# Patient Record
Sex: FEMALE | Race: WHITE | NOT HISPANIC OR LATINO | Employment: OTHER | URBAN - METROPOLITAN AREA
[De-identification: names, ages, dates, MRNs, and addresses within clinical notes are randomized per-mention and may not be internally consistent; named-entity substitution may affect disease eponyms.]

---

## 2017-11-20 ENCOUNTER — GENERIC CONVERSION - ENCOUNTER (OUTPATIENT)
Dept: OTHER | Facility: OTHER | Age: 72
End: 2017-11-20

## 2017-11-20 DIAGNOSIS — Z12.31 ENCOUNTER FOR SCREENING MAMMOGRAM FOR MALIGNANT NEOPLASM OF BREAST: ICD-10-CM

## 2017-11-28 ENCOUNTER — GENERIC CONVERSION - ENCOUNTER (OUTPATIENT)
Dept: OTHER | Facility: OTHER | Age: 72
End: 2017-11-28

## 2018-01-22 VITALS
BODY MASS INDEX: 30.4 KG/M2 | SYSTOLIC BLOOD PRESSURE: 140 MMHG | HEIGHT: 61 IN | DIASTOLIC BLOOD PRESSURE: 82 MMHG | WEIGHT: 161 LBS

## 2018-03-27 RX ORDER — B-COMPLEX WITH VITAMIN C
1 TABLET ORAL 2 TIMES DAILY
COMMUNITY

## 2018-03-27 RX ORDER — BIOTIN 1 MG
TABLET ORAL DAILY
COMMUNITY

## 2018-03-27 NOTE — PROGRESS NOTES
[facility]UNJ[facility]  FAMILY PRACTICE OFFICE VISIT       NAME: Anup Washington  AGE: 68 y o  SEX: female       : 1945        MRN: [id number]    DATE: 3/29/2018  TIME: 2:59 PM    Assessment and Plan     Problem List Items Addressed This Visit     Osteoporosis     Currently on Prolia injections  Has previously had Reclast and Fosamax as well  She will continue to follow with the endocrinologist as directed by them  Other Visit Diagnoses     Medicare annual wellness visit, initial    -  Primary    Need for hepatitis C screening test        Encounter for hepatitis C screening test for low risk patient        Relevant Orders    Hepatitis C antibody    Need for lipid screening        Relevant Orders    Lipid Panel with Direct LDL reflex    Screening for diabetes mellitus        Relevant Orders    Comprehensive metabolic panel    Screening for cervical cancer        Relevant Orders    Ambulatory referral to Gynecology          Osteoporosis  Currently on Prolia injections  Has previously had Reclast and Fosamax as well  She will continue to follow with the endocrinologist as directed by them  Chief Complaint     Chief Complaint   Patient presents with    Establish Care       History of Present Illness   Anup Washington is a 68y o -year-old female who presents for establishing care  The patient presents today for establishing as a new patient  She has been on Prolia for a few years for her osteoporosis  She transitioned to Prolia after doing 3 years of Reclast and then some Fosamax  She will be seeing endocrinoogist in a few weeks for yearly follow-up  She just had labs done for them and will be getting some a few weeks later also  Review of Systems   Review of Systems   Constitutional: Negative for chills and fever  Respiratory: Negative for cough, shortness of breath and wheezing  Cardiovascular: Negative for chest pain and palpitations     Gastrointestinal: Negative for blood in stool, constipation, diarrhea, nausea and vomiting  Musculoskeletal: Negative for arthralgias and myalgias  Skin: Negative for rash  Neurological: Negative for dizziness and headaches  Psychiatric/Behavioral: Negative for dysphoric mood  The patient is not nervous/anxious  Active Problem List     Patient Active Problem List   Diagnosis    Dietary calcium deficiency    Osteoporosis         Past Medical History:  Past Medical History:   Diagnosis Date    Chicken pox     Kidney stones     Malignant neoplasm of skin     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       Past Surgical History:  Past Surgical History:   Procedure Laterality Date    COLONOSCOPY  2015    COMPLETE; DUE 3 YRS    DILATION AND CURETTAGE OF UTERUS  08/2013    THICKENED ENDO ON USG, CVX STENOSIS, PATH: BENIGN POLYP FRAGMENTS AND INACTIVE ENDOMETRIUM  NO NEOPLASIA ; IMPRESSION: 10/27/14; Kasie London    HYSTEROSCOPY      TONSILLECTOMY         Family History:  Family History   Problem Relation Age of Onset    Kidney cancer Mother 79     RENAL CANCER    Hypertension Mother    Vangie Moreno Skin cancer Mother        Social History:  Social History     Social History    Marital status: Single     Spouse name: N/A    Number of children: N/A    Years of education: N/A     Occupational History    MEDICAL RECORDS      Social History Main Topics    Smoking status: Former Smoker     Packs/day: 0 50    Smokeless tobacco: Never Used      Comment: STARTED AT AGE 17   STOPPED AT AGE 26      Alcohol use No    Drug use: No    Sexual activity: Not on file     Other Topics Concern    Not on file     Social History Narrative    EXERCISES MODERATELY LESS THAN 3 TIMES WEEK           Objective     Vitals:    03/29/18 1403   BP: 142/82   Pulse: 73   SpO2: 98%     Wt Readings from Last 3 Encounters:   03/29/18 70 4 kg (155 lb 4 oz)   11/20/17 73 kg (161 lb)   11/07/16 72 1 kg (159 lb)       Physical Exam   Constitutional: She appears well-developed and well-nourished  HENT:   Head: Normocephalic and atraumatic  Right Ear: Hearing, tympanic membrane, external ear and ear canal normal    Left Ear: Hearing, tympanic membrane, external ear and ear canal normal    Nose: Nose normal    Mouth/Throat: Oropharynx is clear and moist and mucous membranes are normal    Eyes: Conjunctivae and lids are normal  Pupils are equal, round, and reactive to light  Neck: Trachea normal and normal range of motion  Neck supple  Carotid bruit is not present  No thyroid mass and no thyromegaly present  Cardiovascular: Normal rate, regular rhythm, S1 normal, S2 normal, normal heart sounds and intact distal pulses  No murmur heard  Pulses:       Radial pulses are 2+ on the right side, and 2+ on the left side  Posterior tibial pulses are 2+ on the right side, and 2+ on the left side  Pulmonary/Chest: Effort normal and breath sounds normal  She has no decreased breath sounds  She has no wheezes  She has no rhonchi  She has no rales  Abdominal: Soft  Normal appearance and bowel sounds are normal  She exhibits no distension and no mass  There is no hepatosplenomegaly  There is no tenderness  No hernia  Musculoskeletal: Normal range of motion  Lymphadenopathy:     She has no cervical adenopathy  Neurological: She is alert  She has normal strength  No sensory deficit (light touch sensation intact and equal in UE and LE bilaterally)  Skin: Skin is warm and dry  No rash noted  Psychiatric: She has a normal mood and affect   Her behavior is normal        Pertinent Laboratory/Diagnostic Studies:  Orders Placed This Encounter   Procedures    Comprehensive metabolic panel    Lipid Panel with Direct LDL reflex    Hepatitis C antibody    Ambulatory referral to Gynecology       ALLERGIES:  No Known Allergies    Current Medications     Current Outpatient Prescriptions   Medication Sig Dispense Refill    aspirin 81 MG tablet Take 81 mg by mouth daily  Calcium Carbonate-Vitamin D (CALCIUM 600+D) 600-200 MG-UNIT TABS Take by mouth daily        Cholecalciferol (VITAMIN D3) 1000 units CAPS Take by mouth daily        denosumab (PROLIA) 60 mg/mL Inject under the skin every 6 (six) months        Multiple Vitamins-Minerals (DAILY MULTI PO) Take by mouth daily         No current facility-administered medications for this visit            Health Maintenance     Health Maintenance   Topic Date Due    Hepatitis C Screening  1945    Depression Screening PHQ-9  01/30/1958    DTaP,Tdap,and Td Vaccines (1 - Tdap) 01/30/1967    Fall Risk  01/30/2011    Urinary Incontinence Screening  01/30/2011    PNEUMOCOCCAL POLYSACCHARIDE VACCINE AGE 65 AND OVER  01/30/2011    GLAUCOMA SCREENING 67+ YR  01/30/2013    INFLUENZA VACCINE  09/01/2017    MAMMOGRAM  11/28/2018    COLONOSCOPY  05/19/2020     Immunization History   Administered Date(s) Administered    Influenza 10/24/2007, 10/23/2017    Pneumococcal Conjugate 13-Valent 10/23/2017    Zoster 06/17/2016       Heath Hastings PA-C  3/29/2018 2:59 PM  [facility]

## 2018-03-29 ENCOUNTER — OFFICE VISIT (OUTPATIENT)
Dept: FAMILY MEDICINE CLINIC | Facility: CLINIC | Age: 73
End: 2018-03-29
Payer: MEDICARE

## 2018-03-29 VITALS
OXYGEN SATURATION: 98 % | SYSTOLIC BLOOD PRESSURE: 142 MMHG | HEIGHT: 61 IN | BODY MASS INDEX: 29.31 KG/M2 | DIASTOLIC BLOOD PRESSURE: 82 MMHG | HEART RATE: 73 BPM | WEIGHT: 155.25 LBS

## 2018-03-29 DIAGNOSIS — Z11.59 NEED FOR HEPATITIS C SCREENING TEST: ICD-10-CM

## 2018-03-29 DIAGNOSIS — Z13.220 NEED FOR LIPID SCREENING: ICD-10-CM

## 2018-03-29 DIAGNOSIS — Z13.1 SCREENING FOR DIABETES MELLITUS: ICD-10-CM

## 2018-03-29 DIAGNOSIS — Z00.00 MEDICARE ANNUAL WELLNESS VISIT, INITIAL: Primary | ICD-10-CM

## 2018-03-29 DIAGNOSIS — Z12.4 SCREENING FOR CERVICAL CANCER: ICD-10-CM

## 2018-03-29 DIAGNOSIS — Z11.59 ENCOUNTER FOR HEPATITIS C SCREENING TEST FOR LOW RISK PATIENT: ICD-10-CM

## 2018-03-29 DIAGNOSIS — M81.0 OSTEOPOROSIS, UNSPECIFIED OSTEOPOROSIS TYPE, UNSPECIFIED PATHOLOGICAL FRACTURE PRESENCE: ICD-10-CM

## 2018-03-29 PROCEDURE — G0438 PPPS, INITIAL VISIT: HCPCS | Performed by: PHYSICIAN ASSISTANT

## 2018-03-29 NOTE — PATIENT INSTRUCTIONS
Obesity   AMBULATORY CARE:   Obesity  is when your body mass index (BMI) is greater than 30  Your healthcare provider will use your height and weight to measure your BMI  The risks of obesity include  many health problems, such as injuries or physical disability  You may need tests to check for the following:  · Diabetes     · High blood pressure or high cholesterol     · Heart disease     · Gallbladder or liver disease     · Cancer of the colon, breast, prostate, liver, or kidney     · Sleep apnea     · Arthritis or gout  Seek care immediately if:   · You have a severe headache, confusion, or difficulty speaking  · You have weakness on one side of your body  · You have chest pain, sweating, or shortness of breath  Contact your healthcare provider if:   · You have symptoms of gallbladder or liver disease, such as pain in your upper abdomen  · You have knee or hip pain and discomfort while walking  · You have symptoms of diabetes, such as intense hunger and thirst, and frequent urination  · You have symptoms of sleep apnea, such as snoring or daytime sleepiness  · You have questions or concerns about your condition or care  Treatment for obesity  focuses on helping you lose weight to improve your health  Even a small decrease in BMI can reduce the risk for many health problems  Your healthcare provider will help you set a weight-loss goal   · Lifestyle changes  are the first step in treating obesity  These include making healthy food choices and getting regular physical activity  Your healthcare provider may suggest a weight-loss program that involves coaching, education, and therapy  · Medicine  may help you lose weight when it is used with a healthy diet and physical activity  · Surgery  can help you lose weight if you are very obese and have other health problems  There are several types of weight-loss surgery  Ask your healthcare provider for more information    Be successful losing weight:   · Set small, realistic goals  An example of a small goal is to walk for 20 minutes 5 days a week  Anther goal is to lose 5% of your body weight  · Tell friends, family members, and coworkers about your goals  and ask for their support  Ask a friend to lose weight with you, or join a weight-loss support group  · Identify foods or triggers that may cause you to overeat , and find ways to avoid them  Remove tempting high-calorie foods from your home and workplace  Place a bowl of fresh fruit on your kitchen counter  If stress causes you to eat, then find other ways to cope with stress  · Keep a diary to track what you eat and drink  Also write down how many minutes of physical activity you do each day  Weigh yourself once a week and record it in your diary  Eating changes: You will need to eat 500 to 1,000 fewer calories each day than you currently eat to lose 1 to 2 pounds a week  The following changes will help you cut calories:  · Eat smaller portions  Use small plates, no larger than 9 inches in diameter  Fill your plate half full of fruits and vegetables  Measure your food using measuring cups until you know what a serving size looks like  · Eat 3 meals and 1 or 2 snacks each day  Plan your meals in advance  Дмитрий Bender and eat at home most of the time  Eat slowly  · Eat fruits and vegetables at every meal   They are low in calories and high in fiber, which makes you feel full  Do not add butter, margarine, or cream sauce to vegetables  Use herbs to season steamed vegetables  · Eat less fat and fewer fried foods  Eat more baked or grilled chicken and fish  These protein sources are lower in calories and fat than red meat  Limit fast food  Dress your salads with olive oil and vinegar instead of bottled dressing  · Limit the amount of sugar you eat  Do not drink sugary beverages  Limit alcohol  Activity changes:  Physical activity is good for your body in many ways   It helps you burn calories and build strong muscles  It decreases stress and depression, and improves your mood  It can also help you sleep better  Talk to your healthcare provider before you begin an exercise program   · Exercise for at least 30 minutes 5 days a week  Start slowly  Set aside time each day for physical activity that you enjoy and that is convenient for you  It is best to do both weight training and an activity that increases your heart rate, such as walking, bicycling, or swimming  · Find ways to be more active  Do yard work and housecleaning  Walk up the stairs instead of using elevators  Spend your leisure time going to events that require walking, such as outdoor festivals or fairs  This extra physical activity can help you lose weight and keep it off  Follow up with your healthcare provider as directed: You may need to meet with a dietitian  Write down your questions so you remember to ask them during your visits  © 2017 [facility] Information is for End User's use only and may not be sold, redistributed or otherwise used for commercial purposes  All illustrations and images included in CareNotes® are the copyrighted property of [employer]  or Leodan Pittman  The above information is an  only  It is not intended as medical advice for individual conditions or treatments  Talk to your doctor, nurse or pharmacist before following any medical regimen to see if it is safe and effective for you  Urinary Incontinence   WHAT YOU NEED TO KNOW:   What is urinary incontinence? Urinary incontinence (UI) is when you lose control of your bladder  What causes UI? UI occurs because your bladder cannot store or empty urine properly  The following are the most common types of UI:  · Stress incontinence  is when you leak urine due to increased bladder pressure  This may happen when you cough, sneeze, or exercise       · Urge incontinence  is when you feel the need to urinate right away and leak urine accidentally  · Mixed incontinence  is when you have both stress and urge UI  What are the signs and symptoms of UI?   · You feel like your bladder does not empty completely when you urinate  · You urinate often and need to urinate immediately  · You leak urine when you sleep, or you wake up with the urge to urinate  · You leak urine when you cough, sneeze, exercise, or laugh  How is UI diagnosed? Your healthcare provider will ask how often you leak urine and whether you have stress or urge symptoms  Tell him which medicines you take, how often you urinate, and how much liquid you drink each day  You may need any of the following tests:  · Urine tests  may show infection or kidney function  · A pelvic exam  may be done to check for blockages  A pelvic exam will also show if your bladder, uterus, or other organs have moved out of place  · An x-ray, ultrasound, or CT  may show problems with parts of your urinary system  You may be given contrast liquid to help your organs show up better in the pictures  Tell the healthcare provider if you have ever had an allergic reaction to contrast liquid  Do not enter the MRI room with anything metal  Metal can cause serious injury  Tell the healthcare provider if you have any metal in or on your body  · A bladder scan  will show how much urine is left in your bladder after you urinate  You will be asked to urinate and then healthcare providers will use a small ultrasound machine to check the urine left in your bladder  · Cystometry  is used to check the function of your urinary system  Your healthcare provider checks the pressure in your bladder while filling it with fluid  Your bladder pressure may also be tested when your bladder is full and while you urinate  How is UI treated? · Medicines  can help strengthen your bladder control      · Electrical stimulation  is used to send a small amount of electrical energy to your pelvic floor muscles  This helps control your bladder function  Electrodes may be placed outside your body or in your rectum  For women, the electrodes may be placed in the vagina  · A bulking agent  may be injected into the wall of your urethra to make it thicker  This helps keep your urethra closed and decreases urine leakage  · Devices  such as a clamp, pessary, or tampon may help stop urine leaks  Ask your healthcare provider for more information about these and other devices  · Surgery  may be needed if other treatments do not work  Several types of surgery can help improve your bladder control  Ask your healthcare provider for more information about the surgery you may need  How can I manage my symptoms? · Do pelvic muscle exercises often  Your pelvic muscles help you stop urinating  Squeeze these muscles tight for 5 seconds, then relax for 5 seconds  Gradually work up to squeezing for 10 seconds  Do 3 sets of 15 repetitions a day, or as directed  This will help strengthen your pelvic muscles and improve bladder control  · A catheter  may be used to help empty your bladder  A catheter is a tiny, plastic tube that is put into your bladder to drain your urine  Your healthcare provider may tell you to use a catheter to prevent your bladder from getting too full and leaking urine  · Keep a UI record  Write down how often you leak urine and how much you leak  Make a note of what you were doing when you leaked urine  · Train your bladder  Go to the bathroom at set times, such as every 2 hours, even if you do not feel the urge to go  You can also try to hold your urine when you feel the urge to go  For example, hold your urine for 5 minutes when you feel the urge to go  As that becomes easier, hold your urine for 10 minutes  · Drink liquids as directed  Ask your healthcare provider how much liquid to drink each day and which liquids are best for you   You may need to limit the amount of liquid you drink to help control your urine leakage  Limit or do not have drinks that contain caffeine or alcohol  Do not drink any liquid right before you go to bed  · Prevent constipation  Eat a variety of high-fiber foods  Good examples are high-fiber cereals, beans, vegetables, and whole-grain breads  Prune juice may help make your bowel movement softer  Walking is the best way to trigger your intestines to have a bowel movement  · Exercise regularly and maintain a healthy weight  Ask your healthcare provider how much you should weigh and about the best exercise plan for you  Weight loss and exercise will decrease pressure on your bladder and help you control your leakage  Ask him to help you create a weight loss plan if you are overweight  When should I seek immediate care? · You have severe pain  · You are confused or cannot think clearly  When should I contact my healthcare provider? · You have a fever  · You see blood in your urine  · You have pain when you urinate  · You have new or worse pain, even after treatment  · Your mouth feels dry or you have vision changes  · Your urine is cloudy or smells bad  · You have questions or concerns about your condition or care  CARE AGREEMENT:   You have the right to help plan your care  Learn about your health condition and how it may be treated  Discuss treatment options with your caregivers to decide what care you want to receive  You always have the right to refuse treatment  The above information is an  only  It is not intended as medical advice for individual conditions or treatments  Talk to your doctor, nurse or pharmacist before following any medical regimen to see if it is safe and effective for you  © 2017 [address] Information is for End User's use only and may not be sold, redistributed or otherwise used for commercial purposes   All illustrations and images included in CareNotes® are the copyrighted property of [employer]  or Leodan Pittman  Cigarette Smoking and Your Health   AMBULATORY CARE:   Risks to your health if you smoke:  Nicotine and other chemicals found in tobacco damage every cell in your body  Even if you are a light smoker, you have an increased risk for cancer, heart disease, and lung disease  If you are pregnant or have diabetes, smoking increases your risk for complications  Benefits to your health if you stop smoking:   · You decrease respiratory symptoms such as coughing, wheezing, and shortness of breath  · You reduce your risk for cancers of the lung, mouth, throat, kidney, bladder, pancreas, stomach, and cervix  If you already have cancer, you increase the benefits of chemotherapy  You also reduce your risk for cancer returning or a second cancer from developing  · You reduce your risk for heart disease, blood clots, heart attack, and stroke  · You reduce your risk for lung infections, and diseases such as pneumonia, asthma, chronic bronchitis, and emphysema  · Your circulation improves  More oxygen can be delivered to your body  If you have diabetes, you lower your risk for complications, such as kidney, artery, and eye diseases  You also lower your risk for nerve damage  Nerve damage can lead to amputations, poor vision, and blindness  · You improve your body's ability to heal and to fight infections  Benefits to the health of others if you stop smoking:  Tobacco is harmful to nonsmokers who breathe in your secondhand smoke  The following are ways the health of others around you may improve when you stop smoking:  · You lower the risks for lung cancer and heart disease in nonsmoking adults  · If you are pregnant, you lower the risk for miscarriage, early delivery, low birth weight, and stillbirth  You also lower your baby's risk for SIDS, obesity, developmental delay, and neurobehavioral problems, such as ADHD  · If you have children, you lower their risk for ear infections, colds, pneumonia, bronchitis, and asthma  For more information and support to stop smoking:   · Smokefree  gov  Phone: 3- 657 - 902-0820  Web Address: www smokefree  gov  Follow up with your healthcare provider as directed:  Write down your questions so you remember to ask them during your visits  © 2017 [address] [employer] Information is for End User's use only and may not be sold, redistributed or otherwise used for commercial purposes  All illustrations and images included in CareNotes® are the copyrighted property of [employer]  or Leodan Pittman  The above information is an  only  It is not intended as medical advice for individual conditions or treatments  Talk to your doctor, nurse or pharmacist before following any medical regimen to see if it is safe and effective for you  Fall Prevention   AMBULATORY CARE:   Fall prevention  includes ways to make your home and other areas safer  It also includes ways you can move more carefully to prevent a fall  Health conditions that cause changes in your blood pressure, vision, or muscle strength and coordination may increase your risk for falls  Medicines may also increase your risk for falls if they make you dizzy, weak, or sleepy  Call 911 or have someone else call if:   · You have fallen and are unconscious  · You have fallen and cannot move part of your body  Contact your healthcare provider if:   · You have fallen and have pain or a headache  · You have questions or concerns about your condition or care  Fall prevention tips:   · Stand or sit up slowly  This may help you keep your balance and prevent falls  · Use assistive devices as directed  Your healthcare provider may suggest that you use a cane or walker to help you keep your balance  You may need to have grab bars put in your bathroom near the toilet or in the shower      · Wear shoes that fit well and have soles that   Wear shoes both inside and outside  Use slippers with good   Do not wear shoes with high heels  · Wear a personal alarm  This is a device that allows you to call 911 if you fall and need help  Ask your healthcare provider for more information  · Stay active  Exercise can help strengthen your muscles and improve your balance  Your healthcare provider may recommend water aerobics or walking  He or she may also recommend physical therapy to improve your coordination  Never start an exercise program without talking to your healthcare provider first      · Manage your medical conditions  Keep all appointments with your healthcare providers  Visit your eye doctor as directed  Home safety tips:   · Add items to prevent falls in the bathroom  Put nonslip strips on your bath or shower floor to prevent you from slipping  Use a bath mat if you do not have carpet in the bathroom  This will prevent you from falling when you step out of the bath or shower  Use a shower seat so you do not need to stand while you shower  Sit on the toilet or a chair in your bathroom to dry yourself and put on clothing  This will prevent you from losing your balance from drying or dressing yourself while you are standing  · Keep paths clear  Remove books, shoes, and other objects from walkways and stairs  Place cords for telephones and lamps out of the way so that you do not need to walk over them  Tape them down if you cannot move them  Remove small rugs  If you cannot remove a rug, secure it with double-sided tape  This will prevent you from tripping  · Install bright lights in your home  Use night lights to help light paths to the bathroom or kitchen  Always turn on the light before you start walking  · Keep items you use often on shelves within reach  Do not use a step stool to help you reach an item  · Paint or place reflective tape on the edges of your stairs    This will help you see the stairs better  Follow up with your healthcare provider as directed:  Write down your questions so you remember to ask them during your visits  © 2017 [address] [employer] Information is for End User's use only and may not be sold, redistributed or otherwise used for commercial purposes  All illustrations and images included in CareNotes® are the copyrighted property of [employer]  or Leodan Pittman  The above information is an  only  It is not intended as medical advice for individual conditions or treatments  Talk to your doctor, nurse or pharmacist before following any medical regimen to see if it is safe and effective for you  Advance Directives   WHAT YOU NEED TO KNOW:   What are advance directives? Advance directives are legal documents that state your wishes and plans for medical care  These plans are made ahead of time in case you lose your ability to make decisions for yourself  Advance directives can apply to any medical decision, such as the treatments you want, and if you want to donate organs  What are the types of advance directives? There are many types of advance directives, and each state has rules about how to use them  You may choose a combination of any of the following:  · Living will: This is a written record of the treatment you want  You can also choose which treatments you do not want, which to limit, and which to stop at a certain time  This includes surgery, medicine, IV fluid, and tube feedings  · Durable power of  for healthcare [facility]): This is a written record that states who you want to make healthcare choices for you when you are unable to make them for yourself  This person, called a proxy, is usually a family member or a friend  You may choose more than 1 proxy  · Do not resuscitate (DNR) order:  A DNR order is used in case your heart stops beating or you stop breathing   It is a request not to have certain forms of treatment, such as CPR  A DNR order may be included in other types of advance directives  · Medical directive: This covers the care that you want if you are in a coma, near death, or unable to make decisions for yourself  You can list the treatments you want for each condition  Treatment may include pain medicine, surgery, blood transfusions, dialysis, IV or tube feedings, and a ventilator (breathing machine)  · Values history: This document has questions about your views, beliefs, and how you feel and think about life  This information can help others choose the care that you would choose  Why are advance directives important? An advance directive helps you control your care  Although spoken wishes may be used, it is better to have your wishes written down  Spoken wishes can be misunderstood, or not followed  Treatments may be given even if you do not want them  An advance directive may make it easier for your family to make difficult choices about your care  How do I decide what to put in my advance directives? · Make informed decisions:  Make sure you fully understand treatments or care you may receive  Think about the benefits and problems your decisions could cause for you or your family  Talk to healthcare providers if you have concerns or questions before you write down your wishes  You may also want to talk with your [employer] or , or a   Check your state laws to make sure that what you put in your advance directive is legal      · Sign all forms:  Sign and date your advance directive when you have finished  You may also need 2 witnesses to sign the forms  Witnesses cannot be your doctor or his staff, your spouse, heirs or beneficiaries, people you owe money to, or your chosen proxy  Talk to your family, proxy, and healthcare providers about your advance directive  Give each person a copy, and keep one for yourself in a place you can get to easily   Do not keep it hidden or locked away  · Review and revise your plans: You can revise your advance directive at any time, as long as you are able to make decisions  Review your plan every year, and when there are changes in your life, or your health  When you make changes, let your family, proxy, and healthcare providers know  Give each a new copy  Where can I find more information? · [employer]  [address] , [place] [address]  Phone: 5- 072 - 219-0610  Phone: 2- 843 - 100-0861  Web Address: http://www  aafp org  · [address] [facility])  [address] [address] [facility], [address] [facility] [place] , [address] [place] [address]  Phone: 4- [phone] - [phone]  Phone: [phone]  Web Address: [address]  CARE AGREEMENT:   You have the right to help plan your care  To help with this plan, you must learn about your health condition and treatment options  You must also learn about advance directives and how they are used  Work with your healthcare providers to decide what care will be used to treat you  You always have the right to refuse treatment  The above information is an  only  It is not intended as medical advice for individual conditions or treatments  Talk to your doctor, nurse or pharmacist before following any medical regimen to see if it is safe and effective for you  © 2017 [address] Information is for End User's use only and may not be sold, redistributed or otherwise used for commercial purposes  All illustrations and images included in CareNotes® are the copyrighted property of [employer] M [employer]  or Leodan Pittman

## 2018-03-29 NOTE — ASSESSMENT & PLAN NOTE
Currently on Prolia injections  Has previously had Reclast and Fosamax as well  She will continue to follow with the endocrinologist as directed by them

## 2018-03-29 NOTE — PROGRESS NOTES
HPI:  Gregory Quiñones is a 68 y o  female here for her Initial Wellness Visit  Patient Active Problem List   Diagnosis    Dietary calcium deficiency    Osteoporosis     Past Medical History:   Diagnosis Date    Chicken pox     Kidney stones     Malignant neoplasm of skin     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     Past Surgical History:   Procedure Laterality Date    COLONOSCOPY  2015    COMPLETE; DUE 3 YRS    DILATION AND CURETTAGE OF UTERUS  08/2013    THICKENED ENDO ON USG, CVX STENOSIS, PATH: BENIGN POLYP FRAGMENTS AND INACTIVE ENDOMETRIUM  NO NEOPLASIA ; IMPRESSION: 10/27/14; RISA MORRIS    HYSTEROSCOPY      TONSILLECTOMY       Family History   Problem Relation Age of Onset    Kidney cancer Mother 79     RENAL CANCER    Hypertension Mother     Skin cancer Mother      History   Smoking Status    Former Smoker    Packs/day: 0 50   Smokeless Tobacco    Never Used     Comment: STARTED AT AGE 17  STOPPED AT AGE 23       History   Alcohol Use No      History   Drug Use No     /82   Pulse 73   Ht 5' 0 6" (1 539 m)   Wt 70 4 kg (155 lb 4 oz)   SpO2 98%   BMI 29 72 kg/m²       Current Outpatient Prescriptions   Medication Sig Dispense Refill    aspirin 81 MG tablet Take 81 mg by mouth daily        Calcium Carbonate-Vitamin D (CALCIUM 600+D) 600-200 MG-UNIT TABS Take by mouth daily        Cholecalciferol (VITAMIN D3) 1000 units CAPS Take by mouth daily        denosumab (PROLIA) 60 mg/mL Inject under the skin every 6 (six) months        Multiple Vitamins-Minerals (DAILY MULTI PO) Take by mouth daily         No current facility-administered medications for this visit        No Known Allergies  Immunization History   Administered Date(s) Administered    Influenza 10/24/2007, 10/23/2017    Pneumococcal Conjugate 13-Valent 10/23/2017    Zoster 06/17/2016       Patient Care Team:  Jose Eduardo Rubio MD as PCP - General (Family Medicine)  Krystyna Berg MD  Doc Gess, MD      Medicare Screening Tests and Risk Assessments:  JOHN Clinical     ISAR:       Once in a Lifetime Medicare Screening:       Medicare Screening Tests and Risk Assessment:   AAA Risk Assessment    Osteoporosis Risk Assessment    HIV Risk Assessment        Drug and Alcohol Use:   Tobacco use    Tobacco use duration    Tobacco Cessation Readiness    Alcohol use    Alcohol Treatment Readiness   Illicit Drug Use        Diet & Exercise:   Diet   How many servings a day of the following:   Exercise        Cognitive Impairment Screening:   Cognitive Impairment Screening        Functional Ability/Level of Safety:   Hearing    Hearing Impairment Assessment    Current Activities    Help needed with the folllowing:    ADL    Fall Risk   Injury History       Home Safety:   Home Safety Risk Factors       Advanced Directives:   Advanced Directives    Patient's End of Life Decisions        Urinary Incontinence:       Glaucoma:            Provider Screening     Preventative Screening/Counseling:   Cardiovascular Screening/Counseling:   (Labs Q5 years, EKG optional one-time)     Due for: Lab Panel/Analytes:  Lipid Panel         Diabetes Screening/Counseling:   (2 tests/year if Pre-Diabetes or 1 test/year if no Diabetes)     Due for: Labs:  Blood Glucose         Colorectal Cancer Screening/Counseling:   (FOBT Q1 yr; Flex Sig Q4 yrs or Q10 yrs after Screening Colonoscopy; Screening Colonoscpy Q2 yrs High Risk or Q10 yrs Low Risk; Barium Enema Q2 yrs High Risk or Q4 yrs Low Risk)   General:  Screening Current           Prostate Cancer Screening/Counseling:   (Annual)          Breast Cancer Screening/Counseling:   (Baseline Age 28 - 43; Annual Age 36+)   General:  Screening Current          Cervical Cancer Screening/Counseling:   (Annual for High Risk or Childbearing Age with Abnormal Pap in Last 3 yrs;  Every 2 all others)   General:  Screening Current           Osteoporosis Screening/Counseling:   (Every 2 Yrs if at risk or more if medically necessary)   General:  Screening Current           AAA Screening/Counseling:   (Once per Lifetime with risk factors)          Glaucoma Screening/Counseling:   (Annual)   General:  Screening Current          HIV Screening/Counseling:   (Voluntary; Once annually for high risk OR 3 times for Pregnancy at diagnosis of IUP; 3rd trimester; and at Labor         Hepatitis C Screening:   Hepatitis C Counseling Provided:  Yes Hepatitis C Screening Accepted: Yes              Immunizations:   Influenza (annual): Influenza UTD This Year   Pneumococcal (Once in a Lifetime):  Risks & Benefits Discussed   Zostavax (Medicare D Coverage, Pt >72 yo):  Zostavax Vaccine UTD   Tdap (Non-Medicare Wellness Visit required): Tdap Vaccine Needed Today       Other Preventative Couseling (Non-Medicare Wellness Visit Required):       Referrals (Non-Medicare Wellness Visit Required):       Medical Equipment/Suppliers:           No exam data present  Reviewed Updated [facility] Prior Wellness Visits:   Last Medicare wellness visit information was reviewed, patient interviewed , no change since last AWVno  Last Medicare wellness visit information was reviewed, patient interviewed and updates made to the record today no    Assessment and Plan:  1  Medicare annual wellness visit, initial     2  Need for hepatitis C screening test     3  Encounter for hepatitis C screening test for low risk patient  Hepatitis C antibody   4  Need for lipid screening  Lipid Panel with Direct LDL reflex   5  Screening for diabetes mellitus  Comprehensive metabolic panel   6  Screening for cervical cancer  Ambulatory referral to Gynecology   7   Osteoporosis, unspecified osteoporosis type, unspecified pathological fracture presence         Health Maintenance Due   Topic Date Due    Hepatitis C Screening  1945    Depression Screening PHQ-9  01/30/1958    DTaP,Tdap,and Td Vaccines (1 - Tdap) 01/30/1967    Fall Risk  01/30/2011    Urinary Incontinence Screening  01/30/2011    PNEUMOCOCCAL POLYSACCHARIDE VACCINE AGE 65 AND OVER  01/30/2011    GLAUCOMA SCREENING 67+ YR  01/30/2013    INFLUENZA VACCINE  09/01/2017

## 2018-04-16 ENCOUNTER — EVALUATION (OUTPATIENT)
Dept: PHYSICAL THERAPY | Facility: REHABILITATION | Age: 73
End: 2018-04-16
Payer: MEDICARE

## 2018-04-16 DIAGNOSIS — M54.5 LOW BACK PAIN, UNSPECIFIED BACK PAIN LATERALITY, UNSPECIFIED CHRONICITY, WITH SCIATICA PRESENCE UNSPECIFIED: Primary | ICD-10-CM

## 2018-04-16 PROCEDURE — 97140 MANUAL THERAPY 1/> REGIONS: CPT | Performed by: PHYSICAL THERAPIST

## 2018-04-16 PROCEDURE — 97161 PT EVAL LOW COMPLEX 20 MIN: CPT | Performed by: PHYSICAL THERAPIST

## 2018-04-16 PROCEDURE — G8991 OTHER PT/OT GOAL STATUS: HCPCS | Performed by: PHYSICAL THERAPIST

## 2018-04-16 PROCEDURE — G8990 OTHER PT/OT CURRENT STATUS: HCPCS | Performed by: PHYSICAL THERAPIST

## 2018-04-16 NOTE — LETTER
2018    Anne Marie Bazan, [address]  [address] [facility] [address]    Patient: Azeem Car   YOB: 1945   Date of Visit: 2018     Encounter Diagnosis     ICD-10-CM    1  Low back pain, unspecified back pain laterality, unspecified chronicity, with sciatica presence unspecified M54 5        Dear Dr Tiffanie Sharma:    Please review the attached Plan of Care from [facility] recent visit  Please verify that you agree therapy should continue by signing the attached document and sending it back to our office  If you have any questions or concerns, please don't hesitate to call  Sincerely,    Carlos Manuel Maldonado PT      Referring Provider:      I certify that I have read the below Plan of Care and certify the need for these services furnished under this plan of treatment while under my care  Anne Marie Bazan MD  [address] [facility] [address] [facility]  [address]  [address] [facility] [address]: [phone]          PT Evaluation     Today's date: 2018  Patient name: Azeem Car  : 1945  MRN: [id number]  Referring provider: Del Rivero MD  Dx:   Encounter Diagnosis     ICD-10-CM    1  Low back pain, unspecified back pain laterality, unspecified chronicity, with sciatica presence unspecified M54 5                   Assessment  Impairments: abnormal or restricted ROM and pain with function    Assessment details: Azeem Car is a 68 y o  female referred to outpt PT with dx of LBP  Pt presents with decrease in trunk ROM, decrease in LE flexibility, decrease in right hip Ir, and positive TTP  Pt funct is limited with decrease in tolerance to ambulation for exercise, pain with transfers with sit to stand, car, and tub, and positive sleep disturbances  Pt would benefit from skilled PT to address these limitations and max funct  Prognosis: good    Goals  Funct 1  No sleep disturbances due to pain x4 weeks  2  No c/o pain with transfers x4 weeks    3  Amb up to 3 miles per day for recreation x4 weeks  Impairment 1  ncrease ROM by 25% x4 weeks  2  Decrease pain by 25 % x4 weeks      Plan  Patient would benefit from: skilled PT  Planned therapy interventions: neuromuscular re-education, manual therapy, joint mobilization, stretching, therapeutic exercise and home exercise program  Frequency: 2x week  Duration in weeks: 4        Subjective Evaluation    History of Present Illness  Mechanism of injury: Pt is very active woman in which she notes beginning with severe LBP over the last week  Pt notes that she had appt with rheumatologist last week for Prolia shot in which she notes over the last few days, sx's have diminished significantly  Pt notes that she used to walk up to 3 miles per day, but was restricted last week due to severe pain  Pt has returned to amb since sx's have decreased in which she is able to perform 1 mile Friday and 2 on Saturday without any increase in LBP  Pt notes sx's are primarily in central LB and into right and c/o sx's initially when turning onto right side when sleeping, but able to rest in SL once situated  Pt is able to perform negotiation of stairs reciprocally, but cont to c/o pain  Pt notes having diff remembering what caused sx's initially last week, but notes she may have "twisted wrong" getting out of the tub  Pt c/o pain with car and tub transfers      Pain  Current pain rating: 3  At best pain rating: 3  At worst pain ratin  Quality: dull ache  Relieving factors: heat, ice and medications    Patient Goals  Patient goals for therapy: decreased pain          Objective     Neurological Testing     Sensation     Lumbar   Left   Intact: light touch    Right   Intact: light touch    Active Range of Motion     Lumbar   Flexion: 45 degrees   Extension: 10 degrees   Left lateral flexion: 20 degrees   Right lateral flexion: 20 degrees with pain  Left rotation: 45 degrees with pain  Right rotation: 45 degrees     Additional Active Range of Motion Details  Pt is mod TTP present over L4-5 TP on right  Pt has increase in pain mildly into LB with flexion and extension  Pt has increase in pain with right SB and c/o right lateral thigh "achyness" with right SB repeated  Strength/Myotome Testing     Lumbar   Left   Normal strength    Right   Normal strength    Tests       Thoracic   Negative slump  Lumbar   Negative repeated flexion, repeated extension and slump  Left   Negative crossed SLR, femoral stretch and passive SLR  Right   Negative crossed SLR, femoral stretch and passive SLR  Additional Tests Details  Pt has negative SLR  Bilat, however does demo tightness at 45 degrees, initial decrease in IR on right past neutral due to increase in LBP, and negative scour, WERNER, FADDIR          Flowsheet Rows    Flowsheet Row Most Recent Value   PT/OT G-Codes   Current Score  56   Projected Score  75   FOTO information reviewed  Yes   Assessment Type  Evaluation   G code set  Other PT/OT Primary   Other PT Primary Current Status ()  CK   Other PT Primary Goal Status ()  CJ        Precautions: Osteoporosis    Specialty Daily Treatment Diary     Manual  4/16/18       LAD/SAD R 5 mins       Grade III/IV lumbar rotation left SL 5 mins                                   Exercise Diary         treadmill        pball 3 way        Hamstring strap st        Piriformis st        MELLO at wall        Side glide to R                                                                                                                            Modalities        MHP sitting 10 mins

## 2018-04-16 NOTE — PROGRESS NOTES
PT Evaluation     Today's date: 2018  Patient name: Donny Schwarz  : 1945  MRN: [id number]  Referring provider: Heath Goins MD  Dx:   Encounter Diagnosis     ICD-10-CM    1  Low back pain, unspecified back pain laterality, unspecified chronicity, with sciatica presence unspecified M54 5                   Assessment  Impairments: abnormal or restricted ROM and pain with function    Assessment details: Donny Schwarz is a 68 y o  female referred to outpt PT with dx of LBP  Pt presents with decrease in trunk ROM, decrease in LE flexibility, decrease in right hip Ir, and positive TTP  Pt funct is limited with decrease in tolerance to ambulation for exercise, pain with transfers with sit to stand, car, and tub, and positive sleep disturbances  Pt would benefit from skilled PT to address these limitations and max funct  Prognosis: good    Goals  Funct 1  No sleep disturbances due to pain x4 weeks  2  No c/o pain with transfers x4 weeks  3  Amb up to 3 miles per day for recreation x4 weeks  Impairment 1  ncrease ROM by 25% x4 weeks  2  Decrease pain by 25 % x4 weeks      Plan  Patient would benefit from: skilled PT  Planned therapy interventions: neuromuscular re-education, manual therapy, joint mobilization, stretching, therapeutic exercise and home exercise program  Frequency: 2x week  Duration in weeks: 4        Subjective Evaluation    History of Present Illness  Mechanism of injury: Pt is very active woman in which she notes beginning with severe LBP over the last week  Pt notes that she had appt with rheumatologist last week for Prolia shot in which she notes over the last few days, sx's have diminished significantly  Pt notes that she used to walk up to 3 miles per day, but was restricted last week due to severe pain  Pt has returned to amb since sx's have decreased in which she is able to perform 1 mile Friday and 2 on Saturday without any increase in LBP    Pt notes sx's are primarily in central LB and into right and c/o sx's initially when turning onto right side when sleeping, but able to rest in SL once situated  Pt is able to perform negotiation of stairs reciprocally, but cont to c/o pain  Pt notes having diff remembering what caused sx's initially last week, but notes she may have "twisted wrong" getting out of the tub  Pt c/o pain with car and tub transfers  Pain  Current pain rating: 3  At best pain rating: 3  At worst pain ratin  Quality: dull ache  Relieving factors: heat, ice and medications    Patient Goals  Patient goals for therapy: decreased pain          Objective     Neurological Testing     Sensation     Lumbar   Left   Intact: light touch    Right   Intact: light touch    Active Range of Motion     Lumbar   Flexion: 45 degrees   Extension: 10 degrees   Left lateral flexion: 20 degrees   Right lateral flexion: 20 degrees with pain  Left rotation: 45 degrees with pain  Right rotation: 45 degrees     Additional Active Range of Motion Details  Pt is mod TTP present over L4-5 TP on right  Pt has increase in pain mildly into LB with flexion and extension  Pt has increase in pain with right SB and c/o right lateral thigh "achyness" with right SB repeated  Strength/Myotome Testing     Lumbar   Left   Normal strength    Right   Normal strength    Tests       Thoracic   Negative slump  Lumbar   Negative repeated flexion, repeated extension and slump  Left   Negative crossed SLR, femoral stretch and passive SLR  Right   Negative crossed SLR, femoral stretch and passive SLR  Additional Tests Details  Pt has negative SLR  Bilat, however does demo tightness at 45 degrees, initial decrease in IR on right past neutral due to increase in LBP, and negative scour, WERNER, FADDIR          Flowsheet Rows    Flowsheet Row Most Recent Value   PT/OT G-Codes   Current Score  56   Projected Score  75   FOTO information reviewed  Yes   Assessment Type  Evaluation   G code set  Other PT/OT Primary   Other PT Primary Current Status ()  CK   Other PT Primary Goal Status ()  CJ        Precautions: Osteoporosis    Specialty Daily Treatment Diary     Manual  4/16/18       LAD/SAD R 5 mins       Grade III/IV lumbar rotation left SL 5 mins                                   Exercise Diary         treadmill        pball 3 way        Hamstring strap st        Piriformis st        MELLO at wall        Side glide to R                                                                                                                            Modalities        MHP sitting 10 mins

## 2018-04-17 ENCOUNTER — TRANSCRIBE ORDERS (OUTPATIENT)
Dept: PHYSICAL THERAPY | Facility: REHABILITATION | Age: 73
End: 2018-04-17

## 2018-04-17 DIAGNOSIS — M54.5 LOW BACK PAIN, UNSPECIFIED BACK PAIN LATERALITY, UNSPECIFIED CHRONICITY, WITH SCIATICA PRESENCE UNSPECIFIED: Primary | ICD-10-CM

## 2018-04-19 ENCOUNTER — OFFICE VISIT (OUTPATIENT)
Dept: PHYSICAL THERAPY | Facility: REHABILITATION | Age: 73
End: 2018-04-19
Payer: MEDICARE

## 2018-04-19 DIAGNOSIS — M54.5 LOW BACK PAIN, UNSPECIFIED BACK PAIN LATERALITY, UNSPECIFIED CHRONICITY, WITH SCIATICA PRESENCE UNSPECIFIED: Primary | ICD-10-CM

## 2018-04-19 PROCEDURE — 97140 MANUAL THERAPY 1/> REGIONS: CPT

## 2018-04-19 PROCEDURE — 97110 THERAPEUTIC EXERCISES: CPT

## 2018-04-19 NOTE — PROGRESS NOTES
Daily Note     Today's date: 2018  Patient name: Zane Ordoñez  : 1945  MRN: [id number]  Referring provider: Rodger Loja MD  Dx:   Encounter Diagnosis     ICD-10-CM    1  Low back pain, unspecified back pain laterality, unspecified chronicity, with sciatica presence unspecified M54 5                   Subjective: Pt reports "I feel a little better today", notes R sided LBP rated as 3-4/10 arriving to PT this date  Objective: See treatment diary below    Precautions: Osteoporosis     Specialty Daily Treatment Diary      Manual  18         LAD/SAD R 5 mins  8 mins         Grade III/IV lumbar rotation left SL 5 mins  NP                                                         Exercise Diary              treadmill    1 5 mph 5'         pball 3 way    10"x5 ea         Hamstring strap st    15"x5 ea         Piriformis st    15"x5 ea         MELLO at wall    3"x10         Side glide to R    3"x10                                                                                                                                                                                                                   Modalities             MHP sitting 10 mins  10 mins                                          Assessment: Initiated TE as noted per PT POC and tolerated treatment well without increase in sx's  Pt has good relief with LAD/SAD, notes mild LB soreness leaving PT but denies pain  Patient would benefit from continued PT  Plan: Progress treatment as tolerated

## 2018-04-24 ENCOUNTER — OFFICE VISIT (OUTPATIENT)
Dept: PHYSICAL THERAPY | Facility: REHABILITATION | Age: 73
End: 2018-04-24
Payer: MEDICARE

## 2018-04-24 DIAGNOSIS — M54.5 LOW BACK PAIN, UNSPECIFIED BACK PAIN LATERALITY, UNSPECIFIED CHRONICITY, WITH SCIATICA PRESENCE UNSPECIFIED: Primary | ICD-10-CM

## 2018-04-24 PROCEDURE — 97110 THERAPEUTIC EXERCISES: CPT

## 2018-04-24 PROCEDURE — 97140 MANUAL THERAPY 1/> REGIONS: CPT

## 2018-04-24 NOTE — PROGRESS NOTES
Daily Note     Today's date: 2018  Patient name: Elizabeth Carlisle  : 1945  MRN: [id number]  Referring provider: Peter Liu MD  Dx:   Encounter Diagnosis     ICD-10-CM    1  Low back pain, unspecified back pain laterality, unspecified chronicity, with sciatica presence unspecified M54 5                   Subjective: Pt reports feeling "good" after last treatment, however notes having more pain on  rated at worst 8/10, which she is unsure why but notes remaining active  Pt notes being painfree yesterday and was able to complete 3 mile walk without onset of sx's for the first time in [facility]  Pt presents to PT this date with min 2/10 sx's  Objective: See treatment diary below    Precautions: Osteoporosis     Specialty Daily Treatment Diary      Manual  18       LAD/SAD R 5 mins  8 mins  5 mins       Grade III/IV lumbar rotation left SL 5 mins  NP  5 mins                                                       Exercise Diary              treadmill    1 5 mph 5'  1 5 mph 5'       pball 3 way    10"x5 ea  10"x5 ea       Hamstring strap st    15"x5 ea  15"x5 ea       Piriformis st    15"x5 ea  15"x5 ea       MELLO at wall    3"x10  3"x15       Side glide to R    3"x10  3"x10                                                                                                                                                                                                                 Modalities             MHP sitting 10 mins  10 mins  10 mins                                        Assessment:  Pt tolerated treatment well without increase in sx's  Has greater flexibility restrictions R > L  Pt has good response to mobs performed by Andrea Hoyt DPT, f/b heat and denies all sx's leaving Pt  Patient would benefit from continued PT  Plan: Progress treatment as tolerated

## 2018-04-25 LAB
ALBUMIN SERPL-MCNC: 3.3 G/DL (ref 3.6–5.1)
ALBUMIN/GLOB SERPL: 1 (CALC) (ref 1–2.5)
ALP SERPL-CCNC: 88 U/L (ref 33–130)
ALT SERPL-CCNC: 20 U/L (ref 6–29)
AST SERPL-CCNC: 40 U/L (ref 10–35)
BILIRUB SERPL-MCNC: 1.4 MG/DL (ref 0.2–1.2)
BUN SERPL-MCNC: 13 MG/DL (ref 7–25)
BUN/CREAT SERPL: ABNORMAL (CALC) (ref 6–22)
CALCIUM SERPL-MCNC: 9.3 MG/DL (ref 8.6–10.4)
CHLORIDE SERPL-SCNC: 106 MMOL/L (ref 98–110)
CHOLEST SERPL-MCNC: 175 MG/DL
CHOLEST/HDLC SERPL: 2.9 (CALC)
CO2 SERPL-SCNC: 26 MMOL/L (ref 20–31)
CREAT SERPL-MCNC: 0.8 MG/DL (ref 0.6–0.93)
GLOBULIN SER CALC-MCNC: 3.4 G/DL (CALC) (ref 1.9–3.7)
GLUCOSE SERPL-MCNC: 92 MG/DL (ref 65–99)
HCV AB S/CO SERPL IA: 0.01
HCV AB SERPL QL IA: NORMAL
HDLC SERPL-MCNC: 61 MG/DL
LDLC SERPL CALC-MCNC: 98 MG/DL (CALC)
NONHDLC SERPL-MCNC: 114 MG/DL (CALC)
POTASSIUM SERPL-SCNC: 3.7 MMOL/L (ref 3.5–5.3)
PROT SERPL-MCNC: 6.7 G/DL (ref 6.1–8.1)
SL AMB EGFR AFRICAN AMERICAN: 85 ML/MIN/1.73M2
SL AMB EGFR NON AFRICAN AMERICAN: 74 ML/MIN/1.73M2
SODIUM SERPL-SCNC: 141 MMOL/L (ref 135–146)
TRIGL SERPL-MCNC: 72 MG/DL

## 2018-04-26 ENCOUNTER — OFFICE VISIT (OUTPATIENT)
Dept: PHYSICAL THERAPY | Facility: REHABILITATION | Age: 73
End: 2018-04-26
Payer: MEDICARE

## 2018-04-26 DIAGNOSIS — M54.5 LOW BACK PAIN, UNSPECIFIED BACK PAIN LATERALITY, UNSPECIFIED CHRONICITY, WITH SCIATICA PRESENCE UNSPECIFIED: Primary | ICD-10-CM

## 2018-04-26 PROCEDURE — 97110 THERAPEUTIC EXERCISES: CPT | Performed by: PHYSICAL THERAPIST

## 2018-04-26 PROCEDURE — 97140 MANUAL THERAPY 1/> REGIONS: CPT | Performed by: PHYSICAL THERAPIST

## 2018-04-26 NOTE — PROGRESS NOTES
Daily Note     Today's date: 2018  Patient name: Berenice Tejeda  : 1945  MRN: [id number]  Referring provider: Luca Anthony MD  Dx:   Encounter Diagnosis     ICD-10-CM    1  Low back pain, unspecified back pain laterality, unspecified chronicity, with sciatica presence unspecified M54 5                   Subjective: Pt notes 3-4/10 pain, "much better compared to when I started "        Objective: See treatment diary below    Precautions: Osteoporosis     Specialty Daily Treatment Diary      Manual  18     LAD/SAD R 5 mins  8 mins  5 mins  5 mins     Grade III/IV lumbar rotation left SL 5 mins  NP  5 mins  5 mins                                                     Exercise Diary              treadmill    1 5 mph 5'  1 5 mph 5'  1 5 mph x6 mins     pball 3 way    10"x5 ea  10"x5 ea  10"x5 ea     Hamstring strap st    15"x5 ea  15"x5 ea  20"x5 ea     Piriformis st    15"x5 ea  15"x5 ea  20"x5 ea     MELLO at wall    3"x10  3"x15  3"x20     Side glide to R    3"x10  3"x10  3"x15                                                                                                                                                                                                               Modalities             MHP sitting 10 mins  10 mins  10 mins  10 mins                                      Assessment:  Pt notes during treatment that she amb 3 miles Tuesday without an increase in pain  Pt had spasms on 18 with insidious onset, but notes no spasms since  Good progression of stretching activities and educated to cont to progress ambulation as able  Plan: Progress as tolerated

## 2018-04-27 ENCOUNTER — TELEPHONE (OUTPATIENT)
Dept: FAMILY MEDICINE CLINIC | Facility: CLINIC | Age: 73
End: 2018-04-27

## 2018-04-27 DIAGNOSIS — R74.8 ELEVATED LIVER ENZYMES: Primary | ICD-10-CM

## 2018-04-30 ENCOUNTER — OFFICE VISIT (OUTPATIENT)
Dept: PHYSICAL THERAPY | Facility: REHABILITATION | Age: 73
End: 2018-04-30
Payer: MEDICARE

## 2018-04-30 DIAGNOSIS — M54.5 LOW BACK PAIN, UNSPECIFIED BACK PAIN LATERALITY, UNSPECIFIED CHRONICITY, WITH SCIATICA PRESENCE UNSPECIFIED: Primary | ICD-10-CM

## 2018-04-30 PROCEDURE — 97110 THERAPEUTIC EXERCISES: CPT | Performed by: PHYSICAL THERAPIST

## 2018-04-30 PROCEDURE — 97140 MANUAL THERAPY 1/> REGIONS: CPT | Performed by: PHYSICAL THERAPIST

## 2018-04-30 NOTE — PROGRESS NOTES
Daily Note     Today's date: 2018  Patient name: Kook Mueller  : 1945  MRN: [id number]  Referring provider: Sammy Lucero MD  Dx:   Encounter Diagnosis     ICD-10-CM    1  Low back pain, unspecified back pain laterality, unspecified chronicity, with sciatica presence unspecified M54 5                   Subjective: Pt notes that she is having less pain overall  Cont to have mild sx's with car transfers, but "tolerable "        Objective: See treatment diary below    Precautions: Osteoporosis     Specialty Daily Treatment Diary      Manual  18   LAD/SAD R 5 mins  8 mins  5 mins  5 mins  5 mins   Grade III/IV lumbar rotation left SL 5 mins  NP  5 mins  5 mins  5 mins                                                   Exercise Diary              treadmill    1 5 mph 5'  1 5 mph 5'  1 5 mph x6 mins  1 8 mph x8 mins   pball 3 way    10"x5 ea  10"x5 ea  10"x5 ea  10"x5   Hamstring strap st    15"x5 ea  15"x5 ea  20"x5 ea  20"x5   Piriformis st    15"x5 ea  15"x5 ea  20"x5 ea  20"x5   MELLO at wall    3"x10  3"x15  3"x20  5"x20   Side glide to R    3"x10  3"x10  3"x15  5"x15    bridges              supine TrA                                                                                                                                                                                           Modalities             MHP sitting 10 mins  10 mins  10 mins  10 mins  10 mins                                    Assessment:  Pt denies any pain during all exercises  Pt notes having no "sharp" sx's for the last week and has returned to 3 miles of amb per day  Pt does note that she has mild sx's with car transfers and educated to perform with sitting before pulling feet into car  Plan: Progress as able

## 2018-05-03 ENCOUNTER — OFFICE VISIT (OUTPATIENT)
Dept: PHYSICAL THERAPY | Facility: REHABILITATION | Age: 73
End: 2018-05-03
Payer: MEDICARE

## 2018-05-03 DIAGNOSIS — M54.5 LOW BACK PAIN, UNSPECIFIED BACK PAIN LATERALITY, UNSPECIFIED CHRONICITY, WITH SCIATICA PRESENCE UNSPECIFIED: Primary | ICD-10-CM

## 2018-05-03 PROCEDURE — 97110 THERAPEUTIC EXERCISES: CPT

## 2018-05-03 PROCEDURE — 97140 MANUAL THERAPY 1/> REGIONS: CPT

## 2018-05-03 PROCEDURE — 97112 NEUROMUSCULAR REEDUCATION: CPT

## 2018-05-03 NOTE — PROGRESS NOTES
Daily Note     Today's date: 5/3/2018  Patient name: Santiago Lopez  : 1945  MRN: [id number]  Referring provider: Sy Bryant MD  Dx:   Encounter Diagnosis     ICD-10-CM    1  Low back pain, unspecified back pain laterality, unspecified chronicity, with sciatica presence unspecified M54 5                   Subjective: Pt notes improvement in getting in/out of car after education last visit, denies any pain arriving to PT this date  Objective: See treatment diary below    Precautions: Osteoporosis     Specialty Daily Treatment Diary      Manual  5/3/18  4/19/18  4/24/18  4/26/18  4/30/18   LAD/SAD R 8 mins  8 mins  5 mins  5 mins  5 mins   Grade III/IV lumbar rotation left SL NP  NP  5 mins  5 mins  5 mins                                                   Exercise Diary              treadmill  1 8 mph x10 mins  1 5 mph 5'  1 5 mph 5'  1 5 mph x6 mins  1 8 mph x8 mins   pball 3 way  10"x5 ea  10"x5 ea  10"x5 ea  10"x5 ea  10"x5   Hamstring strap st  20"x5  15"x5 ea  15"x5 ea  20"x5 ea  20"x5   Piriformis st  20"x5  15"x5 ea  15"x5 ea  20"x5 ea  20"x5   MELLO at wall  5"x20  3"x10  3"x15  3"x20  5"x20   Side glide to R  5"x15  3"x10  3"x10  3"x15  5"x15    bridges              supine TrA                                                                                                                                                                                           Modalities             MHP sitting 10 mins  10 mins  10 mins  10 mins  10 mins                                    Assessment:  Pt demo's good knowledge of exercise program with min cues for form and no increase in sx's with TE  Pt has good response to SAD/LAD, mobs NP secondary to being painfree  Plan: Progress as able  Pt was supervised 1:1 by Kashmir Lemus DPT from 2:34 - 2:43 pm and was 1:1 with treating clinician for rest of session

## 2018-05-07 ENCOUNTER — OFFICE VISIT (OUTPATIENT)
Dept: PHYSICAL THERAPY | Facility: REHABILITATION | Age: 73
End: 2018-05-07
Payer: MEDICARE

## 2018-05-07 DIAGNOSIS — M54.5 LOW BACK PAIN, UNSPECIFIED BACK PAIN LATERALITY, UNSPECIFIED CHRONICITY, WITH SCIATICA PRESENCE UNSPECIFIED: Primary | ICD-10-CM

## 2018-05-07 PROCEDURE — 97112 NEUROMUSCULAR REEDUCATION: CPT | Performed by: PHYSICAL THERAPIST

## 2018-05-07 PROCEDURE — 97110 THERAPEUTIC EXERCISES: CPT | Performed by: PHYSICAL THERAPIST

## 2018-05-07 NOTE — PROGRESS NOTES
Daily Note     Today's date: 2018  Patient name: Indio Brewer  : 1945  MRN: [id number]  Referring provider: Lakshmi Sweeney MD  Dx:   Encounter Diagnosis     ICD-10-CM    1  Low back pain, unspecified back pain laterality, unspecified chronicity, with sciatica presence unspecified M54 5                   Subjective: Pt notes twinges from time to time but significant decrease in pain since beginning PT  Objective: See treatment diary below    Precautions: Osteoporosis     Specialty Daily Treatment Diary      Manual  5/3/18 5/7/18  4/24/18  4/26/18  4/30/18   LAD/SAD R 8 mins  8 mins  5 mins  5 mins  5 mins   Grade III/IV lumbar rotation left SL  D/c D/c                                                     Exercise Diary              treadmill  1 8 mph x10 mins  2 3 mph 7'  1 5 mph 5'  1 5 mph x6 mins  1 8 mph x8 mins   pball 3 way  10"x5 ea 10"x5 ea  10"x5 ea  10"x5 ea  10"x5   Hamstring strap st  hep 20"x5  15"x5 ea  20"x5 ea  20"x5   Piriformis st  hep 20"x5  15"x5 ea  20"x5 ea  20"x5   MELLO at wall  5"x20 5"x20  3"x15  3"x20  5"x20   Side glide to R  5"x20 5"x15  3"x10  3"x15  5"x15    bridges   3"x10          supine TrA   5"x10                                                                                                                                                                                       Modalities             MHP sitting 10 mins  10 mins  10 mins  10 mins  10 mins                                    Assessment:  Pt demo's diff with initiating TA and bridges, however when cued appropriately pt had no sx's and was able to perform with larger ROM (bridges)  Educated to cont to focus on stretching as part of HEP  Plan: RE next visit with probable DC at that time

## 2018-05-10 ENCOUNTER — EVALUATION (OUTPATIENT)
Dept: PHYSICAL THERAPY | Facility: REHABILITATION | Age: 73
End: 2018-05-10
Payer: MEDICARE

## 2018-05-10 DIAGNOSIS — M54.5 LOW BACK PAIN, UNSPECIFIED BACK PAIN LATERALITY, UNSPECIFIED CHRONICITY, WITH SCIATICA PRESENCE UNSPECIFIED: Primary | ICD-10-CM

## 2018-05-10 PROCEDURE — G8992 OTHER PT/OT  D/C STATUS: HCPCS | Performed by: PHYSICAL THERAPIST

## 2018-05-10 PROCEDURE — 97112 NEUROMUSCULAR REEDUCATION: CPT | Performed by: PHYSICAL THERAPIST

## 2018-05-10 PROCEDURE — G8991 OTHER PT/OT GOAL STATUS: HCPCS | Performed by: PHYSICAL THERAPIST

## 2018-05-10 PROCEDURE — 97140 MANUAL THERAPY 1/> REGIONS: CPT | Performed by: PHYSICAL THERAPIST

## 2018-05-10 NOTE — PROGRESS NOTES
PT Discharge    Today's date: 5/10/2018  Patient name: Aura Holland  : 1945  MRN: [id number]  Referring provider: Michelle Lugo MD  Dx:   Encounter Diagnosis     ICD-10-CM    1  Low back pain, unspecified back pain laterality, unspecified chronicity, with sciatica presence unspecified M54 5                   Assessment    Assessment details: Pt has met most funct and impairment goals with PT  Pt is DC'd from PT to cont with HEP to focus on TA contraction during transfers to assist with pain  Goals  Funct 1  No sleep disturbances due to pain x4 weeks-met  2  No c/o pain with transfers x4 weeks-partially met  3  Amb up to 3 miles per day for recreation x4 weeks-met  Impairment 1  ncrease ROM by 25% x4 weeks-met  2  Decrease pain by 25 % x4 weeks-met      Plan  Plan details: Pt DC'd from PT  Subjective Evaluation    History of Present Illness  Mechanism of injury: Pt notes returning to previously level of funct with up to 95% improvement with PT  Pt notes that she has returned to her 3 mile per day walk with good tolerance, no pain with negotiation of stairs, and less pain with transfers  Pt does cont to have some discomfort with tub and bed transfers and notes improvement with using TA  Pain  Current pain ratin  At best pain ratin  At worst pain ratin          Objective     Neurological Testing     Sensation     Lumbar   Left   Intact: light touch    Right   Intact: light touch    Active Range of Motion     Lumbar   Flexion: 60 degrees   Extension: 15 degrees   Left lateral flexion: 20 degrees   Right lateral flexion: 20 degrees   Left rotation: 60 degrees   Right rotation: 60 degrees     Additional Active Range of Motion Details  Pt has mild TTP present L4-5 on right  Pt denies pain with ROM as above  Strength/Myotome Testing     Lumbar   Left   Normal strength    Right   Normal strength    Tests       Thoracic   Negative slump       Lumbar   Negative repeated flexion, repeated extension and slump  Left   Negative crossed SLR, femoral stretch and passive SLR  Right   Negative crossed SLR, femoral stretch and passive SLR         Flowsheet Rows      Most Recent Value   PT/OT G-Codes   Current Score  72   Projected Score  72   FOTO information reviewed  Yes   Assessment Type  Discharge   G code set  Other PT/OT Primary   Other PT Primary Goal Status ()  CJ   Other PT Primary Discharge Status ()  CJ        Precautions: Osteoporosis     Specialty Daily Treatment Diary      Manual  5/3/18 5/7/18  5/10/18  4/26/18  4/30/18   LAD/SAD R 8 mins  8 mins   5 mins  5 mins   Grade III/IV lumbar rotation left SL   D/c         RE     10 mins                                         Exercise Diary              treadmill  1 8 mph x10 mins  2 3 mph 7'  2 3 mph 10 mins  1 5 mph x6 mins  1 8 mph x8 mins   pball 3 way  10"x5 ea 10"x5 ea  10"x5 ea  10"x5 ea  10"x5   Hamstring strap st  hep 20"x5  15"x5 ea  20"x5 ea  20"x5   Piriformis st  hep 20"x5  15"x5 ea  20"x5 ea  20"x5   MELLO at wall  5"x20 5"x20  3"x15  3"x20  5"x20   Side glide to R  5"x20 5"x15  3"x10  3"x15  5"x15    bridges   3"x10  hep        supine TrA   5"x10  hep                                                                                                                                                                                     Modalities             MHP sitting 10 mins  10 mins  deferred  10 mins  10 mins

## 2018-06-01 LAB
ALBUMIN SERPL-MCNC: 3.2 G/DL (ref 3.6–5.1)
ALBUMIN/GLOB SERPL: 0.9 (CALC) (ref 1–2.5)
ALP SERPL-CCNC: 85 U/L (ref 33–130)
ALT SERPL-CCNC: 17 U/L (ref 6–29)
AST SERPL-CCNC: 38 U/L (ref 10–35)
BILIRUB DIRECT SERPL-MCNC: 0.3 MG/DL
BILIRUB INDIRECT SERPL-MCNC: 1.2 MG/DL (CALC) (ref 0.2–1.2)
BILIRUB SERPL-MCNC: 1.5 MG/DL (ref 0.2–1.2)
GLOBULIN SER CALC-MCNC: 3.5 G/DL (CALC) (ref 1.9–3.7)
PROT SERPL-MCNC: 6.7 G/DL (ref 6.1–8.1)

## 2019-04-27 PROBLEM — Z86.0100 HX OF COLONIC POLYPS: Status: ACTIVE | Noted: 2018-06-12

## 2019-04-27 PROBLEM — Z86.010 HX OF COLONIC POLYPS: Status: ACTIVE | Noted: 2018-06-12

## 2019-04-30 ENCOUNTER — OFFICE VISIT (OUTPATIENT)
Dept: FAMILY MEDICINE CLINIC | Facility: CLINIC | Age: 74
End: 2019-04-30
Payer: MEDICARE

## 2019-04-30 VITALS
WEIGHT: 155.38 LBS | OXYGEN SATURATION: 99 % | HEART RATE: 82 BPM | TEMPERATURE: 97.8 F | DIASTOLIC BLOOD PRESSURE: 90 MMHG | BODY MASS INDEX: 30.51 KG/M2 | HEIGHT: 60 IN | SYSTOLIC BLOOD PRESSURE: 148 MMHG

## 2019-04-30 DIAGNOSIS — Z13.1 SCREENING FOR DIABETES MELLITUS: ICD-10-CM

## 2019-04-30 DIAGNOSIS — Z23 ENCOUNTER FOR IMMUNIZATION: ICD-10-CM

## 2019-04-30 DIAGNOSIS — S01.80XA OPEN WOUND OF FACE, INITIAL ENCOUNTER: ICD-10-CM
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
## 2019-04-30 DIAGNOSIS — R01.1 HEART MURMUR: ICD-10-CM

## 2019-04-30 DIAGNOSIS — Z13.220 LIPID SCREENING: ICD-10-CM

## 2019-04-30 DIAGNOSIS — Z00.00 MEDICARE ANNUAL WELLNESS VISIT, SUBSEQUENT: Primary | ICD-10-CM

## 2019-04-30 DIAGNOSIS — E66.9 OBESITY (BMI 30.0-34.9): ICD-10-CM

## 2019-04-30 DIAGNOSIS — Z13.6 SCREENING FOR CARDIOVASCULAR CONDITION: ICD-10-CM

## 2019-04-30 DIAGNOSIS — M81.0 OSTEOPOROSIS, UNSPECIFIED OSTEOPOROSIS TYPE, UNSPECIFIED PATHOLOGICAL FRACTURE PRESENCE: ICD-10-CM

## 2019-04-30 PROCEDURE — G0439 PPPS, SUBSEQ VISIT: HCPCS | Performed by: PHYSICIAN ASSISTANT

## 2019-04-30 PROCEDURE — 99213 OFFICE O/P EST LOW 20 MIN: CPT | Performed by: PHYSICIAN ASSISTANT

## 2019-04-30 PROCEDURE — 90715 TDAP VACCINE 7 YRS/> IM: CPT

## 2019-04-30 PROCEDURE — 90471 IMMUNIZATION ADMIN: CPT
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
## 2019-06-03 ENCOUNTER — APPOINTMENT (OUTPATIENT)
Dept: RADIOLOGY | Facility: MEDICAL CENTER | Age: 74
End: 2019-06-03
Payer: MEDICARE

## 2019-06-03 ENCOUNTER — OFFICE VISIT (OUTPATIENT)
Dept: FAMILY MEDICINE CLINIC | Facility: CLINIC | Age: 74
End: 2019-06-03
Payer: MEDICARE

## 2019-06-03 VITALS
HEIGHT: 60 IN | WEIGHT: 159.2 LBS | TEMPERATURE: 97.3 F | BODY MASS INDEX: 31.25 KG/M2 | HEART RATE: 75 BPM | DIASTOLIC BLOOD PRESSURE: 80 MMHG | OXYGEN SATURATION: 97 % | SYSTOLIC BLOOD PRESSURE: 150 MMHG

## 2019-06-03 DIAGNOSIS — M79.641 PAIN OF RIGHT HAND: ICD-10-CM
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
## 2019-06-03 PROCEDURE — 99213 OFFICE O/P EST LOW 20 MIN: CPT | Performed by: INTERNAL MEDICINE

## 2019-06-03 PROCEDURE — 73130 X-RAY EXAM OF HAND: CPT

## 2019-06-06 LAB
ALBUMIN SERPL-MCNC: 3.2 G/DL (ref 3.6–5.1)
ALBUMIN/GLOB SERPL: 0.9 (CALC) (ref 1–2.5)
ALP SERPL-CCNC: 79 U/L (ref 33–130)
ALT SERPL-CCNC: 18 U/L (ref 6–29)
AST SERPL-CCNC: 36 U/L (ref 10–35)
BILIRUB SERPL-MCNC: 1.7 MG/DL (ref 0.2–1.2)
BUN SERPL-MCNC: 10 MG/DL (ref 7–25)
BUN/CREAT SERPL: ABNORMAL (CALC) (ref 6–22)
CALCIUM SERPL-MCNC: 9.2 MG/DL (ref 8.6–10.4)
CHLORIDE SERPL-SCNC: 105 MMOL/L (ref 98–110)
CHOLEST SERPL-MCNC: 179 MG/DL
CHOLEST/HDLC SERPL: 2.9 (CALC)
CO2 SERPL-SCNC: 26 MMOL/L (ref 20–32)
CREAT SERPL-MCNC: 0.81 MG/DL (ref 0.6–0.93)
GLOBULIN SER CALC-MCNC: 3.5 G/DL (CALC) (ref 1.9–3.7)
GLUCOSE SERPL-MCNC: 86 MG/DL (ref 65–99)
HDLC SERPL-MCNC: 62 MG/DL
LDLC SERPL CALC-MCNC: 101 MG/DL (CALC)
NONHDLC SERPL-MCNC: 117 MG/DL (CALC)
POTASSIUM SERPL-SCNC: 3.6 MMOL/L (ref 3.5–5.3)
PROT SERPL-MCNC: 6.7 G/DL (ref 6.1–8.1)
SL AMB EGFR AFRICAN AMERICAN: 84 ML/MIN/1.73M2
SL AMB EGFR NON AFRICAN AMERICAN: 72 ML/MIN/1.73M2
SODIUM SERPL-SCNC: 139 MMOL/L (ref 135–146)
TRIGL SERPL-MCNC: 70 MG/DL

## 2019-06-13 ENCOUNTER — HOSPITAL ENCOUNTER (OUTPATIENT)
Dept: NON INVASIVE DIAGNOSTICS | Facility: CLINIC | Age: 74
Discharge: HOME | End: 2019-06-13
Payer: MEDICARE

## 2019-06-13 DIAGNOSIS — R01.1 HEART MURMUR: ICD-10-CM

## 2019-06-13 PROCEDURE — 93306 TTE W/DOPPLER COMPLETE: CPT | Performed by: INTERNAL MEDICINE

## 2019-06-13 PROCEDURE — 93306 TTE W/DOPPLER COMPLETE: CPT

## 2019-06-17 DIAGNOSIS — R01.1 HEART MURMUR: Primary | ICD-10-CM

## 2019-06-17 DIAGNOSIS — R93.1 ABNORMAL ECHOCARDIOGRAM: ICD-10-CM

## 2019-06-20 ENCOUNTER — CONSULT (OUTPATIENT)
Dept: CARDIOLOGY CLINIC | Facility: CLINIC | Age: 74
End: 2019-06-20
Payer: MEDICARE

## 2019-06-20 VITALS
HEIGHT: 61 IN | SYSTOLIC BLOOD PRESSURE: 138 MMHG | WEIGHT: 147 LBS | HEART RATE: 72 BPM | DIASTOLIC BLOOD PRESSURE: 90 MMHG | BODY MASS INDEX: 27.75 KG/M2

## 2019-06-20 DIAGNOSIS — R01.1 HEART MURMUR: Primary | ICD-10-CM

## 2019-06-20 DIAGNOSIS — R93.1 ABNORMAL ECHOCARDIOGRAM: ICD-10-CM

## 2019-06-20 PROCEDURE — 93000 ELECTROCARDIOGRAM COMPLETE: CPT | Performed by: INTERNAL MEDICINE

## 2019-06-20 PROCEDURE — 99214 OFFICE O/P EST MOD 30 MIN: CPT | Performed by: INTERNAL MEDICINE

## 2019-06-20 RX ORDER — LOSARTAN POTASSIUM AND HYDROCHLOROTHIAZIDE 12.5; 5 MG/1; MG/1
1 TABLET ORAL DAILY
Qty: 30 TABLET | Refills: 5 | Status: SHIPPED | OUTPATIENT
Start: 2019-06-20 | End: 2019-12-12 | Stop reason: SDUPTHER

## 2019-06-28 ENCOUNTER — TELEPHONE (OUTPATIENT)
Dept: CARDIOLOGY CLINIC | Facility: CLINIC | Age: 74
End: 2019-06-28

## 2019-07-08 LAB
BUN SERPL-MCNC: 15 MG/DL (ref 7–25)
BUN/CREAT SERPL: ABNORMAL (CALC) (ref 6–22)
CALCIUM SERPL-MCNC: 9.7 MG/DL (ref 8.6–10.4)
CHLORIDE SERPL-SCNC: 104 MMOL/L (ref 98–110)
CO2 SERPL-SCNC: 27 MMOL/L (ref 20–32)
CREAT SERPL-MCNC: 0.79 MG/DL (ref 0.6–0.93)
GLUCOSE SERPL-MCNC: 95 MG/DL (ref 65–99)
POTASSIUM SERPL-SCNC: 3.3 MMOL/L (ref 3.5–5.3)
SL AMB EGFR AFRICAN AMERICAN: 86 ML/MIN/1.73M2
SL AMB EGFR NON AFRICAN AMERICAN: 74 ML/MIN/1.73M2
SODIUM SERPL-SCNC: 139 MMOL/L (ref 135–146)

## 2019-07-11 DIAGNOSIS — I10 ESSENTIAL HYPERTENSION: Primary | ICD-10-CM

## 2019-07-11 RX ORDER — POTASSIUM CHLORIDE 750 MG/1
10 TABLET, EXTENDED RELEASE ORAL DAILY
Qty: 30 TABLET | Refills: 5 | Status: SHIPPED | OUTPATIENT
Start: 2019-07-11 | End: 2019-12-12 | Stop reason: SDUPTHER

## 2019-09-05 ENCOUNTER — OFFICE VISIT (OUTPATIENT)
Dept: CARDIOLOGY CLINIC | Facility: CLINIC | Age: 74
End: 2019-09-05
Payer: MEDICARE

## 2019-09-05 VITALS
BODY MASS INDEX: 27.79 KG/M2 | DIASTOLIC BLOOD PRESSURE: 64 MMHG | WEIGHT: 147.2 LBS | RESPIRATION RATE: 16 BRPM | SYSTOLIC BLOOD PRESSURE: 118 MMHG | HEART RATE: 72 BPM | HEIGHT: 61 IN

## 2019-09-05 DIAGNOSIS — R01.1 HEART MURMUR: Primary | ICD-10-CM

## 2019-09-05 PROCEDURE — 99213 OFFICE O/P EST LOW 20 MIN: CPT | Performed by: INTERNAL MEDICINE

## 2019-09-05 NOTE — PROGRESS NOTES
[address] [facility] [place]  [address] [facility]  [phone]    Cardiology Follow up    Patient:  Ruben Zarate  :  1945  MRN:  [id number]    History of Present Illness:      16-year-old female with past medical history of osteoporosis presents     Patient Active Problem List   Diagnosis    Dietary calcium deficiency    Obesity (BMI 30 0-34  9)    Osteoporosis    Hx of colonic polyps    Heart murmur    Fall       Past Surgical History  Past Surgical History:   Procedure Laterality Date    COLONOSCOPY      COMPLETE; DUE 3 YRS    DILATION AND CURETTAGE OF UTERUS  2013    THICKENED ENDO ON USG, CVX STENOSIS, PATH: BENIGN POLYP FRAGMENTS AND INACTIVE ENDOMETRIUM  NO NEOPLASIA ; IMPRESSION: 10/27/14; RISA MORRIS    HYSTEROSCOPY      TONSILLECTOMY         Social History   Social History     Socioeconomic History    Marital status: Single     Spouse name: Not on file    Number of children: Not on file    Years of education: Not on file    Highest education level: Not on file   Occupational History    Occupation: [employer]   Social Needs    Financial resource strain: Not on file    Food insecurity:     Worry: Not on file     Inability: Not on file    Transportation needs:     Medical: Not on file     Non-medical: Not on file   Tobacco Use    Smoking status: Former Smoker     Packs/day: 0 50     Years: 5 00     Pack years: 2 50    Smokeless tobacco: Never Used    Tobacco comment: STARTED AT AGE 18   STOPPED AT AGE 23     Substance and Sexual Activity    Alcohol use: No    Drug use: No    Sexual activity: Not on file   Lifestyle    Physical activity:     Days per week: Not on file     Minutes per session: Not on file    Stress: Not on file   Relationships    Social connections:     Talks on phone: Not on file     Gets together: Not on file     Attends [employer] service: Not on file     Active member of club or organization: Not on file Attends meetings of clubs or organizations: Not on file     Relationship status: Not on file    Intimate partner violence:     Fear of current or ex partner: Not on file     Emotionally abused: Not on file     Physically abused: Not on file     Forced sexual activity: Not on file   Other Topics Concern    Not on file   Social History Narrative    EXERCISES MODERATELY LESS THAN 3 TIMES WEEK        No Known Allergies    Family History   Family History   Problem Relation Age of Onset    Kidney cancer Mother 79        RENAL CANCER    Hypertension Mother     Skin cancer Mother     Osteoporosis Mother     Osteoporotic fracture Mother         hip - age 80     Osteoporotic fracture Paternal Grandmother        Review of Systems:  Review of Systems   Constitutional: Negative for chills, fatigue and fever  HENT: Negative for hearing loss, nosebleeds and tinnitus  Eyes: Negative for pain  Respiratory: Negative for cough, chest tightness and shortness of breath  Cardiovascular: Negative for chest pain, palpitations and leg swelling  Gastrointestinal: Negative for abdominal pain, nausea and vomiting  Endocrine: Negative for cold intolerance and heat intolerance  Genitourinary: Negative for difficulty urinating, hematuria and vaginal bleeding  Musculoskeletal: Negative for arthralgias  Skin: Negative for rash  Allergic/Immunologic: Negative for environmental allergies  Neurological: Negative for dizziness, syncope, weakness and light-headedness  Psychiatric/Behavioral: Negative for decreased concentration and sleep disturbance  The patient is not nervous/anxious            Current Outpatient Medications:     aspirin 81 MG tablet, Take 81 mg by mouth daily  , Disp: , Rfl:     Calcium Carbonate-Vitamin D (CALCIUM 600+D) 600-200 MG-UNIT TABS, Take 1 tablet by mouth 2 (two) times a day, Disp: , Rfl:     Cholecalciferol (VITAMIN D3) 1000 units CAPS, Take by mouth daily  , Disp: , Rfl:     denosumab (PROLIA) 60 mg/mL, Inject under the skin every 6 (six) months  , Disp: , Rfl:     losartan-hydrochlorothiazide (HYZAAR) 50-12 5 mg per tablet, Take 1 tablet by mouth daily, Disp: 30 tablet, Rfl: 5    Multiple Vitamins-Minerals (DAILY MULTI PO), Take by mouth daily  , Disp: , Rfl:     potassium chloride (K-DUR,KLOR-CON) 10 mEq tablet, Take 1 tablet (10 mEq total) by mouth daily, Disp: 30 tablet, Rfl: 5     Physical Exam:    Vitals:    09/05/19 1331   BP: 118/64   Pulse: 72   Resp: 16   Weight: 66 8 kg (147 lb 3 2 oz)   Height: 5' 1" (1 549 m)       Physical Exam   Constitutional: She is oriented to person, place, and time  She appears well-developed and well-nourished  HENT:   Head: Normocephalic  Right Ear: External ear normal    Left Ear: External ear normal    Mouth/Throat: Oropharynx is clear and moist    Eyes: Pupils are equal, round, and reactive to light  Neck: No JVD present  Carotid bruit is not present  Cardiovascular: Normal rate, regular rhythm and intact distal pulses  Exam reveals no gallop and no friction rub  No murmur heard  Pulmonary/Chest: Effort normal and breath sounds normal  No tachypnea  No respiratory distress  She has no wheezes  She has no rales  She exhibits no tenderness  Abdominal: Soft  She exhibits no distension  There is no tenderness  There is no rebound and no guarding  Musculoskeletal: She exhibits no edema  Neurological: She is alert and oriented to person, place, and time  Skin: Skin is warm and dry  Psychiatric: She has a normal mood and affect  Her behavior is normal  Judgment and thought content normal    Nursing note and vitals reviewed  Labs:not applicable    Assessment/Plan:    1  Hypertension  This is now well controlled  2  Prevention of cardiovascular disease  As we discussed last time the statin is indicated based on her 10 year risk  She would like to hold off on this-I told her she could consider red yeast rice      We will see her back as needed  Thank you so much, please do not hesitate to contact me with any questions or concerns  Thank you so much, please not hesitate to contact me with any questions or concerns        Siddharth Martin MD  9/5/2019  1:43 PM

## 2019-09-26 ENCOUNTER — OFFICE VISIT (OUTPATIENT)
Dept: FAMILY MEDICINE CLINIC | Facility: CLINIC | Age: 74
End: 2019-09-26
Payer: MEDICARE

## 2019-09-26 VITALS
TEMPERATURE: 98 F | SYSTOLIC BLOOD PRESSURE: 128 MMHG | BODY MASS INDEX: 29.85 KG/M2 | WEIGHT: 158 LBS | HEART RATE: 67 BPM | DIASTOLIC BLOOD PRESSURE: 70 MMHG | OXYGEN SATURATION: 98 %

## 2019-09-26 DIAGNOSIS — H00.015 HORDEOLUM EXTERNUM OF LEFT LOWER EYELID: Primary | ICD-10-CM

## 2019-09-26 DIAGNOSIS — Z23 NEED FOR INFLUENZA VACCINATION: ICD-10-CM

## 2019-09-26 PROCEDURE — 90662 IIV NO PRSV INCREASED AG IM: CPT | Performed by: FAMILY MEDICINE

## 2019-09-26 PROCEDURE — 99213 OFFICE O/P EST LOW 20 MIN: CPT | Performed by: FAMILY MEDICINE

## 2019-09-26 PROCEDURE — G0008 ADMIN INFLUENZA VIRUS VAC: HCPCS | Performed by: FAMILY MEDICINE

## 2019-09-26 RX ORDER — ERYTHROMYCIN 5 MG/G
0.5 OINTMENT OPHTHALMIC
Qty: 3.5 G | Refills: 1 | Status: SHIPPED | OUTPATIENT
Start: 2019-09-26 | End: 2019-10-03

## 2019-09-26 NOTE — PROGRESS NOTES
Assessment/Plan:    She appears to have a stye of the left lower eyelid  It is somewhat red and irritated in appearance  I have given her prescription for erythromycin ointment to apply at bedtime and recommended warm compresses several times per day  She should contact us if symptoms are not resolving and will refer her to Ophthalmology  Diagnoses and all orders for this visit:    Hordeolum externum of left lower eyelid  -     erythromycin (ILOTYCIN) ophthalmic ointment; Administer 0 5 inches into the left eye daily at bedtime for 7 days    Need for influenza vaccination  -     influenza vaccine, [id number], high-dose, PF 0 5 mL (FLUZONE HIGH-DOSE)          Subjective:      Patient ID: Kiara Mcguire is a 69 y o  female  She is here with complaint of left lower eyelid which has been present for about a week and half  It is a little itchy but not painful  She has been doing warm compresses twice a day which has not helped so far  She denies visual problems  She denies drainage from the eye  The following portions of the patient's history were reviewed and updated as appropriate: allergies, current medications, past family history, past medical history, past social history, past surgical history and problem list     Review of Systems   Constitutional: Negative for activity change, chills and fever  HENT: Positive for congestion  Eyes: Positive for itching  Negative for photophobia, pain, discharge and visual disturbance  Neurological: Negative for dizziness and headaches  Objective:      /70 (BP Location: Left arm, Patient Position: Sitting, Cuff Size: Standard)   Pulse 67   Temp 98 °F (36 7 °C) (Tympanic)   Wt 71 7 kg (158 lb)   SpO2 98%   BMI 29 85 kg/m²          Physical Exam   Constitutional: She appears well-developed and well-nourished  HENT:   Head: Normocephalic and atraumatic  Eyes: Pupils are equal, round, and reactive to light   Conjunctivae and EOM are normal    Left lower eyelid with area of erythema and central point  No conjunctival injection     Neck: Normal range of motion  Neck supple  Cardiovascular: Normal rate and regular rhythm  Pulmonary/Chest: Effort normal and breath sounds normal    Nursing note and vitals reviewed

## 2019-10-03 ENCOUNTER — TELEPHONE (OUTPATIENT)
Dept: FAMILY MEDICINE CLINIC | Facility: CLINIC | Age: 74
End: 2019-10-03

## 2019-10-03 NOTE — TELEPHONE ENCOUNTER
Pt called stating she is on day seven of the erythromycin ointment and has had no change at all in her eye problem  Pt states she called and scheduled an appt with Dr Scar Mathias but was not able to get an appt until 10/22  Pt asked if she should continue the ointment in the meantime  I let her know with her using it for 7 days already with no change, she should likely stop using the ointment but I would check with you for any further suggestions  I also recommended she call the other ophthalmologist office to see if they can get her in for an appt sooner  Please advise

## 2019-10-03 NOTE — TELEPHONE ENCOUNTER
Would agree that she may stop using the antibiotic ointment but I would like her to be seen by ophthalmologist as soon as possible  She should call back to Dr Thomas Sepulveda office and see if another doctor can see her sooner there  If no help, would recommend [facility]

## 2019-11-19 ENCOUNTER — OFFICE VISIT (OUTPATIENT)
Dept: FAMILY MEDICINE CLINIC | Facility: CLINIC | Age: 74
End: 2019-11-19
Payer: MEDICARE

## 2019-11-19 VITALS
HEIGHT: 61 IN | OXYGEN SATURATION: 97 % | BODY MASS INDEX: 29.6 KG/M2 | WEIGHT: 156.8 LBS | DIASTOLIC BLOOD PRESSURE: 80 MMHG | SYSTOLIC BLOOD PRESSURE: 130 MMHG | HEART RATE: 70 BPM | RESPIRATION RATE: 18 BRPM

## 2019-11-19 DIAGNOSIS — J20.9 ACUTE BRONCHITIS, UNSPECIFIED ORGANISM: Primary | ICD-10-CM

## 2019-11-19 PROCEDURE — 99213 OFFICE O/P EST LOW 20 MIN: CPT | Performed by: INTERNAL MEDICINE

## 2019-11-19 RX ORDER — BENZONATATE 100 MG/1
100 CAPSULE ORAL 3 TIMES DAILY PRN
Qty: 20 CAPSULE | Refills: 0 | Status: SHIPPED | OUTPATIENT
Start: 2019-11-19 | End: 2020-04-23

## 2019-11-19 RX ORDER — AMOXICILLIN 875 MG/1
875 TABLET, COATED ORAL 2 TIMES DAILY
Qty: 14 TABLET | Refills: 0 | Status: SHIPPED | OUTPATIENT
Start: 2019-11-19 | End: 2019-11-26

## 2019-11-19 NOTE — PROGRESS NOTES
Assessment/Plan:     Diagnoses and all orders for this visit:    Acute bronchitis, unspecified organism  -     benzonatate (TESSALON PERLES) 100 mg capsule; Take 1 capsule (100 mg total) by mouth 3 (three) times a day as needed for cough  -     amoxicillin (AMOXIL) 875 mg tablet; Take 1 tablet (875 mg total) by mouth 2 (two) times a day for 7 days     Violeta's examination today was largely normal  She does have some rhinitis with persistent cough  I would like her to try symptomatic treatment with the tessalon perles/Flonase first  If no improvement, she will take the prescribed abx given the long period of time that she has had symptoms  Otherwise no other changes were made  Subjective:      Patient ID: Finesse Diaz is a 69 y o  female  Zarina Faria is here today for a sick visit  Symptoms started a few weeks ago  Reports no travel or known sick contacts  See below  URI    This is a new problem  The current episode started 1 to 4 weeks ago  The problem has been unchanged  There has been no fever  Associated symptoms include congestion, coughing, rhinorrhea, a sore throat and vomiting  Pertinent negatives include no diarrhea, ear pain, headaches, nausea, plugged ear sensation or sinus pain  Treatments tried: Coricidin  The treatment provided mild relief  The following portions of the patient's history were reviewed and updated as appropriate: allergies, current medications, past family history, past medical history, past social history, past surgical history and problem list     Review of Systems   HENT: Positive for congestion, rhinorrhea and sore throat  Negative for ear pain and sinus pain  Respiratory: Positive for cough  Gastrointestinal: Positive for vomiting  Negative for diarrhea and nausea  Neurological: Negative for headaches           Objective:      /80   Pulse 70   Resp 18   Ht 5' 1" (1 549 m)   Wt 71 1 kg (156 lb 12 8 oz)   SpO2 97%   BMI 29 63 kg/m²          Physical Exam   Constitutional: She is oriented to person, place, and time  She appears well-developed and well-nourished  No distress  HENT:   Head: Normocephalic and atraumatic  Right Ear: External ear normal    Left Ear: External ear normal    Mouth/Throat: Oropharynx is clear and moist    Eyes: Conjunctivae and EOM are normal  Right eye exhibits no discharge  Left eye exhibits no discharge  No scleral icterus  Neck: Normal range of motion  Cardiovascular: Normal rate and regular rhythm  Pulmonary/Chest: Effort normal and breath sounds normal  No respiratory distress  She has no wheezes  Musculoskeletal: Normal range of motion  She exhibits no edema  Lymphadenopathy:     She has no cervical adenopathy  Neurological: She is alert and oriented to person, place, and time  Skin: Skin is warm and dry  She is not diaphoretic  Psychiatric: She has a normal mood and affect  Her speech is normal and behavior is normal  Judgment and thought content normal    Vitals reviewed

## 2019-12-09 DIAGNOSIS — R93.1 ABNORMAL ECHOCARDIOGRAM: ICD-10-CM

## 2019-12-09 DIAGNOSIS — I10 ESSENTIAL HYPERTENSION: ICD-10-CM

## 2019-12-11 RX ORDER — LOSARTAN POTASSIUM AND HYDROCHLOROTHIAZIDE 12.5; 5 MG/1; MG/1
1 TABLET ORAL DAILY
Qty: 30 TABLET | Refills: 5 | Status: CANCELLED | OUTPATIENT
Start: 2019-12-11

## 2019-12-12 RX ORDER — LOSARTAN POTASSIUM AND HYDROCHLOROTHIAZIDE 12.5; 5 MG/1; MG/1
1 TABLET ORAL DAILY
Qty: 30 TABLET | Refills: 3 | Status: SHIPPED | OUTPATIENT
Start: 2019-12-12 | End: 2019-12-16 | Stop reason: SDUPTHER

## 2019-12-12 RX ORDER — POTASSIUM CHLORIDE 750 MG/1
10 TABLET, EXTENDED RELEASE ORAL DAILY
Qty: 30 TABLET | Refills: 3 | Status: SHIPPED | OUTPATIENT
Start: 2019-12-12 | End: 2020-05-19 | Stop reason: SDUPTHER

## 2019-12-12 NOTE — TELEPHONE ENCOUNTER
Refills approved  Please encourage patient to schedule Medicare annual wellness visit in April when she will be due

## 2019-12-16 DIAGNOSIS — R93.1 ABNORMAL ECHOCARDIOGRAM: ICD-10-CM

## 2019-12-23 RX ORDER — LOSARTAN POTASSIUM AND HYDROCHLOROTHIAZIDE 12.5; 5 MG/1; MG/1
1 TABLET ORAL DAILY
Qty: 30 TABLET | Refills: 5 | Status: SHIPPED | OUTPATIENT
Start: 2019-12-23 | End: 2020-01-16 | Stop reason: CLARIF

## 2020-01-16 DIAGNOSIS — I10 ESSENTIAL HYPERTENSION: Primary | ICD-10-CM

## 2020-01-16 DIAGNOSIS — R93.1 ABNORMAL ECHOCARDIOGRAM: ICD-10-CM

## 2020-01-16 RX ORDER — HYDROCHLOROTHIAZIDE 12.5 MG/1
12.5 TABLET ORAL DAILY
Qty: 30 TABLET | Refills: 5 | Status: SHIPPED | OUTPATIENT
Start: 2020-01-16 | End: 2020-07-13 | Stop reason: SDUPTHER

## 2020-01-16 RX ORDER — LOSARTAN POTASSIUM 50 MG/1
50 TABLET ORAL DAILY
Qty: 30 TABLET | Refills: 5 | Status: SHIPPED | OUTPATIENT
Start: 2020-01-16 | End: 2020-07-13 | Stop reason: SDUPTHER

## 2020-01-16 NOTE — TELEPHONE ENCOUNTER
Received a called from [facility] requesting a new rx to replaced losartan -HCTZ is on backorder  Can you please sent a separate RX   Pt notified

## 2020-01-17 ENCOUNTER — TELEPHONE (OUTPATIENT)
Dept: FAMILY MEDICINE CLINIC | Facility: CLINIC | Age: 75
End: 2020-01-17

## 2020-01-17 DIAGNOSIS — E87.6 HYPOKALEMIA: Primary | ICD-10-CM

## 2020-01-17 NOTE — TELEPHONE ENCOUNTER
Please let her know that it should not be different from what she was previously taking  Also her last potassium level was a little low and it will be rechecked with her next labs

## 2020-01-17 NOTE — TELEPHONE ENCOUNTER
Pt called today, she wants to make sure is she can continue taking her potassium Chloride 10 MEQ since her new RX for Losartan said Losartan Potassium, please advise

## 2020-01-20 NOTE — TELEPHONE ENCOUNTER
Spoke with pt, Pt had labs done on 10/10/2019 at [address] [place] [address] and her potassium was 3 9, Called [facility] and they will be faxing a copy of her labs

## 2020-04-15 ENCOUNTER — TELEPHONE (OUTPATIENT)
Dept: FAMILY MEDICINE CLINIC | Facility: CLINIC | Age: 75
End: 2020-04-15

## 2020-04-23 ENCOUNTER — TELEMEDICINE (OUTPATIENT)
Dept: FAMILY MEDICINE CLINIC | Facility: CLINIC | Age: 75
End: 2020-04-23
Payer: MEDICARE

## 2020-04-23 VITALS — BODY MASS INDEX: 29.1 KG/M2 | DIASTOLIC BLOOD PRESSURE: 64 MMHG | WEIGHT: 154 LBS | SYSTOLIC BLOOD PRESSURE: 112 MMHG

## 2020-04-23 DIAGNOSIS — Z00.00 MEDICARE ANNUAL WELLNESS VISIT, SUBSEQUENT: Primary | ICD-10-CM

## 2020-04-23 DIAGNOSIS — E66.3 OVERWEIGHT: ICD-10-CM

## 2020-04-23 DIAGNOSIS — R73.01 IMPAIRED FASTING BLOOD SUGAR: ICD-10-CM

## 2020-04-23 DIAGNOSIS — M81.0 OSTEOPOROSIS, UNSPECIFIED OSTEOPOROSIS TYPE, UNSPECIFIED PATHOLOGICAL FRACTURE PRESENCE: ICD-10-CM

## 2020-04-23 PROCEDURE — 4040F PNEUMOC VAC/ADMIN/RCVD: CPT | Performed by: PHYSICIAN ASSISTANT

## 2020-04-23 PROCEDURE — G0439 PPPS, SUBSEQ VISIT: HCPCS | Performed by: PHYSICIAN ASSISTANT

## 2020-04-23 PROCEDURE — 1125F AMNT PAIN NOTED PAIN PRSNT: CPT | Performed by: PHYSICIAN ASSISTANT

## 2020-04-23 PROCEDURE — 99214 OFFICE O/P EST MOD 30 MIN: CPT | Performed by: PHYSICIAN ASSISTANT

## 2020-04-23 PROCEDURE — 3074F SYST BP LT 130 MM HG: CPT | Performed by: PHYSICIAN ASSISTANT

## 2020-04-23 PROCEDURE — 1123F ACP DISCUSS/DSCN MKR DOCD: CPT | Performed by: PHYSICIAN ASSISTANT

## 2020-04-23 PROCEDURE — 1036F TOBACCO NON-USER: CPT | Performed by: PHYSICIAN ASSISTANT

## 2020-04-23 PROCEDURE — 3078F DIAST BP <80 MM HG: CPT | Performed by: PHYSICIAN ASSISTANT

## 2020-04-23 PROCEDURE — 1160F RVW MEDS BY RX/DR IN RCRD: CPT | Performed by: PHYSICIAN ASSISTANT

## 2020-04-23 PROCEDURE — 1170F FXNL STATUS ASSESSED: CPT | Performed by: PHYSICIAN ASSISTANT

## 2020-05-19 DIAGNOSIS — I10 ESSENTIAL HYPERTENSION: ICD-10-CM

## 2020-05-19 RX ORDER — POTASSIUM CHLORIDE 750 MG/1
10 TABLET, EXTENDED RELEASE ORAL DAILY
Qty: 30 TABLET | Refills: 5 | Status: SHIPPED | OUTPATIENT
Start: 2020-05-19 | End: 2020-12-03 | Stop reason: SDUPTHER

## 2020-06-10 LAB
ALBUMIN SERPL-MCNC: 3.2 G/DL (ref 3.6–5.1)
ALBUMIN/GLOB SERPL: 0.9 (CALC) (ref 1–2.5)
ALP SERPL-CCNC: 70 U/L (ref 37–153)
ALT SERPL-CCNC: 18 U/L (ref 6–29)
AST SERPL-CCNC: 39 U/L (ref 10–35)
BASOPHILS # BLD AUTO: 20 CELLS/UL (ref 0–200)
BASOPHILS NFR BLD AUTO: 0.5 %
BILIRUB SERPL-MCNC: 1.8 MG/DL (ref 0.2–1.2)
BUN SERPL-MCNC: 13 MG/DL (ref 7–25)
BUN/CREAT SERPL: ABNORMAL (CALC) (ref 6–22)
CALCIUM SERPL-MCNC: 9.6 MG/DL (ref 8.6–10.4)
CHLORIDE SERPL-SCNC: 105 MMOL/L (ref 98–110)
CHOLEST SERPL-MCNC: 174 MG/DL
CHOLEST/HDLC SERPL: 2.7 (CALC)
CO2 SERPL-SCNC: 26 MMOL/L (ref 20–32)
CREAT SERPL-MCNC: 0.75 MG/DL (ref 0.6–0.93)
EOSINOPHIL # BLD AUTO: 160 CELLS/UL (ref 15–500)
EOSINOPHIL NFR BLD AUTO: 4.1 %
ERYTHROCYTE [DISTWIDTH] IN BLOOD BY AUTOMATED COUNT: 13.2 % (ref 11–15)
GLOBULIN SER CALC-MCNC: 3.4 G/DL (CALC) (ref 1.9–3.7)
GLUCOSE SERPL-MCNC: 95 MG/DL (ref 65–99)
HBA1C MFR BLD: 6.3 % OF TOTAL HGB
HCT VFR BLD AUTO: 35.9 % (ref 35–45)
HDLC SERPL-MCNC: 64 MG/DL
HGB BLD-MCNC: 12.2 G/DL (ref 11.7–15.5)
LDLC SERPL CALC-MCNC: 95 MG/DL (CALC)
LYMPHOCYTES # BLD AUTO: 1291 CELLS/UL (ref 850–3900)
LYMPHOCYTES NFR BLD AUTO: 33.1 %
MCH RBC QN AUTO: 35.1 PG (ref 27–33)
MCHC RBC AUTO-ENTMCNC: 34 G/DL (ref 32–36)
MCV RBC AUTO: 103.2 FL (ref 80–100)
MONOCYTES # BLD AUTO: 257 CELLS/UL (ref 200–950)
MONOCYTES NFR BLD AUTO: 6.6 %
NEUTROPHILS # BLD AUTO: 2172 CELLS/UL (ref 1500–7800)
NEUTROPHILS NFR BLD AUTO: 55.7 %
NONHDLC SERPL-MCNC: 110 MG/DL (CALC)
PLATELET # BLD AUTO: 121 THOUSAND/UL (ref 140–400)
PMV BLD REES-ECKER: 10.7 FL (ref 7.5–12.5)
POTASSIUM SERPL-SCNC: 3.6 MMOL/L (ref 3.5–5.3)
PROT SERPL-MCNC: 6.6 G/DL (ref 6.1–8.1)
RBC # BLD AUTO: 3.48 MILLION/UL (ref 3.8–5.1)
SL AMB EGFR AFRICAN AMERICAN: 91 ML/MIN/1.73M2
SL AMB EGFR NON AFRICAN AMERICAN: 79 ML/MIN/1.73M2
SODIUM SERPL-SCNC: 139 MMOL/L (ref 135–146)
TRIGL SERPL-MCNC: 64 MG/DL
WBC # BLD AUTO: 3.9 THOUSAND/UL (ref 3.8–10.8)

## 2020-06-11 DIAGNOSIS — R71.8 ABNORMAL RED BLOOD CELLS: ICD-10-CM

## 2020-06-11 DIAGNOSIS — R73.03 PREDIABETES: ICD-10-CM

## 2020-06-11 DIAGNOSIS — R79.89 ABNORMAL BILIRUBIN TEST: Primary | ICD-10-CM

## 2020-07-13 DIAGNOSIS — I10 ESSENTIAL HYPERTENSION: ICD-10-CM

## 2020-07-13 RX ORDER — LOSARTAN POTASSIUM 50 MG/1
50 TABLET ORAL DAILY
Qty: 30 TABLET | Refills: 5 | Status: SHIPPED | OUTPATIENT
Start: 2020-07-13 | End: 2021-01-11 | Stop reason: SDUPTHER

## 2020-07-13 RX ORDER — HYDROCHLOROTHIAZIDE 12.5 MG/1
12.5 TABLET ORAL DAILY
Qty: 30 TABLET | Refills: 5 | Status: SHIPPED | OUTPATIENT
Start: 2020-07-13 | End: 2021-01-11 | Stop reason: SDUPTHER

## 2020-10-14 LAB — HBA1C MFR BLD HPLC: 6.2 %

## 2020-11-04 ENCOUNTER — TELEPHONE (OUTPATIENT)
Dept: FAMILY MEDICINE CLINIC | Facility: CLINIC | Age: 75
End: 2020-11-04

## 2020-11-04 DIAGNOSIS — E87.6 HYPOKALEMIA: Primary | ICD-10-CM

## 2020-12-03 DIAGNOSIS — I10 ESSENTIAL HYPERTENSION: ICD-10-CM

## 2020-12-04 RX ORDER — POTASSIUM CHLORIDE 750 MG/1
10 TABLET, EXTENDED RELEASE ORAL DAILY
Qty: 30 TABLET | Refills: 5 | Status: SHIPPED | OUTPATIENT
Start: 2020-12-04 | End: 2021-04-26 | Stop reason: SDUPTHER

## 2020-12-11 LAB
ALBUMIN SERPL-MCNC: 3.4 G/DL (ref 3.6–5.1)
ALBUMIN/GLOB SERPL: 1 (CALC) (ref 1–2.5)
ALP SERPL-CCNC: 89 U/L (ref 37–153)
ALT SERPL-CCNC: 22 U/L (ref 6–29)
AST SERPL-CCNC: 40 U/L (ref 10–35)
BASOPHILS # BLD AUTO: 28 CELLS/UL (ref 0–200)
BASOPHILS NFR BLD AUTO: 0.6 %
BILIRUB DIRECT SERPL-MCNC: 0.4 MG/DL
BILIRUB INDIRECT SERPL-MCNC: 1.2 MG/DL (CALC) (ref 0.2–1.2)
BILIRUB SERPL-MCNC: 1.6 MG/DL (ref 0.2–1.2)
BUN SERPL-MCNC: 13 MG/DL (ref 7–25)
BUN/CREAT SERPL: ABNORMAL (CALC) (ref 6–22)
CALCIUM SERPL-MCNC: 10.4 MG/DL (ref 8.6–10.4)
CHLORIDE SERPL-SCNC: 103 MMOL/L (ref 98–110)
CO2 SERPL-SCNC: 27 MMOL/L (ref 20–32)
CREAT SERPL-MCNC: 0.82 MG/DL (ref 0.6–0.93)
EOSINOPHIL # BLD AUTO: 193 CELLS/UL (ref 15–500)
EOSINOPHIL NFR BLD AUTO: 4.1 %
ERYTHROCYTE [DISTWIDTH] IN BLOOD BY AUTOMATED COUNT: 12.5 % (ref 11–15)
GLOBULIN SER CALC-MCNC: 3.4 G/DL (CALC) (ref 1.9–3.7)
GLUCOSE SERPL-MCNC: 114 MG/DL (ref 65–99)
HCT VFR BLD AUTO: 36.6 % (ref 35–45)
HGB BLD-MCNC: 12.4 G/DL (ref 11.7–15.5)
LYMPHOCYTES # BLD AUTO: 1203 CELLS/UL (ref 850–3900)
LYMPHOCYTES NFR BLD AUTO: 25.6 %
MCH RBC QN AUTO: 35.3 PG (ref 27–33)
MCHC RBC AUTO-ENTMCNC: 33.9 G/DL (ref 32–36)
MCV RBC AUTO: 104.3 FL (ref 80–100)
MONOCYTES # BLD AUTO: 381 CELLS/UL (ref 200–950)
MONOCYTES NFR BLD AUTO: 8.1 %
NEUTROPHILS # BLD AUTO: 2895 CELLS/UL (ref 1500–7800)
NEUTROPHILS NFR BLD AUTO: 61.6 %
PLATELET # BLD AUTO: 121 THOUSAND/UL (ref 140–400)
PMV BLD REES-ECKER: 11 FL (ref 7.5–12.5)
POTASSIUM SERPL-SCNC: 3.8 MMOL/L (ref 3.5–5.3)
PROT SERPL-MCNC: 6.8 G/DL (ref 6.1–8.1)
RBC # BLD AUTO: 3.51 MILLION/UL (ref 3.8–5.1)
SL AMB EGFR AFRICAN AMERICAN: 82 ML/MIN/1.73M2
SL AMB EGFR NON AFRICAN AMERICAN: 70 ML/MIN/1.73M2
SODIUM SERPL-SCNC: 139 MMOL/L (ref 135–146)
WBC # BLD AUTO: 4.7 THOUSAND/UL (ref 3.8–10.8)

## 2020-12-21 ENCOUNTER — DOCTOR'S OFFICE (OUTPATIENT)
Dept: URBAN - METROPOLITAN AREA CLINIC 136 | Facility: CLINIC | Age: 75
Setting detail: OPHTHALMOLOGY
End: 2020-12-21
Payer: COMMERCIAL

## 2020-12-21 DIAGNOSIS — H52.4: ICD-10-CM

## 2020-12-21 DIAGNOSIS — H25.13: ICD-10-CM

## 2020-12-21 PROBLEM — H52.223 ASTIGMATISM, REGULAR; BOTH EYES: Status: ACTIVE | Noted: 2020-12-21

## 2020-12-21 PROCEDURE — 92004 COMPRE OPH EXAM NEW PT 1/>: CPT | Performed by: OPTOMETRIST

## 2020-12-21 PROCEDURE — 92015 DETERMINE REFRACTIVE STATE: CPT | Performed by: OPTOMETRIST

## 2020-12-21 ASSESSMENT — REFRACTION_AUTOREFRACTION
OS_SPHERE: ERROR
OD_SPHERE: +0.75
OD_CYLINDER: -1.25
OD_AXIS: 137

## 2020-12-21 ASSESSMENT — REFRACTION_CURRENTRX
OS_OVR_VA: 20/
OS_SPHERE: PLANO
OD_AXIS: 084
OD_ADD: +2.25
OD_OVR_VA: 20/
OD_SPHERE: +1.00
OD_CYLINDER: -2.25

## 2020-12-21 ASSESSMENT — REFRACTION_MANIFEST
OD_CYLINDER: -0.75
OD_SPHERE: +1.00
OS_SPHERE: PLANO
OD_ADD: +2.25
OS_VA1: 20/20
OS_ADD: +2.25
OU_VA: 20/20
OD_VA1: 20/20
OD_AXIS: 085
OS_CYLINDER: SPH

## 2020-12-21 ASSESSMENT — CONFRONTATIONAL VISUAL FIELD TEST (CVF)
OD_FINDINGS: FULL
OS_FINDINGS: FULL

## 2020-12-21 ASSESSMENT — SPHEQUIV_DERIVED
OD_SPHEQUIV: 0.625
OD_SPHEQUIV: 0.125

## 2020-12-21 ASSESSMENT — VISUAL ACUITY
OS_BCVA: 20/20-2
OD_BCVA: 20/125

## 2021-01-11 DIAGNOSIS — I10 ESSENTIAL HYPERTENSION: ICD-10-CM

## 2021-01-12 RX ORDER — HYDROCHLOROTHIAZIDE 12.5 MG/1
12.5 TABLET ORAL DAILY
Qty: 30 TABLET | Refills: 3 | Status: SHIPPED | OUTPATIENT
Start: 2021-01-12 | End: 2021-04-26 | Stop reason: SDUPTHER

## 2021-01-12 RX ORDER — LOSARTAN POTASSIUM 50 MG/1
50 TABLET ORAL DAILY
Qty: 30 TABLET | Refills: 3 | Status: SHIPPED | OUTPATIENT
Start: 2021-01-12 | End: 2021-04-26 | Stop reason: SDUPTHER

## 2021-01-30 DIAGNOSIS — Z23 ENCOUNTER FOR IMMUNIZATION: ICD-10-CM

## 2021-02-06 ENCOUNTER — IMMUNIZATIONS (OUTPATIENT)
Dept: FAMILY MEDICINE CLINIC | Facility: HOSPITAL | Age: 76
End: 2021-02-06

## 2021-02-06 DIAGNOSIS — Z23 ENCOUNTER FOR IMMUNIZATION: Primary | ICD-10-CM

## 2021-02-06 PROCEDURE — 91301 SARS-COV-2 / COVID-19 MRNA VACCINE (MODERNA) 100 MCG: CPT

## 2021-02-06 PROCEDURE — 0011A SARS-COV-2 / COVID-19 MRNA VACCINE (MODERNA) 100 MCG: CPT

## 2021-03-09 ENCOUNTER — IMMUNIZATIONS (OUTPATIENT)
Dept: FAMILY MEDICINE CLINIC | Facility: HOSPITAL | Age: 76
End: 2021-03-09

## 2021-03-09 DIAGNOSIS — Z23 ENCOUNTER FOR IMMUNIZATION: Primary | ICD-10-CM

## 2021-03-09 PROCEDURE — 0012A SARS-COV-2 / COVID-19 MRNA VACCINE (MODERNA) 100 MCG: CPT

## 2021-03-09 PROCEDURE — 91301 SARS-COV-2 / COVID-19 MRNA VACCINE (MODERNA) 100 MCG: CPT

## 2021-04-24 NOTE — PROGRESS NOTES
FAMILY PRACTICE OFFICE VISIT  [facility] - [facility]       NAME: Kacie Roe  AGE: 77 y o  SEX: female       : 1945        MRN: [id number]    DATE: 2021  TIME: 2:47 PM    Assessment and Plan     Problem List Items Addressed This Visit        Endocrine    Type 2 diabetes mellitus without complication, without long-term current use of insulin ([facility] [address] )       We reviewed her A1c increased from 6 2% in October to 6 9% today  This is likely the result her dramatic decrease in activity and increase carbohydrate intake over the months since she fell and broke her ribs  She will work on improving her choices continuing to increase her activity level with walking  She will also work decreasing her carbohydrate intake/stopping sweets  She was asked to return in 3 months for recheck her blood sugar  She was given labs to about week prior to visit  She should with any problems or concerns in the interim  She declined referral to Diabetes Education  Hold off on starting any medication at this time  Lab Results   Component Value Date    HGBA1C 6 9 (A) 2021            Relevant Orders    CBC and differential    Comprehensive metabolic panel    Hemoglobin A1C    Lipid Panel with Direct LDL reflex    TSH, 3rd generation with Free T4 reflex    Microalbumin / creatinine urine ratio       Cardiovascular and Mediastinum    Essential hypertension      Controlled  Continue hydrochlorothiazide 12 5 mg daily and losartan 50 mg daily  Will continue to monitor           Relevant Medications    losartan (COZAAR) 50 mg tablet    hydrochlorothiazide (HYDRODIURIL) 12 5 mg tablet    potassium chloride (K-DUR,KLOR-CON) 10 mEq tablet    Other Relevant Orders    CBC and differential    Comprehensive metabolic panel    Hemoglobin A1C    Lipid Panel with Direct LDL reflex    TSH, 3rd generation with Free T4 reflex    Microalbumin / creatinine urine ratio       Musculoskeletal and Integument    Osteoporosis     Patient will continue to follow with Dr Kennedy Kam  She is getting Prolia every 6 months  She had last DEXA scan in October 2020  Other    Obesity (BMI 30 0-34  9)     BMI Counseling: Body mass index is 31 03 kg/m²  The BMI is above normal  Nutrition recommendations include decreasing overall calorie intake, 3-5 servings of fruits/vegetables daily and moderation in carbohydrate intake  Exercise recommendations include moderate aerobic physical activity for 150 minutes/week and exercising 3-5 times per week  Hx of colonic polyps      Patient is due for next colonoscopy in June 2021  She was referred to Gastroenterology since her previous specialist retired  Relevant Orders    Ambulatory referral to Gastroenterology    Platelets decreased ([facility] )     Recheck with labs as ordered  Relevant Orders    CBC and differential      Other Visit Diagnoses     Medicare annual wellness visit, subsequent    -  Primary    Prediabetes        Relevant Orders    POCT hemoglobin A1c (Completed)          Type 2 diabetes mellitus without complication, without long-term current use of insulin ([facility] [address] )    We reviewed her A1c increased from 6 2% in October to 6 9% today  This is likely the result her dramatic decrease in activity and increase carbohydrate intake over the months since she fell and broke her ribs  She will work on improving her choices continuing to increase her activity level with walking  She will also work decreasing her carbohydrate intake/stopping sweets  She was asked to return in 3 months for recheck her blood sugar  She was given labs to about week prior to visit  She should with any problems or concerns in the interim  She declined referral to Diabetes Education  Hold off on starting any medication at this time  Lab Results   Component Value Date    HGBA1C 6 9 (A) 04/26/2021       Essential hypertension   Controlled    Continue hydrochlorothiazide 12 5 mg daily and losartan 50 mg daily  Will continue to monitor  Osteoporosis  Patient will continue to follow with Dr Zayra Newman  She is getting Prolia every 6 months  She had last DEXA scan in October 2020  Hx of colonic polyps   Patient is due for next colonoscopy in June 2021  She was referred to Gastroenterology since her previous specialist retired  Obesity (BMI 30 0-34  9)  BMI Counseling: Body mass index is 31 03 kg/m²  The BMI is above normal  Nutrition recommendations include decreasing overall calorie intake, 3-5 servings of fruits/vegetables daily and moderation in carbohydrate intake  Exercise recommendations include moderate aerobic physical activity for 150 minutes/week and exercising 3-5 times per week  Platelets decreased ([address] )  Recheck with labs as ordered  Chief Complaint     Chief Complaint   Patient presents with    Medicare Wellness Visit       History of Present Illness   Cm Estrella is a 77y o -year-old female who presents for AWV and follow-up on chronic medical problems  Patient has a history of impaired fasting blood sugar  Her most recent A1c was 6 2% in October 2020  She reports increased intake of carbohydrates  Her A1c today is 6 9%  She has known osteoporosis which she follows with rheumatology for  She is getting Prolia injections every 6 months  Her most recent DEXA scan was in 10/2020  Since the patent's last visit, weight has increased about 10 pounds  Diet is reported to be poor - notes increased carb intake and caloric intake in general  Exercise has restarted with daily walks - walks about 2 miles a day  The patient reports that current blood pressure medications include losartan 50 mg daily and hydrochlorothiazide 12 5 mg daily  Blood pressure readings at home are approximately 120/60s    The patient denies symptoms of poor control such as chest pain, shortness of breath, leg swelling, vision changes, headaches, or dizziness  Patient notes that she tripped over her dog and notes that she broke 3 ribs on 12/26/2020  She notes that it took about 2-3 months for her to get back to usual activities  She notes that she used to walk 3 miles a day and had to start with 1 block - now up to 2 miles a day  Review of Systems   Review of Systems   Constitutional: Negative for chills and fever  HENT: Negative for rhinorrhea and sore throat  Eyes: Negative for visual disturbance  Respiratory: Negative for cough, shortness of breath and wheezing  Cardiovascular: Positive for chest pain (from healing broken ribs)  Negative for palpitations and leg swelling  Gastrointestinal: Negative for abdominal pain, constipation, diarrhea, nausea and vomiting  Endocrine: Negative for polydipsia and polyuria  Genitourinary: Negative for dysuria and frequency  Musculoskeletal: Negative for arthralgias and myalgias  Skin: Negative for rash  Neurological: Negative for dizziness, syncope and headaches  Hematological: Does not bruise/bleed easily  Psychiatric/Behavioral: Negative for dysphoric mood  The patient is not nervous/anxious  Active Problem List     Patient Active Problem List   Diagnosis    Dietary calcium deficiency    Obesity (BMI 30 0-34  9)    Osteoporosis    Hx of colonic polyps    Heart murmur    Fall    Type 2 diabetes mellitus without complication, without long-term current use of insulin (HCC)    Platelets decreased ([address] )    Essential hypertension         Past Medical History:  Past Medical History:   Diagnosis Date    Chicken pox     Kidney stones     Malignant neoplasm of skin     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       Past Surgical History:  Past Surgical History:   Procedure Laterality Date    COLONOSCOPY  2015    COMPLETE; DUE 3 YRS    COLONOSCOPY  2018    DILATION AND CURETTAGE OF UTERUS  08/2013    THICKENED ENDO ON USG, CVX STENOSIS, PATH: BENIGN POLYP FRAGMENTS AND INACTIVE ENDOMETRIUM  NO NEOPLASIA ; IMPRESSION: 10/27/14; [facility]    HYSTEROSCOPY      MOHS SURGERY  2020    BCC on face    TONSILLECTOMY         Family History:  Family History   Problem Relation Age of Onset   Jose Luis Salinass Kidney cancer Mother 79        RENAL CANCER    Hypertension Mother     Skin cancer Mother     Osteoporosis Mother     Osteoporotic fracture Mother         hip - age 80     Osteoporotic fracture Paternal Grandmother        Social History:  Social History     Socioeconomic History    Marital status: Single     Spouse name: Not on file    Number of children: Not on file    Years of education: Not on file    Highest education level: Not on file   Occupational History    Occupation: [employer]   Social Needs    Financial resource strain: Not on file    Food insecurity     Worry: Not on file     Inability: Not on file    Transportation needs     Medical: Not on file     Non-medical: Not on file   Tobacco Use    Smoking status: Former Smoker     Packs/day: 0 50     Years: 5 00     Pack years: 2 50     Quit date: 1970     Years since quittin 3    Smokeless tobacco: Never Used    Tobacco comment: STARTED AT AGE 18   STOPPED AT AGE 23     Substance and Sexual Activity    Alcohol use: Yes     Frequency: Monthly or less     Comment: Maybe 5x per year    Drug use: Never    Sexual activity: Not on file   Lifestyle    Physical activity     Days per week: Not on file     Minutes per session: Not on file    Stress: Not on file   Relationships    Social connections     Talks on phone: Not on file     Gets together: Not on file     Attends Oriental orthodox service: Not on file     Active member of club or organization: Not on file     Attends meetings of clubs or organizations: Not on file     Relationship status: Not on file    Intimate partner violence     Fear of current or ex partner: Not on file     Emotionally abused: Not on file     Physically abused: Not on file Forced sexual activity: Not on file   Other Topics Concern    Not on file   Social History Narrative    EXERCISES MODERATELY LESS THAN 3 TIMES WEEK       Objective     Vitals:    04/26/21 1315 04/26/21 1412   BP: 138/65 134/68   BP Location: Left arm    Patient Position: Sitting    Cuff Size: Adult    Pulse: 82    Temp: 98 °F (36 7 °C)    TempSrc: Temporal    SpO2: 98%    Weight: 74 5 kg (164 lb 3 2 oz)    Height: 5' 1" (1 549 m)      Wt Readings from Last 3 Encounters:   04/26/21 74 5 kg (164 lb 3 2 oz)   04/23/20 69 9 kg (154 lb)   11/19/19 71 1 kg (156 lb 12 8 oz)       Physical Exam  Vitals signs reviewed  Constitutional:       General: She is not in acute distress  Appearance: Normal appearance  She is well-developed  She is obese  She is not ill-appearing  HENT:      Head: Normocephalic and atraumatic  Right Ear: Hearing, tympanic membrane, ear canal and external ear normal       Left Ear: Hearing, tympanic membrane, ear canal and external ear normal  There is impacted cerumen (removed with plastic loop easily )  Eyes:      General: Lids are normal       Conjunctiva/sclera: Conjunctivae normal       Pupils: Pupils are equal, round, and reactive to light  Neck:      Musculoskeletal: Normal range of motion and neck supple  Thyroid: No thyroid mass or thyromegaly  Vascular: No carotid bruit  Trachea: Trachea normal    Cardiovascular:      Rate and Rhythm: Normal rate and regular rhythm  Pulses: Normal pulses  Radial pulses are 2+ on the right side and 2+ on the left side  Posterior tibial pulses are 2+ on the right side and 2+ on the left side  Heart sounds: Normal heart sounds, S1 normal and S2 normal  No murmur  Pulmonary:      Effort: Pulmonary effort is normal       Breath sounds: Normal breath sounds  No decreased breath sounds, wheezing, rhonchi or rales  Abdominal:      General: Bowel sounds are normal  There is no distension        Palpations: Abdomen is soft  There is no mass  Tenderness: There is no abdominal tenderness  Hernia: No hernia is present  Musculoskeletal: Normal range of motion  Right lower leg: Edema (trace) present  Left lower leg: Edema (trace) present  Lymphadenopathy:      Cervical: No cervical adenopathy  Skin:     General: Skin is warm and dry  Findings: No rash  Neurological:      Mental Status: She is alert  Sensory: No sensory deficit (light touch sensation intact and equal in UE and LE bilaterally)  Psychiatric:         Mood and Affect: Mood normal          Behavior: Behavior normal          Thought Content:  Thought content normal          Judgment: Judgment normal          Pertinent Laboratory/Diagnostic Studies:  Lab Results   Component Value Date    BUN 13 12/10/2020    CREATININE 0 82 12/10/2020    CALCIUM 10 4 12/10/2020    K 3 8 12/10/2020    CO2 27 12/10/2020     12/10/2020     Lab Results   Component Value Date    ALT 22 12/10/2020    AST 40 (H) 12/10/2020    ALKPHOS 89 12/10/2020       Lab Results   Component Value Date    WBC 4 7 12/10/2020    HGB 12 4 12/10/2020    HCT 36 6 12/10/2020     3 (H) 12/10/2020     (L) 12/10/2020       Lab Results   Component Value Date    TRIG 64 06/09/2020     Lab Results   Component Value Date    HDL 64 06/09/2020     Lab Results   Component Value Date    LDLCALC 95 06/09/2020     Lab Results   Component Value Date    HGBA1C 6 9 (A) 04/26/2021       Results for orders placed or performed in visit on 04/26/21   POCT hemoglobin A1c   Result Value Ref Range    Hemoglobin A1C 6 9 (A) 6 5         ALLERGIES:  No Known Allergies    Current Medications     Current Outpatient Medications   Medication Sig Dispense Refill    Calcium Carbonate-Vitamin D (CALCIUM 600+D) 600-200 MG-UNIT TABS Take 1 tablet by mouth 2 (two) times a day      Cholecalciferol (VITAMIN D3) 1000 units CAPS Take by mouth daily        denosumab (PROLIA) 60 mg/mL Inject under the skin every 6 (six) months        hydrochlorothiazide (HYDRODIURIL) 12 5 mg tablet Take 1 tablet (12 5 mg total) by mouth daily 30 tablet 3    losartan (COZAAR) 50 mg tablet Take 1 tablet (50 mg total) by mouth daily 30 tablet 3    Multiple Vitamins-Minerals (DAILY MULTI PO) Take by mouth daily        potassium chloride (K-DUR,KLOR-CON) 10 mEq tablet Take 1 tablet (10 mEq total) by mouth daily 30 tablet 5     No current facility-administered medications for this visit            Health Maintenance     Health Maintenance   Topic Date Due    Diabetic Foot Exam  Never done    DM Eye Exam  Never done    PT PLAN OF CARE  05/17/2018    Influenza Vaccine (1) 09/01/2020    BMI: Followup Plan  04/23/2021    Colonoscopy Surveillance  06/20/2021    HEMOGLOBIN A1C  10/26/2021    Fall Risk  04/26/2022    Depression Screening PHQ  04/26/2022    Medicare Annual Wellness Visit (AWV)  04/26/2022    BMI: Adult  04/26/2022    Colorectal Cancer Screening  06/20/2028    DTaP,Tdap,and Td Vaccines (2 - Td) 04/30/2029    Hepatitis C Screening  Completed    Pneumococcal Vaccine: 65+ Years  Completed    COVID-19 Vaccine  Completed    HIB Vaccine  Aged Out    Hepatitis B Vaccine  Aged Out    IPV Vaccine  Aged Out    Hepatitis A Vaccine  Aged Out    Meningococcal ACWY Vaccine  Aged Out    HPV Vaccine  Aged [employer] History   Administered Date(s) Administered    INFLUENZA 10/24/2007, 10/23/2017    Influenza Split High Dose Preservative Free IM 10/24/2018    Influenza, high dose seasonal 0 7 mL 09/26/2019    Influenza, seasonal, injectable 10/24/2007    Pneumococcal Conjugate 13-Valent 10/23/2017    Pneumococcal Polysaccharide PPV23 10/24/2018    SARS-CoV-2 / COVID-19 mRNA IM (Tiesha Rasmussen) 02/06/2021, 03/09/2021    Tdap 04/30/2019    Zoster 06/17/2016       Dm Parra PA-C  4/26/2021 2:47 PM  [facility]

## 2021-04-26 ENCOUNTER — OFFICE VISIT (OUTPATIENT)
Dept: FAMILY MEDICINE CLINIC | Facility: CLINIC | Age: 76
End: 2021-04-26
Payer: MEDICARE

## 2021-04-26 VITALS
SYSTOLIC BLOOD PRESSURE: 134 MMHG | TEMPERATURE: 98 F | HEART RATE: 82 BPM | OXYGEN SATURATION: 98 % | DIASTOLIC BLOOD PRESSURE: 68 MMHG | HEIGHT: 61 IN | BODY MASS INDEX: 31 KG/M2 | WEIGHT: 164.2 LBS

## 2021-04-26 DIAGNOSIS — Z00.00 MEDICARE ANNUAL WELLNESS VISIT, SUBSEQUENT: Primary | ICD-10-CM

## 2021-04-26 DIAGNOSIS — Z86.010 HX OF COLONIC POLYPS: ICD-10-CM

## 2021-04-26 DIAGNOSIS — M81.0 OSTEOPOROSIS, UNSPECIFIED OSTEOPOROSIS TYPE, UNSPECIFIED PATHOLOGICAL FRACTURE PRESENCE: ICD-10-CM

## 2021-04-26 DIAGNOSIS — E66.9 OBESITY (BMI 30.0-34.9): ICD-10-CM

## 2021-04-26 DIAGNOSIS — E11.9 TYPE 2 DIABETES MELLITUS WITHOUT COMPLICATION, WITHOUT LONG-TERM CURRENT USE OF INSULIN (HCC): ICD-10-CM

## 2021-04-26 DIAGNOSIS — R73.03 PREDIABETES: ICD-10-CM

## 2021-04-26 DIAGNOSIS — I10 ESSENTIAL HYPERTENSION: ICD-10-CM

## 2021-04-26 DIAGNOSIS — D69.6 PLATELETS DECREASED (HCC): ICD-10-CM

## 2021-04-26 LAB — SL AMB POCT HEMOGLOBIN AIC: 6.9 (ref ?–6.5)

## 2021-04-26 PROCEDURE — 99214 OFFICE O/P EST MOD 30 MIN: CPT | Performed by: PHYSICIAN ASSISTANT

## 2021-04-26 PROCEDURE — G0439 PPPS, SUBSEQ VISIT: HCPCS | Performed by: PHYSICIAN ASSISTANT

## 2021-04-26 PROCEDURE — 83036 HEMOGLOBIN GLYCOSYLATED A1C: CPT | Performed by: PHYSICIAN ASSISTANT

## 2021-04-26 PROCEDURE — 1123F ACP DISCUSS/DSCN MKR DOCD: CPT | Performed by: PHYSICIAN ASSISTANT

## 2021-04-26 RX ORDER — HYDROCHLOROTHIAZIDE 12.5 MG/1
12.5 TABLET ORAL DAILY
Qty: 30 TABLET | Refills: 3 | Status: SHIPPED | OUTPATIENT
Start: 2021-04-26 | End: 2021-08-02 | Stop reason: SDUPTHER

## 2021-04-26 RX ORDER — HYDROCODONE BITARTRATE AND ACETAMINOPHEN 5; 325 MG/1; MG/1
.5-1 TABLET ORAL EVERY 8 HOURS PRN
COMMUNITY
Start: 2020-12-27 | End: 2021-04-26

## 2021-04-26 RX ORDER — LOSARTAN POTASSIUM 50 MG/1
50 TABLET ORAL DAILY
Qty: 30 TABLET | Refills: 3 | Status: SHIPPED | OUTPATIENT
Start: 2021-04-26 | End: 2021-09-06 | Stop reason: SDUPTHER

## 2021-04-26 RX ORDER — POTASSIUM CHLORIDE 750 MG/1
10 TABLET, EXTENDED RELEASE ORAL DAILY
Qty: 30 TABLET | Refills: 5 | Status: SHIPPED | OUTPATIENT
Start: 2021-04-26 | End: 2021-10-06 | Stop reason: SDUPTHER

## 2021-04-26 NOTE — ASSESSMENT & PLAN NOTE
BMI Counseling: Body mass index is 31 03 kg/m²  The BMI is above normal  Nutrition recommendations include decreasing overall calorie intake, 3-5 servings of fruits/vegetables daily and moderation in carbohydrate intake  Exercise recommendations include moderate aerobic physical activity for 150 minutes/week and exercising 3-5 times per week

## 2021-04-26 NOTE — ASSESSMENT & PLAN NOTE
Patient will continue to follow with Dr Kiersten Louis  She is getting Prolia every 6 months  She had last DEXA scan in October 2020

## 2021-04-26 NOTE — ASSESSMENT & PLAN NOTE
Controlled  Continue hydrochlorothiazide 12 5 mg daily and losartan 50 mg daily  Will continue to monitor

## 2021-04-26 NOTE — PROGRESS NOTES
Assessment and Plan:     Problem List Items Addressed This Visit        Endocrine    Type 2 diabetes mellitus without complication, without long-term current use of insulin ([facility] [address] )       We reviewed her A1c increased from 6 2% in October to 6 9% today  This is likely the result her dramatic decrease in activity and increase carbohydrate intake over the months since she fell and broke her ribs  She will work on improving her choices continuing to increase her activity level with walking  She will also work decreasing her carbohydrate intake/stopping sweets  She was asked to return in 3 months for recheck her blood sugar  She was given labs to about week prior to visit  She should with any problems or concerns in the interim  She declined referral to Diabetes Education  Hold off on starting any medication at this time  Lab Results   Component Value Date    HGBA1C 6 9 (A) 04/26/2021            Relevant Orders    CBC and differential    Comprehensive metabolic panel    Hemoglobin A1C    Lipid Panel with Direct LDL reflex    TSH, 3rd generation with Free T4 reflex    Microalbumin / creatinine urine ratio       Cardiovascular and Mediastinum    Essential hypertension      Controlled  Continue hydrochlorothiazide 12 5 mg daily and losartan 50 mg daily  Will continue to monitor  Relevant Medications    losartan (COZAAR) 50 mg tablet    hydrochlorothiazide (HYDRODIURIL) 12 5 mg tablet    potassium chloride (K-DUR,KLOR-CON) 10 mEq tablet    Other Relevant Orders    CBC and differential    Comprehensive metabolic panel    Hemoglobin A1C    Lipid Panel with Direct LDL reflex    TSH, 3rd generation with Free T4 reflex    Microalbumin / creatinine urine ratio       Musculoskeletal and Integument    Osteoporosis     Patient will continue to follow with Dr Jessie Boss  She is getting Prolia every 6 months  She had last DEXA scan in October 2020  Other    Obesity (BMI 30 0-34  9)     BMI Counseling:  Body mass index is 31 03 kg/m²  The BMI is above normal  Nutrition recommendations include decreasing overall calorie intake, 3-5 servings of fruits/vegetables daily and moderation in carbohydrate intake  Exercise recommendations include moderate aerobic physical activity for 150 minutes/week and exercising 3-5 times per week  Hx of colonic polyps      Patient is due for next colonoscopy in June 2021  She was referred to Gastroenterology since her previous specialist retired  Relevant Orders    Ambulatory referral to Gastroenterology    Platelets decreased ([address] )     Recheck with labs as ordered  Relevant Orders    CBC and differential      Other Visit Diagnoses     Medicare annual wellness visit, subsequent    -  Primary    Prediabetes        Relevant Orders    POCT hemoglobin A1c (Completed)           Preventive health issues were discussed with patient, and age appropriate screening tests were ordered as noted in patient's After Visit Summary  Personalized health advice and appropriate referrals for health education or preventive services given if needed, as noted in patient's After Visit Summary  History of Present Illness:     Patient presents for Medicare Annual Wellness visit    Patient Care Team:  Zarina Arboleda PA-C as PCP - General (Family Medicine)  MD Mariia Thrasher MD     Problem List:     Patient Active Problem List   Diagnosis    Dietary calcium deficiency    Obesity (BMI 30 0-34  9)    Osteoporosis    Hx of colonic polyps    Heart murmur    Fall    Type 2 diabetes mellitus without complication, without long-term current use of insulin ([address] )    Platelets decreased ([address] )    Essential hypertension      Past Medical and Surgical History:     Past Medical History:   Diagnosis Date    Chicken pox     Kidney stones     Malignant neoplasm of skin     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     Past Surgical History:   Procedure Laterality Date    COLONOSCOPY  2015 COMPLETE; DUE 3 YRS    COLONOSCOPY  2018    DILATION AND CURETTAGE OF UTERUS  2013    THICKENED ENDO ON USG, CVX STENOSIS, PATH: BENIGN POLYP FRAGMENTS AND INACTIVE ENDOMETRIUM  NO NEOPLASIA ; IMPRESSION: 10/27/14; [facility]    HYSTEROSCOPY      MOHS SURGERY  2020    BCC on face    TONSILLECTOMY        Family History:     Family History   Problem Relation Age of Onset   Fred Rice Kidney cancer Mother 79        RENAL CANCER    Hypertension Mother     Skin cancer Mother     Osteoporosis Mother     Osteoporotic fracture Mother         hip - age 80     Osteoporotic fracture Paternal Grandmother       Social History:        Social History     Socioeconomic History    Marital status: Single     Spouse name: None    Number of children: None    Years of education: None    Highest education level: None   Occupational History    Occupation: [employer]   Social Needs    Financial resource strain: None    Food insecurity     Worry: None     Inability: None    Transportation needs     Medical: None     Non-medical: None   Tobacco Use    Smoking status: Former Smoker     Packs/day: 0 50     Years: 5 00     Pack years: 2 50     Quit date: 1970     Years since quittin 3    Smokeless tobacco: Never Used    Tobacco comment: STARTED AT AGE 17   STOPPED AT AGE 23     Substance and Sexual Activity    Alcohol use: Yes     Frequency: Monthly or less     Comment: Maybe 5x per year    Drug use: Never    Sexual activity: None   Lifestyle    Physical activity     Days per week: None     Minutes per session: None    Stress: None   Relationships    Social connections     Talks on phone: None     Gets together: None     Attends [employer] service: None     Active member of club or organization: None     Attends meetings of clubs or organizations: None     Relationship status: None    Intimate partner violence     Fear of current or ex partner: None     Emotionally abused: None     Physically abused: None     Forced sexual activity: None   Other Topics Concern    None   Social History Narrative    EXERCISES MODERATELY LESS THAN 3 TIMES WEEK      Medications and Allergies:     Current Outpatient Medications   Medication Sig Dispense Refill    Calcium Carbonate-Vitamin D (CALCIUM 600+D) 600-200 MG-UNIT TABS Take 1 tablet by mouth 2 (two) times a day      Cholecalciferol (VITAMIN D3) 1000 units CAPS Take by mouth daily        denosumab (PROLIA) 60 mg/mL Inject under the skin every 6 (six) months        hydrochlorothiazide (HYDRODIURIL) 12 5 mg tablet Take 1 tablet (12 5 mg total) by mouth daily 30 tablet 3    losartan (COZAAR) 50 mg tablet Take 1 tablet (50 mg total) by mouth daily 30 tablet 3    Multiple Vitamins-Minerals (DAILY MULTI PO) Take by mouth daily        potassium chloride (K-DUR,KLOR-CON) 10 mEq tablet Take 1 tablet (10 mEq total) by mouth daily 30 tablet 5     No current facility-administered medications for this visit  No Known Allergies   Immunizations:     Immunization History   Administered Date(s) Administered    INFLUENZA 10/24/2007, 10/23/2017    Influenza Split High Dose Preservative Free IM 10/24/2018    Influenza, high dose seasonal 0 7 mL 09/26/2019    Influenza, seasonal, injectable 10/24/2007    Pneumococcal Conjugate 13-Valent 10/23/2017    Pneumococcal Polysaccharide PPV23 10/24/2018    SARS-CoV-2 / COVID-19 mRNA IM (Moderna) 02/06/2021, 03/09/2021    Tdap 04/30/2019    Zoster 06/17/2016      Health Maintenance:         Topic Date Due    Colonoscopy Surveillance  06/20/2021    Colorectal Cancer Screening  06/20/2028    Hepatitis C Screening  Completed         Topic Date Due    Influenza Vaccine (1) 09/01/2020      Medicare Health Risk Assessment:     /68   Pulse 82   Temp 98 °F (36 7 °C) (Temporal)   Ht 5' 1" (1 549 m)   Wt 74 5 kg (164 lb 3 2 oz)   SpO2 98%   BMI 31 03 kg/m²      Fernando Sheth is here for her Subsequent Wellness visit       Health Risk Assessment:   Patient rates overall health as excellent  Patient feels that their physical health rating is much better  Patient is very satisfied with their life  Eyesight was rated as same  Hearing was rated as same  Patient feels that their emotional and mental health rating is same  Patients states they are never, rarely angry  Patient states they are never, rarely unusually tired/fatigued  Pain experienced in the last 7 days has been some  Patient's pain rating has been 2/10  Patient states that she has experienced weight loss or gain in last 6 months  Pt gain at least 10 pounds    Depression Screening:   PHQ-2 Score: 0      Fall Risk Screening: In the past year, patient has experienced: history of falling in past year    Number of falls: 1  Injured during fall?: Yes    Feels unsteady when standing or walking?: No    Worried about falling?: Yes      Urinary Incontinence Screening:   Patient has not leaked urine accidently in the last six months  Home Safety:  Patient does not have trouble with stairs inside or outside of their home  Patient has working smoke alarms and has working carbon monoxide detector  Home safety hazards include: none  Nutrition:   Current diet is Regular  Medications:   Patient is currently taking over-the-counter supplements  OTC medications include: see medication list  Patient is able to manage medications  Activities of Daily Living (ADLs)/Instrumental Activities of Daily Living (IADLs):   Walk and transfer into and out of bed and chair?: Yes  Dress and groom yourself?: Yes    Bathe or shower yourself?: Yes    Feed yourself? Yes  Do your laundry/housekeeping?: Yes  Manage your money, pay your bills and track your expenses?: Yes  Make your own meals?: Yes    Do your own shopping?: Yes    Previous Hospitalizations:   Any hospitalizations or ED visits within the last 12 months?: No      Advance Care Planning:   Living will: Yes    Advanced directive:  Yes Cognitive Screening:   Provider or family/friend/caregiver concerned regarding cognition?: No    PREVENTIVE SCREENINGS      Cardiovascular Screening:    General: Screening Current      Diabetes Screening:     General: Screening Current      Colorectal Cancer Screening:     General: Screening Current      Breast Cancer Screening:     General: Screening Current      Cervical Cancer Screening:    General: Screening Not Indicated      Osteoporosis Screening:    General: Screening Not Indicated and History Osteoporosis      Abdominal Aortic Aneurysm (AAA) Screening:        General: Screening Not Indicated      Lung Cancer Screening:     General: Screening Not Indicated      Hepatitis C Screening:    General: Screening Current    Screening, Brief Intervention, and Referral to Treatment (SBIRT)    Screening  Typical number of drinks in a day: 0  Typical number of drinks in a week: 0  Interpretation: Low risk drinking behavior      Single Item Drug Screening:  How often have you used an illegal drug (including marijuana) or a prescription medication for non-medical reasons in the past year? never    Single Item Drug Screen Score: 0  Interpretation: Negative screen for possible drug use disorder      Lainey Damian PA-C

## 2021-04-26 NOTE — ASSESSMENT & PLAN NOTE
Patient is due for next colonoscopy in June 2021  She was referred to Gastroenterology since her previous specialist retired

## 2021-04-26 NOTE — ASSESSMENT & PLAN NOTE
We reviewed her A1c increased from 6 2% in October to 6 9% today  This is likely the result her dramatic decrease in activity and increase carbohydrate intake over the months since she fell and broke her ribs  She will work on improving her choices continuing to increase her activity level with walking  She will also work decreasing her carbohydrate intake/stopping sweets  She was asked to return in 3 months for recheck her blood sugar  She was given labs to about week prior to visit  She should with any problems or concerns in the interim  She declined referral to Diabetes Education  Hold off on starting any medication at this time     Lab Results   Component Value Date    HGBA1C 6 9 (A) 04/26/2021

## 2021-06-02 ENCOUNTER — CONSULT (OUTPATIENT)
Dept: GASTROENTEROLOGY | Facility: MEDICAL CENTER | Age: 76
End: 2021-06-02
Payer: MEDICARE

## 2021-06-02 VITALS
HEIGHT: 61 IN | TEMPERATURE: 97.6 F | BODY MASS INDEX: 30.21 KG/M2 | WEIGHT: 160 LBS | DIASTOLIC BLOOD PRESSURE: 80 MMHG | SYSTOLIC BLOOD PRESSURE: 130 MMHG | HEART RATE: 78 BPM

## 2021-06-02 DIAGNOSIS — Z86.010 HX OF COLONIC POLYPS: ICD-10-CM

## 2021-06-02 PROCEDURE — 99202 OFFICE O/P NEW SF 15 MIN: CPT | Performed by: PHYSICIAN ASSISTANT

## 2021-06-02 NOTE — PATIENT INSTRUCTIONS
The patient is scheduled at [facility] for a colon with Dr Kruse on 7/20/2021  Miralax/dulcolax prep instructions have been gone over in the office, with the patient, by the MA  The patient is aware that they will receive a call with the arrival time the day prior to procedure and that they will need a  the day of the procedure   I have asked the patient to call with any questions that they might have prior to procedure

## 2021-06-02 NOTE — PROGRESS NOTES
[facility] - Outpatient Consultation  Azael Jacobsen 77 y o  female MRN: [id number]  Encounter: [id number]          ASSESSMENT AND PLAN:      1  Hx of colonic polyps: she is here for her 3 year repeat colonoscopy due to tubular adenoma  Her previous GI retired and she was referred to Jac[facility] Castillo  She denies family hx of colon cancer and denies GI complaints at this time including weight loss, change in bowel habits, melena or hematochezia  If normal, will not likely require further screening given her age  - Ambulatory referral to Gastroenterology  - Colonoscopy; Future  -miralax/dulcolax  -f/u after procedure   -  Risks and benefits of procedure were discussed including but not limited to bleeding, infection, perforation  She understands and agrees to proceed with procedure    ______________________________________________________________________    HPI:  Марина Castro   Is a 18-year-old female who is here for colon cancer screening purposes  She states that she was on a 3 year recall protocol with her previous GI  Her gastroenterologist retired and she was referred to [facility]  She is due this month for colonoscopy  She denies a family history of colon cancer  She denies any symptoms at this time including change in appetite, early satiety, dysphagia, acid reflux, abdominal pain, change in bowel habits, melena or hematochezia  Her last colonoscopy was in 2018 and pathology revealed tubular adenoma  REVIEW OF SYSTEMS:    CONSTITUTIONAL: Denies any fever, chills, rigors, and weight loss  HEENT: No earache or tinnitus  Denies hearing loss or visual disturbances  CARDIOVASCULAR: No chest pain or palpitations  RESPIRATORY: Denies any cough, hemoptysis, shortness of breath or dyspnea on exertion  GASTROINTESTINAL: As noted in the History of Present Illness  GENITOURINARY: No problems with urination  Denies any hematuria or dysuria    NEUROLOGIC: No dizziness or vertigo, denies headaches  MUSCULOSKELETAL: Denies any muscle or joint pain  SKIN: Denies skin rashes or itching  ENDOCRINE: Denies excessive thirst  Denies intolerance to heat or cold  PSYCHOSOCIAL: Denies depression or anxiety  Denies any recent memory loss  Historical Information   Past Medical History:   Diagnosis Date    Chicken pox     Kidney stones     Malignant neoplasm of skin     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     Past Surgical History:   Procedure Laterality Date    COLONOSCOPY      COLONOSCOPY      COMPLETE; DUE 3 YRS    COLONOSCOPY  2018    DILATION AND CURETTAGE OF UTERUS  2013    THICKENED ENDO ON USG, CVX STENOSIS, PATH: BENIGN POLYP FRAGMENTS AND INACTIVE ENDOMETRIUM  NO NEOPLASIA ; IMPRESSION: 10/27/14; [facility]    HYSTEROSCOPY      MOHS SURGERY  2020    BCC on face    TONSILLECTOMY       Social History   Social History     Substance and Sexual Activity   Alcohol Use Yes    Frequency: Monthly or less    Comment: Maybe 5x per year     Social History     Substance and Sexual Activity   Drug Use Never     Social History     Tobacco Use   Smoking Status Former Smoker    Packs/day: 0 50    Years: 5 00    Pack years: 2 50    Quit date: 1970    Years since quittin 4   Smokeless Tobacco Never Used   Tobacco Comment    STARTED AT AGE 17   STOPPED AT AGE 26       Family History   Problem Relation Age of Onset    Kidney cancer Mother 79        RENAL CANCER    Hypertension Mother     Skin cancer Mother     Osteoporosis Mother     Osteoporotic fracture Mother         hip - age 80     Osteoporotic fracture Paternal Grandmother        Meds/Allergies       Current Outpatient Medications:     Calcium Carbonate-Vitamin D (CALCIUM 600+D) 600-200 MG-UNIT TABS    Cholecalciferol (VITAMIN D3) 1000 units CAPS    denosumab (PROLIA) 60 mg/mL    hydrochlorothiazide (HYDRODIURIL) 12 5 mg tablet    losartan (COZAAR) 50 mg tablet    Multiple Vitamins-Minerals (DAILY MULTI PO)    potassium chloride (K-DUR,KLOR-CON) 10 mEq tablet    No Known Allergies        Objective     Blood pressure 130/80, pulse 78, temperature 97 6 °F (36 4 °C), temperature source Tympanic, height 5' 1" (1 549 m), weight 72 6 kg (160 lb)  Body mass index is 30 23 kg/m²  PHYSICAL EXAM:      General Appearance:   Alert, cooperative, no distress   HEENT:   Normocephalic, atraumatic, anicteric      Neck:  Supple, symmetrical, trachea midline   Lungs:   Clear to auscultation bilaterally; no rales, rhonchi or wheezing; respirations unlabored    Heart[de-identified]   Regular rate and rhythm; no murmur, rub, or gallop  Abdomen:   Soft, non-tender, non-distended; normal bowel sounds; no masses, no organomegaly    Genitalia:   Deferred    Rectal:   Deferred    Extremities:  No cyanosis, clubbing or edema    Pulses:  2+ and symmetric    Skin:  No jaundice, rashes, or lesions    Lymph nodes:  No palpable cervical lymphadenopathy        Lab Results:   No visits with results within 1 Day(s) from this visit  Latest known visit with results is:   Office Visit on 04/26/2021   Component Date Value    Hemoglobin A1C 04/26/2021 6 9*         Radiology Results:   No results found

## 2021-07-15 LAB
ALBUMIN SERPL-MCNC: 3.2 G/DL (ref 3.6–5.1)
ALBUMIN/CREAT UR: 16 MCG/MG CREAT
ALBUMIN/GLOB SERPL: 1 (CALC) (ref 1–2.5)
ALP SERPL-CCNC: 64 U/L (ref 37–153)
ALT SERPL-CCNC: 17 U/L (ref 6–29)
AST SERPL-CCNC: 37 U/L (ref 10–35)
BASOPHILS # BLD AUTO: 29 CELLS/UL (ref 0–200)
BASOPHILS NFR BLD AUTO: 0.7 %
BILIRUB SERPL-MCNC: 2.4 MG/DL (ref 0.2–1.2)
BUN SERPL-MCNC: 13 MG/DL (ref 7–25)
BUN/CREAT SERPL: ABNORMAL (CALC) (ref 6–22)
CALCIUM SERPL-MCNC: 9.8 MG/DL (ref 8.6–10.4)
CHLORIDE SERPL-SCNC: 104 MMOL/L (ref 98–110)
CHOLEST SERPL-MCNC: 158 MG/DL
CHOLEST/HDLC SERPL: 2.6 (CALC)
CO2 SERPL-SCNC: 26 MMOL/L (ref 20–32)
CREAT SERPL-MCNC: 0.78 MG/DL (ref 0.6–0.93)
CREAT UR-MCNC: 177 MG/DL (ref 20–275)
EOSINOPHIL # BLD AUTO: 181 CELLS/UL (ref 15–500)
EOSINOPHIL NFR BLD AUTO: 4.3 %
ERYTHROCYTE [DISTWIDTH] IN BLOOD BY AUTOMATED COUNT: 13.2 % (ref 11–15)
GLOBULIN SER CALC-MCNC: 3.3 G/DL (CALC) (ref 1.9–3.7)
GLUCOSE SERPL-MCNC: 95 MG/DL (ref 65–99)
HBA1C MFR BLD: 6 % OF TOTAL HGB
HCT VFR BLD AUTO: 34.8 % (ref 35–45)
HDLC SERPL-MCNC: 60 MG/DL
HGB BLD-MCNC: 12.1 G/DL (ref 11.7–15.5)
LDLC SERPL CALC-MCNC: 83 MG/DL (CALC)
LYMPHOCYTES # BLD AUTO: 1487 CELLS/UL (ref 850–3900)
LYMPHOCYTES NFR BLD AUTO: 35.4 %
MCH RBC QN AUTO: 35 PG (ref 27–33)
MCHC RBC AUTO-ENTMCNC: 34.8 G/DL (ref 32–36)
MCV RBC AUTO: 100.6 FL (ref 80–100)
MICROALBUMIN UR-MCNC: 2.9 MG/DL
MONOCYTES # BLD AUTO: 332 CELLS/UL (ref 200–950)
MONOCYTES NFR BLD AUTO: 7.9 %
NEUTROPHILS # BLD AUTO: 2171 CELLS/UL (ref 1500–7800)
NEUTROPHILS NFR BLD AUTO: 51.7 %
NONHDLC SERPL-MCNC: 98 MG/DL (CALC)
PLATELET # BLD AUTO: 111 THOUSAND/UL (ref 140–400)
PMV BLD REES-ECKER: 10.5 FL (ref 7.5–12.5)
POTASSIUM SERPL-SCNC: 3.5 MMOL/L (ref 3.5–5.3)
PROT SERPL-MCNC: 6.5 G/DL (ref 6.1–8.1)
RBC # BLD AUTO: 3.46 MILLION/UL (ref 3.8–5.1)
SL AMB EGFR AFRICAN AMERICAN: 86 ML/MIN/1.73M2
SL AMB EGFR NON AFRICAN AMERICAN: 74 ML/MIN/1.73M2
SODIUM SERPL-SCNC: 138 MMOL/L (ref 135–146)
TRIGL SERPL-MCNC: 74 MG/DL
TSH SERPL-ACNC: 1.49 MIU/L (ref 0.4–4.5)
WBC # BLD AUTO: 4.2 THOUSAND/UL (ref 3.8–10.8)

## 2021-07-19 ENCOUNTER — TELEPHONE (OUTPATIENT)
Dept: GASTROENTEROLOGY | Facility: MEDICAL CENTER | Age: 76
End: 2021-07-19

## 2021-07-20 ENCOUNTER — HOSPITAL ENCOUNTER (OUTPATIENT)
Dept: GASTROENTEROLOGY | Facility: MEDICAL CENTER | Age: 76
Setting detail: OUTPATIENT SURGERY
Discharge: HOME | End: 2021-07-20
Admitting: PHYSICIAN ASSISTANT
Payer: MEDICARE

## 2021-07-20 ENCOUNTER — ANESTHESIA EVENT (OUTPATIENT)
Dept: GASTROENTEROLOGY | Facility: MEDICAL CENTER | Age: 76
End: 2021-07-20

## 2021-07-20 ENCOUNTER — ANESTHESIA (OUTPATIENT)
Dept: GASTROENTEROLOGY | Facility: MEDICAL CENTER | Age: 76
End: 2021-07-20

## 2021-07-20 VITALS
TEMPERATURE: 98.3 F | BODY MASS INDEX: 30.21 KG/M2 | RESPIRATION RATE: 16 BRPM | HEIGHT: 61 IN | SYSTOLIC BLOOD PRESSURE: 119 MMHG | OXYGEN SATURATION: 98 % | WEIGHT: 160 LBS | DIASTOLIC BLOOD PRESSURE: 58 MMHG | HEART RATE: 69 BPM

## 2021-07-20 DIAGNOSIS — Z86.010 HX OF COLONIC POLYPS: ICD-10-CM

## 2021-07-20 PROCEDURE — 88305 TISSUE EXAM BY PATHOLOGIST: CPT | Performed by: PATHOLOGY

## 2021-07-20 PROCEDURE — 45385 COLONOSCOPY W/LESION REMOVAL: CPT | Performed by: INTERNAL MEDICINE

## 2021-07-20 RX ORDER — PROPOFOL 10 MG/ML
INJECTION, EMULSION INTRAVENOUS AS NEEDED
Status: DISCONTINUED | OUTPATIENT
Start: 2021-07-20 | End: 2021-07-20

## 2021-07-20 RX ORDER — SODIUM CHLORIDE 9 MG/ML
125 INJECTION, SOLUTION INTRAVENOUS CONTINUOUS
Status: DISCONTINUED | OUTPATIENT
Start: 2021-07-20 | End: 2021-07-24 | Stop reason: HOSPADM

## 2021-07-20 RX ADMIN — PROPOFOL 50 MG: 10 INJECTION, EMULSION INTRAVENOUS at 13:56

## 2021-07-20 RX ADMIN — PROPOFOL 100 MG: 10 INJECTION, EMULSION INTRAVENOUS at 13:46

## 2021-07-20 RX ADMIN — PROPOFOL 50 MG: 10 INJECTION, EMULSION INTRAVENOUS at 13:59

## 2021-07-20 RX ADMIN — PROPOFOL 50 MG: 10 INJECTION, EMULSION INTRAVENOUS at 14:07

## 2021-07-20 RX ADMIN — SODIUM CHLORIDE 125 ML/HR: 0.9 INJECTION, SOLUTION INTRAVENOUS at 13:22

## 2021-07-20 RX ADMIN — PROPOFOL 50 MG: 10 INJECTION, EMULSION INTRAVENOUS at 13:48

## 2021-07-20 RX ADMIN — PROPOFOL 50 MG: 10 INJECTION, EMULSION INTRAVENOUS at 14:03

## 2021-07-20 RX ADMIN — PROPOFOL 50 MG: 10 INJECTION, EMULSION INTRAVENOUS at 13:50

## 2021-07-20 RX ADMIN — PROPOFOL 50 MG: 10 INJECTION, EMULSION INTRAVENOUS at 13:52

## 2021-07-20 NOTE — DISCHARGE INSTRUCTIONS
Hemostasis clips applied to ploypectomy sites to prevent bleeding  It is recommended that you avoid any MRI studies for 30 days  Colorectal Polyps   WHAT YOU NEED TO KNOW:   Colorectal polyps are small growths of tissue in the lining of the colon and rectum  Most polyps are hyperplastic polyps and are usually benign (noncancerous)  Certain types of polyps, called adenomatous polyps, may turn into cancer  DISCHARGE INSTRUCTIONS:   Follow up with your healthcare provider or gastroenterologist as directed: You may need to return for more tests, such as another colonoscopy  Write down your questions so you remember to ask them during your visits  Reduce your risk for colorectal polyps:   · Eat a variety of healthy foods:  Healthy foods include fruit, vegetables, whole-grain breads, low-fat dairy products, beans, lean meat, and fish  Ask if you need to be on a special diet  · Maintain a healthy weight:  Ask your healthcare provider if you need to lose weight and how much you need to lose  Ask for help with a weight loss program     · Exercise:  Begin to exercise slowly and do more as you get stronger  Talk with your healthcare provider before you start an exercise program      · Limit alcohol:  Your risk for polyps increases the more you drink  · Do not smoke: If you smoke, it is never too late to quit  Ask for information about how to stop  For support and more information:   · Joe Morrison ([facility]) [address], [place] [address]  Phone: [phone]  Web Address: www digestive  niddk nih gov    Contact your healthcare provider or gastroenterologist if:   · You have a fever  · You have chills, a cough, or feel weak and achy  · You have abdominal pain that does not go away or gets worse after you take medicine  · Your abdomen is swollen  · You are losing weight without trying      · You have questions or concerns about your condition or care  Seek care immediately or call 911 if:   · You have sudden shortness of breath  · You have a fast heart rate, fast breathing, or are too dizzy to stand up  · You have severe abdominal pain  · You see blood in your bowel movement  © Copyright [address] Information is for End User's use only and may not be sold, redistributed or otherwise used for commercial purposes  All illustrations and images included in CareNotes® are the copyrighted property of A D A M , Inc  or [address] [place], [place] [address]paD[facility]  The above information is an  only  It is not intended as medical advice for individual conditions or treatments  Talk to your doctor, nurse or pharmacist before following any medical regimen to see if it is safe and effective for you  Diverticulosis   WHAT YOU NEED TO KNOW:   What is diverticulosis? Diverticulosis is a condition that causes small pockets called diverticula to form in your intestine  These pockets make it difficult for bowel movements to pass through your digestive system  What causes diverticulosis? Diverticula form when muscles have to work hard to move bowel movements through the intestine  The force causes bulges to form at weak areas in the intestine  This may happen if you eat foods that are low in fiber  Fiber helps give your bowel movements more bulk so they are larger and easier to move through your colon  The following may increase your risk of diverticulosis:  · A history of constipation    · Age 36 or older    · Obesity    · Lack of exercise    What are the signs and symptoms of diverticulosis? Diverticulosis usually does not cause any signs or symptoms  It may cause any of the following in some people:  · Pain or discomfort in your lower abdomen    · Abdominal bloating    · Constipation or diarrhea    How is diverticulosis diagnosed? Your healthcare provider will examine you and ask about your bowel movements, diet, and symptoms   He or she will also ask about any medical conditions you have or medicines you take  You may need any of the following:  · Blood tests  may be done to check for signs of inflammation  · A barium enema  is an x-ray of your colon that may show diverticula  A tube is put into your anus, and a liquid called barium is put through the tube  Barium is used so that healthcare providers can see your colon more clearly  · Flexible sigmoidoscopy  is a test to look for any changes in your lower intestines and rectum  It may also show the cause of any bleeding or pain  A soft, bendable tube with a light on the end will be put into your anus  It will then be moved forward into your intestine  · A colonoscopy  is used to look at your whole colon  A scope (long bendable tube with a light on the end) is used to take pictures  This test may show diverticula  · A CT scan , or CAT scan, may show diverticula  You may be given contrast liquid before the scan  Tell the healthcare provider if you have ever had an allergic reaction to contrast liquid  How is diverticulosis managed? The goal of treatment is to manage any symptoms you have and prevent other problems such as diverticulitis  Diverticulitis is swelling or infection of the diverticula  Your healthcare provider may recommend any of the following:  · Eat a variety of high-fiber foods  High-fiber foods help you have regular bowel movements  High-fiber foods include cooked beans, fruits, vegetables, and some cereals  Most adults need 25 to 35 grams of fiber each day  Your healthcare provider may recommend that you have more  Ask your healthcare provider how much fiber you need  Increase fiber slowly  You may have abdominal discomfort, bloating, and gas if you add fiber to your diet too quickly  You may need to take a fiber supplement if you are not getting enough fiber from food  · Medicines  to soften your bowel movements may be given   You may also need medicines to treat symptoms such as bloating and pain  · Drink liquids as directed  You may need to drink 2 to 3 liters (8 to 12 cups) of liquids every day  Ask your healthcare provider how much liquid to drink each day and which liquids are best for you  · Apply heat  on your abdomen for 20 to 30 minutes every 2 hours for as many days as directed  Heat helps decrease pain and muscle spasms  How can I help prevent diverticulitis or other symptoms? The following may help decrease your risk for diverticulitis or symptoms, such as bleeding  Talk to your provider about these or other things you can do to prevent problems that may occur with diverticulosis  · Exercise regularly  Ask your healthcare provider about the best exercise plan for you  Exercise can help you have regular bowel movements  Get 30 minutes of exercise on most days of the week  · Maintain a healthy weight  Ask your healthcare provider how much you should weigh  Ask him or her to help you create a weight loss plan if you are overweight  · Do not smoke  Nicotine and other chemicals in cigarettes increase your risk for diverticulitis  Ask your healthcare provider for information if you currently smoke and need help to quit  E-cigarettes or smokeless tobacco still contain nicotine  Talk to your healthcare provider before you use these products  · Ask your healthcare provider if it is safe to take NSAIDs  NSAIDs may increase your risk of diverticulitis  When should I seek immediate care? · You have severe pain on the left side of your lower abdomen  · Your bowel movements are bright or dark red  When should I contact my healthcare provider? · You have a fever and chills  · You feel dizzy or lightheaded  · You have nausea, or you are vomiting  · You have a change in your bowel movements  · You have questions or concerns about your condition or care      CARE AGREEMENT:   You have the right to help plan your care  Learn about your health condition and how it may be treated  Discuss treatment options with your healthcare providers to decide what care you want to receive  You always have the right to refuse treatment  The above information is an  only  It is not intended as medical advice for individual conditions or treatments  Talk to your doctor, nurse or pharmacist before following any medical regimen to see if it is safe and effective for you  © Copyright [employer] 2021 Information is for End User's use only and may not be sold, redistributed or otherwise used for commercial purposes   All illustrations and images included in CareNotes® are the copyrighted property of [employer]  or [address] [place] [address] [employer]

## 2021-07-20 NOTE — H&P
History and Physical - [facility]  Tristian Aguilar 77 y o  female MRN: [id number]                  HPI: Tristian Aguilar is a 77y o  year old female who presents for history of colon polyps      REVIEW OF SYSTEMS: Per the HPI, and otherwise unremarkable  Historical Information   Past Medical History:   Diagnosis Date    Chicken pox     Kidney stones     Malignant neoplasm of skin     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     Past Surgical History:   Procedure Laterality Date    COLONOSCOPY      COLONOSCOPY      COMPLETE; DUE 3 YRS    COLONOSCOPY  2018    polyp x1    DILATION AND CURETTAGE OF UTERUS  2013    THICKENED ENDO ON USG, CVX STENOSIS, PATH: BENIGN POLYP FRAGMENTS AND INACTIVE ENDOMETRIUM  NO NEOPLASIA ; IMPRESSION: 10/27/14; [facility]    HYSTEROSCOPY      MOHS SURGERY  2020    BCC on face    TONSILLECTOMY       Social History   Social History     Substance and Sexual Activity   Alcohol Use Yes    Comment: Maybe 5x per year     Social History     Substance and Sexual Activity   Drug Use Never     Social History     Tobacco Use   Smoking Status Former Smoker    Packs/day: 0 50    Years: 5 00    Pack years: 2 50    Quit date: 1970    Years since quittin 5   Smokeless Tobacco Never Used   Tobacco Comment    STARTED AT AGE 17   STOPPED AT AGE 26       Family History   Problem Relation Age of Onset    Kidney cancer Mother 79        RENAL CANCER    Hypertension Mother     Skin cancer Mother     Osteoporosis Mother     Osteoporotic fracture Mother         hip - age 80     Osteoporotic fracture Paternal Grandmother        Meds/Allergies       Current Outpatient Medications:     Calcium Carbonate-Vitamin D (CALCIUM 600+D) 600-200 MG-UNIT TABS    Cholecalciferol (VITAMIN D3) 1000 units CAPS    hydrochlorothiazide (HYDRODIURIL) 12 5 mg tablet    losartan (COZAAR) 50 mg tablet    Multiple Vitamins-Minerals (DAILY MULTI PO)    potassium chloride (K-DUR,KLOR-CON) 10 mEq tablet    denosumab (PROLIA) 60 mg/mL    Current Facility-Administered Medications:     sodium chloride 0 9 % infusion, 125 mL/hr, Intravenous, Continuous, 125 mL/hr at 07/20/21 1322    No Known Allergies    Objective     There were no vitals taken for this visit  PHYSICAL EXAM    Gen: NAD  Head: NCAT  CV: RRR  CHEST: Clear  ABD: soft, NT/ND  EXT: no edema      ASSESSMENT/PLAN:  This is a 77y o  year old female here for colonoscopy, and she is stable and optimized for her procedure

## 2021-07-20 NOTE — ANESTHESIA PREPROCEDURE EVALUATION
Procedure:  COLONOSCOPY    Relevant Problems   ANESTHESIA (within normal limits)      CARDIO   (+) Essential hypertension   (+) Heart murmur      ENDO   (+) Type 2 diabetes mellitus without complication, without long-term current use of insulin (HCC)      HEMATOLOGY   (+) Platelets decreased (HCC)      NEURO/PSYCH   (+) Hx of colonic polyps      Other   (+) Obesity (BMI 30 0-34 9)   (+) Osteoporosis        Physical Exam    Airway    Mallampati score: II  TM Distance: <3 FB  Neck ROM: full     Dental       Cardiovascular  Rhythm: regular, Rate: normal,     Pulmonary  Breath sounds clear to auscultation,     Other Findings        Anesthesia Plan  ASA Score- 2     Anesthesia Type- IV sedation with anesthesia with ASA Monitors  Additional Monitors:   Airway Plan:           Plan Factors-Exercise tolerance (METS): >4 METS  Chart reviewed  Patient summary reviewed  Patient is not a current smoker  Induction- intravenous  Postoperative Plan-     Informed Consent- Anesthetic plan and risks discussed with patient

## 2021-08-01 NOTE — PROGRESS NOTES
FAMILY PRACTICE OFFICE VISIT  [facility] - [facility] PRIMARY CARE       NAME: Orville Selby  AGE: 77 y o  SEX: female       : 1945        MRN: [id number]    DATE: 2021  TIME: 3:14 PM    Assessment and Plan     Problem List Items Addressed This Visit        Endocrine    Type 2 diabetes mellitus without complication, without long-term current use of insulin ([address] ) - Primary     Significantly improved and well controlled with diet  She is able to bring her A1c down from 6 9% is 6 0%  She was congratulated and encouraged to continue with her limited carbohydrate intake  Will continue to monitor  She reports having an eye exam in December and was encouraged to maintain this up-to-date  Eye exam form was provided today  Foot exam was updated today  She will follow up in 6 months  Call with any problems or concerns in the interim  Will recheck A1c in the office at that time  Lab Results   Component Value Date    HGBA1C 6 0 (H) 2021               Cardiovascular and Mediastinum    Essential hypertension     Well-controlled  Continue losartan 50 mg daily  Hydrochlorothiazide will be increased slightly to 25 mg daily to help with swelling that she has been experiencing  She was encouraged to monitor her blood pressure and let me know if she is getting lightheaded episodes or a significant drop in her blood pressure  She was given a slip to check labs in 2 weeks  Relevant Medications    furosemide (LASIX) 20 mg tablet    hydrochlorothiazide (HYDRODIURIL) 25 mg tablet       Musculoskeletal and Integument    Osteoporosis     She will continue with Dr Karrin Schwab for her Prolia injections every 6 months  She is up-to-date on her DEXA scan  Other    Dietary calcium deficiency     Patient will continue calcium supplementation  Recent labs showed normal calcium level  Obesity (BMI 30 0-34  9)     BMI Counseling: Body mass index is 30 8 kg/m²  The BMI is above normal  Nutrition recommendations include moderation in carbohydrate intake  Exercise recommendations include exercising 3-5 times per week  Platelets decreased ([address] )     Stable  Will continue to monitor  Hypokalemia     Patient is currently on 10 mEq of potassium chloride daily  We reviewed that she may need an adjustment in this with the increase in hydrochlorothiazide  Will recheck labs in 2 weeks  Relevant Orders    Comprehensive metabolic panel    Low hematocrit     Check iron level with labs as ordered  Relevant Orders    CBC and differential    Ferritin    Iron    TIBC    Transferrin    Leg swelling     Likely due to venous incompetence  Given script for Lasix to take 20 mg daily x4 days  She will then increase her hydrochlorothiazide to 25 mg daily  Hydrochlorothiazide will be held for the 4 days that she is on Lasix  Will check labs in 2 weeks  Relevant Medications    furosemide (LASIX) 20 mg tablet          Type 2 diabetes mellitus without complication, without long-term current use of insulin ([facility] [address] )  Significantly improved and well controlled with diet  She is able to bring her A1c down from 6 9% is 6 0%  She was congratulated and encouraged to continue with her limited carbohydrate intake  Will continue to monitor  She reports having an eye exam in December and was encouraged to maintain this up-to-date  Eye exam form was provided today  Foot exam was updated today  She will follow up in 6 months  Call with any problems or concerns in the interim  Will recheck A1c in the office at that time  Lab Results   Component Value Date    HGBA1C 6 0 (H) 07/14/2021       Essential hypertension  Well-controlled  Continue losartan 50 mg daily  Hydrochlorothiazide will be increased slightly to 25 mg daily to help with swelling that she has been experiencing    She was encouraged to monitor her blood pressure and let me know if she is getting lightheaded episodes or a significant drop in her blood pressure  She was given a slip to check labs in 2 weeks  Osteoporosis  She will continue with Dr Luisito Nuñez for her Prolia injections every 6 months  She is up-to-date on her DEXA scan  Obesity (BMI 30 0-34  9)  BMI Counseling: Body mass index is 30 8 kg/m²  The BMI is above normal  Nutrition recommendations include moderation in carbohydrate intake  Exercise recommendations include exercising 3-5 times per week  Platelets decreased ([address] )  Stable  Will continue to monitor  Dietary calcium deficiency  Patient will continue calcium supplementation  Recent labs showed normal calcium level  Hypokalemia  Patient is currently on 10 mEq of potassium chloride daily  We reviewed that she may need an adjustment in this with the increase in hydrochlorothiazide  Will recheck labs in 2 weeks  Leg swelling  Likely due to venous incompetence  Given script for Lasix to take 20 mg daily x4 days  She will then increase her hydrochlorothiazide to 25 mg daily  Hydrochlorothiazide will be held for the 4 days that she is on Lasix  Will check labs in 2 weeks  Low hematocrit  Check iron level with labs as ordered  Chief Complaint     Chief Complaint   Patient presents with    Follow-up     3m        History of Present Illness   Miller Runner is a 77y o -year-old female who presents for 3 month recheck on chronic problems  The patient presents today for follow-up on diabetes  At the last visit, A1c was 6 9% in 4/2021  The patient reports that current diabetic medications include none - controls with diet  Blood sugars are not checked at home  The patient denies hypoglycemic episodes  Last eye exam was 12/2020 - [facility] on [address]  Last foot exam was today  A1c from about 2 weeks ago showed improved A1c of 6 0%       The patient reports that current blood pressure medications include hydrochlorothiazide 12 5 mg daily and losartan 50 mg daily  Blood pressure readings at home are approximately 110-130/60s  The patient denies symptoms of poor control such as chest pain, shortness of breath, vision changes, headaches, or dizziness  The patient reports no caffeine intake and unlimited salt intake  Patient continues with Prolia every 6 months through Dr Irene Saldaña for her osteoporosis  Her last Dexa was in 10/2020  Patient did update her colonoscopy since last here  She notes that she has trouble with her legs swelling over the last few months  She thought it was from heat but not so sure now  She notes that it feels tight  They are better in the morning but not completely gone  She has trouble with it progressing through the day  She denies chest pain or shortness of breath  Review of Systems   Review of Systems   Constitutional: Negative for chills and fever  Respiratory: Negative for shortness of breath  Cardiovascular: Negative for chest pain, palpitations and leg swelling  Neurological: Negative for dizziness and headaches  Active Problem List     Patient Active Problem List   Diagnosis    Dietary calcium deficiency    Obesity (BMI 30 0-34  9)    Osteoporosis    Hx of colonic polyps    Heart murmur    Type 2 diabetes mellitus without complication, without long-term current use of insulin (HCC)    Platelets decreased (HCC)    Essential hypertension    Hypokalemia    Low hematocrit    Leg swelling         Past Medical History:  Past Medical History:   Diagnosis Date    Chicken pox     Fall 5/1/2019    Kidney stones     Malignant neoplasm of skin     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       Past Surgical History:  Past Surgical History:   Procedure Laterality Date    COLONOSCOPY  2018    COLONOSCOPY  2015    COMPLETE; DUE 3 YRS    COLONOSCOPY  06/20/2018    polyp x1    DILATION AND CURETTAGE OF UTERUS  08/2013    THICKENED ENDO ON USG, CVX STENOSIS, PATH: BENIGN POLYP FRAGMENTS AND INACTIVE ENDOMETRIUM  NO NEOPLASIA ; IMPRESSION: 10/27/14; [address]    HYSTEROSCOPY      MOHS SURGERY  2020    BCC on face    TONSILLECTOMY         Family History:  Family History   Problem Relation Age of Onset   [facility] Kidney cancer Mother 79        RENAL CANCER    Hypertension Mother     Skin cancer Mother     Osteoporosis Mother     Osteoporotic fracture Mother         hip - age 80     Osteoporotic fracture Paternal Grandmother        Social History:  Social History     Socioeconomic History    Marital status: Single     Spouse name: Not on file    Number of children: Not on file    Years of education: Not on file    Highest education level: Not on file   Occupational History    Occupation: [employer]   Tobacco Use    Smoking status: Former Smoker     Packs/day: 0 50     Years: 5 00     Pack years: 2 50     Quit date: 1970     Years since quittin 6    Smokeless tobacco: Never Used    Tobacco comment: STARTED AT AGE 18  STOPPED AT AGE 26     Vaping Use    Vaping Use: Never used   Substance and Sexual Activity    Alcohol use: Yes     Comment: Maybe 5x per year    Drug use: Never    Sexual activity: Not on file   Other Topics Concern    Not on file   Social History Narrative    EXERCISES MODERATELY LESS THAN 3 TIMES WEEK     Social Determinants of Health     Financial Resource Strain:     Difficulty of Paying Living Expenses:    Food Insecurity:     Worried About Running Out of Food in the Last Year:     Ran Out of Food in the Last Year:    Transportation Needs:     Lack of Transportation (Medical):      Lack of Transportation (Non-Medical):    Physical Activity:     Days of Exercise per Week:     Minutes of Exercise per Session:    Stress:     Feeling of Stress :    Social Connections:     Frequency of Communication with Friends and Family:     Frequency of Social Gatherings with Friends and Family:     Attends Hindu Services:     Active Member of [employer] Group or Organizations:     Attends Club or Organization Meetings:     Marital Status:    Intimate Partner Violence:     Fear of Current or Ex-Partner:     Emotionally Abused:     Physically Abused:     Sexually Abused:        Objective     Vitals:    08/02/21 1303   BP: 128/64   BP Location: Left arm   Patient Position: Sitting   Cuff Size: Adult   Pulse: 78   Resp: 16   Temp: 98 1 °F (36 7 °C)   TempSrc: Tympanic   SpO2: 98%   Weight: 73 9 kg (163 lb)   Height: 5' 1" (1 549 m)     Wt Readings from Last 3 Encounters:   08/02/21 73 9 kg (163 lb)   07/20/21 72 6 kg (160 lb)   06/02/21 72 6 kg (160 lb)       Physical Exam  Vitals reviewed  Constitutional:       General: She is not in acute distress  Appearance: Normal appearance  She is well-developed  She is obese  She is not ill-appearing  HENT:      Head: Normocephalic and atraumatic  Neck:      Thyroid: No thyromegaly  Cardiovascular:      Rate and Rhythm: Normal rate and regular rhythm  Pulses: Normal pulses  Heart sounds: Normal heart sounds  No murmur heard  Comments: No carotid bruits noted  Pulmonary:      Effort: Pulmonary effort is normal       Breath sounds: Normal breath sounds  No wheezing, rhonchi or rales  Musculoskeletal:      Cervical back: Neck supple  Right lower leg: Edema (1-2+ pitting in foot and lower leg to knee) present  Left lower leg: Edema (1+ pitting in foot and lower leg to knee) present  Comments: Negative Vik's bilaterally   Lymphadenopathy:      Cervical: No cervical adenopathy  Neurological:      Mental Status: She is alert  Psychiatric:         Mood and Affect: Mood normal          Behavior: Behavior normal          Thought Content:  Thought content normal          Judgment: Judgment normal          Pertinent Laboratory/Diagnostic Studies:  Lab Results   Component Value Date    BUN 13 07/14/2021    CREATININE 0 78 07/14/2021    CALCIUM 9 8 07/14/2021    K 3 5 07/14/2021    CO2 26 07/14/2021     07/14/2021     Lab Results   Component Value Date    ALT 17 07/14/2021    AST 37 (H) 07/14/2021    ALKPHOS 64 07/14/2021       Lab Results   Component Value Date    WBC 4 2 07/14/2021    HGB 12 1 07/14/2021    HCT 34 8 (L) 07/14/2021     6 (H) 07/14/2021     (L) 07/14/2021     Lab Results   Component Value Date    TRIG 74 07/14/2021     Lab Results   Component Value Date    HDL 60 07/14/2021     Lab Results   Component Value Date    LDLCALC 83 07/14/2021     Lab Results   Component Value Date    HGBA1C 6 0 (H) 07/14/2021       Results for orders placed or performed during the hospital encounter of 07/20/21   Tissue Exam   Result Value Ref Range    Case Report       Surgical Pathology Report                         Case: [id number]                                   Authorizing Provider:  Yaquelin Fitzgerald MD          Collected:           07/20/2021 1401              Ordering Location:     [facility]        Received:            07/20/2021 [facility] Endoscopy                                                     Pathologist:           Tamar Bang MD                                                                Specimens:   A) - Large Intestine, Transverse Colon, polyp cold snare                                            B) - Large Intestine, Left/Descending Colon, polyp cold snare                              Final Diagnosis       A  Transverse colon polyp (cold snare):     - Portions of polypoid colonic mucosa with focal suggestion of adenomatous epithelium; negative for high-grade dysplasia  B  Descending colon polyp (cold snare):     - Tubular adenoma; negative for high-grade dysplasia  Additional Information       All reported additional testing was performed with appropriately reactive controls    These tests were developed and their performance characteristics determined by [facility] or appropriate performing facility, though some tests may be performed on tissues which have not been validated for performance characteristics (such as staining performed on alcohol exposed cell blocks and decalcified tissues)  Results should be interpreted with caution and in the context of the patients clinical condition  These tests may not be cleared or approved by the [place]  Food and Drug Administration, though the FDA has determined that such clearance or approval is not necessary  These tests are used for clinical purposes and they should not be regarded as investigational or for research  This laboratory has been approved by [facility], designated as a high-complexity laboratory and is qualified to perform these tests  Interpretation performed at [place] [address]  Ruby Patterson Synoptic Checklist         (COLON/RECTUM POLYP FORM - GI - All Specimens)             : Adenoma(s)      Gross Description          A  The specimen is received in formalin, labeled with the patient's name and hospital number, and is designated "transverse colon polyp  The specimen consists of 2 tan-pink, focally friable soft tissue fragments measuring 0 4 and 1 0 cm in greatest dimension  The specimen is entirely submitted in a screened cassette  B  The specimen is received in formalin, labeled with the patient's name and hospital number, and is designated "descending colon polyp  The specimen consists of a single tan-pink, rubbery polypoid portion of tissue measuring 0 6 cm in greatest dimension  The specimen is entirely submitted in a screened cassette  Note: The estimated total formalin fixation time based upon information provided by the submitting clinician and the standard processing schedule is under 72 hours    Yee               ALLERGIES:  No Known Allergies    Current Medications     Current Outpatient Medications   Medication Sig Dispense Refill    Calcium Carbonate-Vitamin D (CALCIUM 600+D) 600-200 MG-UNIT TABS Take 1 tablet by mouth 2 (two) times a day      Cholecalciferol (VITAMIN D3) 1000 units CAPS Take by mouth daily        denosumab (PROLIA) 60 mg/mL Inject under the skin every 6 (six) months        hydrochlorothiazide (HYDRODIURIL) 25 mg tablet Take 1 tablet (25 mg total) by mouth daily 30 tablet 5    losartan (COZAAR) 50 mg tablet Take 1 tablet (50 mg total) by mouth daily 30 tablet 3    Multiple Vitamins-Minerals (DAILY MULTI PO) Take by mouth daily        potassium chloride (K-DUR,KLOR-CON) 10 mEq tablet Take 1 tablet (10 mEq total) by mouth daily 30 tablet 5    furosemide (LASIX) 20 mg tablet Take 1 tablet (20 mg total) by mouth daily for 4 days 4 tablet 0     No current facility-administered medications for this visit           Health Maintenance     Health Maintenance   Topic Date Due    DM Eye Exam  Never done    Falls: Plan of Care  Never done    PT PLAN OF CARE  05/17/2018    Influenza Vaccine (1) 09/01/2021    HEMOGLOBIN A1C  01/14/2022    Breast Cancer Screening: Mammogram  04/20/2022    Fall Risk  04/26/2022    Depression Screening PHQ  04/26/2022    Medicare Annual Wellness Visit (AWV)  04/26/2022    BMI: Followup Plan  08/02/2022    BMI: Adult  08/02/2022    Diabetic Foot Exam  08/02/2022    Colorectal Cancer Screening  07/19/2024    DTaP,Tdap,and Td Vaccines (2 - Td or Tdap) 04/30/2029    Hepatitis C Screening  Completed    Pneumococcal Vaccine: 65+ Years  Completed    COVID-19 Vaccine  Completed    HIB Vaccine  Aged Out    Hepatitis B Vaccine  Aged Out    IPV Vaccine  Aged Out    Hepatitis A Vaccine  Aged Out    Meningococcal ACWY Vaccine  Aged Out    HPV Vaccine  Aged Out     Immunization History   Administered Date(s) Administered    INFLUENZA 10/24/2007, 10/23/2017    Influenza Split High Dose Preservative Free IM 10/24/2018    Influenza, high dose seasonal 0 7 mL 09/26/2019    Influenza, seasonal, injectable 10/24/2007    Pneumococcal Conjugate 13-Valent 10/23/2017    Pneumococcal Polysaccharide PPV23 10/24/2018    SARS-CoV-2 / COVID-19 mRNA IM (Moderna) 02/06/2021, 03/09/2021    Tdap 04/30/2019    Zoster 06/17/2016       Shamika Langley PA-C  8/2/2021 3:14 PM  [facility]

## 2021-08-02 ENCOUNTER — OFFICE VISIT (OUTPATIENT)
Dept: FAMILY MEDICINE CLINIC | Facility: CLINIC | Age: 76
End: 2021-08-02
Payer: MEDICARE

## 2021-08-02 VITALS
SYSTOLIC BLOOD PRESSURE: 128 MMHG | RESPIRATION RATE: 16 BRPM | HEIGHT: 61 IN | WEIGHT: 163 LBS | BODY MASS INDEX: 30.78 KG/M2 | OXYGEN SATURATION: 98 % | HEART RATE: 78 BPM | TEMPERATURE: 98.1 F | DIASTOLIC BLOOD PRESSURE: 64 MMHG

## 2021-08-02 DIAGNOSIS — D64.9 LOW HEMATOCRIT: ICD-10-CM

## 2021-08-02 DIAGNOSIS — D69.6 PLATELETS DECREASED (HCC): ICD-10-CM

## 2021-08-02 DIAGNOSIS — E66.9 OBESITY (BMI 30.0-34.9): ICD-10-CM

## 2021-08-02 DIAGNOSIS — I10 ESSENTIAL HYPERTENSION: ICD-10-CM

## 2021-08-02 DIAGNOSIS — E58 DIETARY CALCIUM DEFICIENCY: ICD-10-CM

## 2021-08-02 DIAGNOSIS — M81.0 OSTEOPOROSIS, UNSPECIFIED OSTEOPOROSIS TYPE, UNSPECIFIED PATHOLOGICAL FRACTURE PRESENCE: ICD-10-CM

## 2021-08-02 DIAGNOSIS — E11.9 TYPE 2 DIABETES MELLITUS WITHOUT COMPLICATION, WITHOUT LONG-TERM CURRENT USE OF INSULIN (HCC): Primary | ICD-10-CM

## 2021-08-02 DIAGNOSIS — M79.89 LEG SWELLING: ICD-10-CM

## 2021-08-02 DIAGNOSIS — E87.6 HYPOKALEMIA: ICD-10-CM
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
## 2021-08-02 PROCEDURE — 99214 OFFICE O/P EST MOD 30 MIN: CPT | Performed by: PHYSICIAN ASSISTANT

## 2021-08-02 RX ORDER — FUROSEMIDE 20 MG/1
20 TABLET ORAL DAILY
Qty: 4 TABLET | Refills: 0 | Status: SHIPPED | OUTPATIENT
Start: 2021-08-02 | End: 2022-02-02

## 2021-08-02 RX ORDER — HYDROCHLOROTHIAZIDE 25 MG/1
25 TABLET ORAL DAILY
Qty: 30 TABLET | Refills: 5 | Status: SHIPPED | OUTPATIENT
Start: 2021-08-02 | End: 2022-02-02 | Stop reason: SDUPTHER

## 2021-08-02 NOTE — PROGRESS NOTES
Diabetic Foot Exam    Patient's shoes and socks removed  Right Foot/Ankle   Right Foot Inspection  Skin Exam: skin normal and skin intact no dry skin, no warmth, no callus, no erythema, no maceration, no abnormal color, no pre-ulcer, no ulcer and no callus                          Toe Exam: ROM and strength within normal limits and swelling  Sensory       Monofilament testing: intact  Vascular    The right DP pulse is 2+  The right PT pulse is 2+  Left Foot/Ankle  Left Foot Inspection  Skin Exam: skin normal and skin intactno dry skin, no warmth, no erythema, no maceration, normal color, no pre-ulcer, no ulcer and no callus                         Toe Exam: ROM and strength within normal limits and swelling                   Sensory       Monofilament: intact  Vascular    The left DP pulse is 2+  The left PT pulse is 2+  Assign Risk Category:  No deformity present; No loss of protective sensation;  No weak pulses       Risk: 0

## 2021-08-02 NOTE — ASSESSMENT & PLAN NOTE
She will continue with Dr Payam Peña for her Prolia injections every 6 months  She is up-to-date on her DEXA scan

## 2021-08-02 NOTE — ASSESSMENT & PLAN NOTE
Likely due to venous incompetence  Given script for Lasix to take 20 mg daily x4 days  She will then increase her hydrochlorothiazide to 25 mg daily  Hydrochlorothiazide will be held for the 4 days that she is on Lasix  Will check labs in 2 weeks

## 2021-08-02 NOTE — ASSESSMENT & PLAN NOTE
Significantly improved and well controlled with diet  She is able to bring her A1c down from 6 9% is 6 0%  She was congratulated and encouraged to continue with her limited carbohydrate intake  Will continue to monitor  She reports having an eye exam in December and was encouraged to maintain this up-to-date  Eye exam form was provided today  Foot exam was updated today  She will follow up in 6 months  Call with any problems or concerns in the interim  Will recheck A1c in the office at that time    Lab Results   Component Value Date    HGBA1C 6 0 (H) 07/14/2021

## 2021-08-02 NOTE — ASSESSMENT & PLAN NOTE
Patient is currently on 10 mEq of potassium chloride daily  We reviewed that she may need an adjustment in this with the increase in hydrochlorothiazide  Will recheck labs in 2 weeks

## 2021-08-02 NOTE — ASSESSMENT & PLAN NOTE
Well-controlled  Continue losartan 50 mg daily  Hydrochlorothiazide will be increased slightly to 25 mg daily to help with swelling that she has been experiencing  She was encouraged to monitor her blood pressure and let me know if she is getting lightheaded episodes or a significant drop in her blood pressure  She was given a slip to check labs in 2 weeks

## 2021-08-02 NOTE — ASSESSMENT & PLAN NOTE
BMI Counseling: Body mass index is 30 8 kg/m²  The BMI is above normal  Nutrition recommendations include moderation in carbohydrate intake  Exercise recommendations include exercising 3-5 times per week

## 2021-08-31 LAB
ALBUMIN SERPL-MCNC: 3.2 G/DL (ref 3.6–5.1)
ALBUMIN/GLOB SERPL: 0.9 (CALC) (ref 1–2.5)
ALP SERPL-CCNC: 79 U/L (ref 37–153)
ALT SERPL-CCNC: 16 U/L (ref 6–29)
AST SERPL-CCNC: 38 U/L (ref 10–35)
BASOPHILS # BLD AUTO: 28 CELLS/UL (ref 0–200)
BASOPHILS NFR BLD AUTO: 0.6 %
BILIRUB SERPL-MCNC: 1.9 MG/DL (ref 0.2–1.2)
BUN SERPL-MCNC: 13 MG/DL (ref 7–25)
BUN/CREAT SERPL: ABNORMAL (CALC) (ref 6–22)
CALCIUM SERPL-MCNC: 10.3 MG/DL (ref 8.6–10.4)
CHLORIDE SERPL-SCNC: 103 MMOL/L (ref 98–110)
CO2 SERPL-SCNC: 27 MMOL/L (ref 20–32)
CREAT SERPL-MCNC: 0.8 MG/DL (ref 0.6–0.93)
EOSINOPHIL # BLD AUTO: 183 CELLS/UL (ref 15–500)
EOSINOPHIL NFR BLD AUTO: 3.9 %
ERYTHROCYTE [DISTWIDTH] IN BLOOD BY AUTOMATED COUNT: 13.1 % (ref 11–15)
FERRITIN SERPL-MCNC: 84 NG/ML (ref 16–288)
GLOBULIN SER CALC-MCNC: 3.4 G/DL (CALC) (ref 1.9–3.7)
GLUCOSE SERPL-MCNC: 110 MG/DL (ref 65–99)
HCT VFR BLD AUTO: 35.1 % (ref 35–45)
HGB BLD-MCNC: 12.1 G/DL (ref 11.7–15.5)
IRON SATN MFR SERPL: 64 % (CALC) (ref 16–45)
IRON SERPL-MCNC: 179 MCG/DL (ref 45–160)
LYMPHOCYTES # BLD AUTO: 1354 CELLS/UL (ref 850–3900)
LYMPHOCYTES NFR BLD AUTO: 28.8 %
MCH RBC QN AUTO: 35.1 PG (ref 27–33)
MCHC RBC AUTO-ENTMCNC: 34.5 G/DL (ref 32–36)
MCV RBC AUTO: 101.7 FL (ref 80–100)
MONOCYTES # BLD AUTO: 376 CELLS/UL (ref 200–950)
MONOCYTES NFR BLD AUTO: 8 %
NEUTROPHILS # BLD AUTO: 2759 CELLS/UL (ref 1500–7800)
NEUTROPHILS NFR BLD AUTO: 58.7 %
PLATELET # BLD AUTO: 112 THOUSAND/UL (ref 140–400)
PMV BLD REES-ECKER: 10.8 FL (ref 7.5–12.5)
POTASSIUM SERPL-SCNC: 3.6 MMOL/L (ref 3.5–5.3)
PROT SERPL-MCNC: 6.6 G/DL (ref 6.1–8.1)
RBC # BLD AUTO: 3.45 MILLION/UL (ref 3.8–5.1)
SL AMB EGFR AFRICAN AMERICAN: 84 ML/MIN/1.73M2
SL AMB EGFR NON AFRICAN AMERICAN: 72 ML/MIN/1.73M2
SODIUM SERPL-SCNC: 138 MMOL/L (ref 135–146)
TIBC SERPL-MCNC: 278 MCG/DL (CALC) (ref 250–450)
TRANSFERRIN SERPL-MCNC: 211 MG/DL (ref 188–341)
WBC # BLD AUTO: 4.7 THOUSAND/UL (ref 3.8–10.8)

## 2021-09-06 DIAGNOSIS — I10 ESSENTIAL HYPERTENSION: ICD-10-CM

## 2021-09-06 RX ORDER — LOSARTAN POTASSIUM 50 MG/1
50 TABLET ORAL DAILY
Qty: 30 TABLET | Refills: 0 | Status: SHIPPED | OUTPATIENT
Start: 2021-09-06 | End: 2021-10-06 | Stop reason: SDUPTHER

## 2021-09-06 NOTE — TELEPHONE ENCOUNTER
The patient is requesting a refill on the following medication: losartan (COZAAR) 50 mg tablet  The patient has 1-day left

## 2021-09-24 ENCOUNTER — OFFICE VISIT (OUTPATIENT)
Dept: FAMILY MEDICINE CLINIC | Facility: CLINIC | Age: 76
End: 2021-09-24
Payer: MEDICARE

## 2021-09-24 ENCOUNTER — APPOINTMENT (OUTPATIENT)
Dept: RADIOLOGY | Facility: MEDICAL CENTER | Age: 76
End: 2021-09-24
Payer: MEDICARE

## 2021-09-24 VITALS
OXYGEN SATURATION: 97 % | HEIGHT: 61 IN | SYSTOLIC BLOOD PRESSURE: 120 MMHG | DIASTOLIC BLOOD PRESSURE: 78 MMHG | BODY MASS INDEX: 30.58 KG/M2 | TEMPERATURE: 98.2 F | HEART RATE: 81 BPM | WEIGHT: 162 LBS

## 2021-09-24 DIAGNOSIS — M25.552 LEFT HIP PAIN: ICD-10-CM

## 2021-09-24 DIAGNOSIS — M54.50 ACUTE LEFT-SIDED LOW BACK PAIN WITHOUT SCIATICA: ICD-10-CM

## 2021-09-24 DIAGNOSIS — M54.41 ACUTE RIGHT-SIDED LOW BACK PAIN WITH RIGHT-SIDED SCIATICA: ICD-10-CM

## 2021-09-24 DIAGNOSIS — M54.50 ACUTE LEFT-SIDED LOW BACK PAIN WITHOUT SCIATICA: Primary | ICD-10-CM

## 2021-09-24 DIAGNOSIS — Z23 ENCOUNTER FOR IMMUNIZATION: ICD-10-CM

## 2021-09-24 DIAGNOSIS — M25.551 RIGHT HIP PAIN: ICD-10-CM

## 2021-09-24 PROCEDURE — 99213 OFFICE O/P EST LOW 20 MIN: CPT | Performed by: INTERNAL MEDICINE

## 2021-09-24 PROCEDURE — 73502 X-RAY EXAM HIP UNI 2-3 VIEWS: CPT

## 2021-09-24 PROCEDURE — 90662 IIV NO PRSV INCREASED AG IM: CPT

## 2021-09-24 PROCEDURE — G0008 ADMIN INFLUENZA VIRUS VAC: HCPCS

## 2021-09-24 PROCEDURE — 72110 X-RAY EXAM L-2 SPINE 4/>VWS: CPT

## 2021-09-24 RX ORDER — NAPROXEN 500 MG/1
500 TABLET ORAL 2 TIMES DAILY WITH MEALS
Qty: 14 TABLET | Refills: 0 | Status: SHIPPED | OUTPATIENT
Start: 2021-09-24 | End: 2021-09-24 | Stop reason: CLARIF

## 2021-09-24 RX ORDER — NAPROXEN 500 MG/1
500 TABLET ORAL 2 TIMES DAILY WITH MEALS
Qty: 14 TABLET | Refills: 0 | Status: SHIPPED | OUTPATIENT
Start: 2021-09-24 | End: 2022-05-13

## 2021-09-24 NOTE — PROGRESS NOTES
Assessment/Plan:    No problem-specific Assessment & Plan notes found for this encounter  Diagnoses and all orders for this visit:    Acute left-sided low back pain without sciatica  -     Cancel: XR spine lumbar 2 or 3 views injury; Future  -     naproxen (NAPROSYN) 500 mg tablet; Take 1 tablet (500 mg total) by mouth 2 (two) times a day with meals for 7 days    Encounter for immunization  -     influenza vaccine, high-dose, PF 0 7 mL (FLUZONE HIGH-DOSE)    Left hip pain  -     XR hip/pelv 2-3 vws left if performed; Future  -     naproxen (NAPROSYN) 500 mg tablet; Take 1 tablet (500 mg total) by mouth 2 (two) times a day with meals for 7 days    Other orders  -     Cancel: XR spine lumbar 2 or 3 views injury; Future  -     Cancel: XR hip/pelv 2-3 vws right if performed; Future  -     Discontinue: naproxen (NAPROSYN) 500 mg tablet; Take 1 tablet (500 mg total) by mouth 2 (two) times a day with meals for 7 days        Patient came today with complaints of a pain in her left lower back that started few days ago, she reports that is moving to her left hip and down to her left lower extremity  She does not report any problems with urination defecation  No chills or fevers, no recent trauma but she said that she fell over her dog few months ago and she fractured ribs  She has history of osteoporosis, that might be a concern for compression fracture  Will send for x-ray of the left hip and the lumbar spine  Start naproxen as for now, normal kidney function recently  Subjective:      Patient ID: Travon Mcginnis is a 77 y o  female  Patient came today with complaints of a pain in her left lower back that started few days ago, she reports that is moving to her left hip and down to her left lower extremity  She does not report any problems with urination defecation  No chills or fevers, no recent trauma but she said that she fell over her dog few months ago and she fractured ribs    She has history of osteoporosis, that might be a concern for compression fracture  Will send for x-ray of the left hip and the lumbar spine  Start naproxen as for now, normal kidney function recently  The following portions of the patient's history were reviewed and updated as appropriate: allergies, current medications, past family history, past medical history, past social history, past surgical history, and problem list     Review of Systems   Constitutional: Negative for chills and fever  Genitourinary: Negative for decreased urine volume, dysuria, flank pain, frequency and urgency  Musculoskeletal: Positive for arthralgias and back pain  Negative for joint swelling  Neurological: Negative for weakness and numbness  Psychiatric/Behavioral: Negative for confusion  Objective:      /78 (BP Location: Left arm, Patient Position: Sitting, Cuff Size: Standard)   Pulse 81   Temp 98 2 °F (36 8 °C) (Tympanic)   Ht 5' 1" (1 549 m)   Wt 73 5 kg (162 lb)   SpO2 97%   BMI 30 61 kg/m²          Physical Exam  Constitutional:       General: She is not in acute distress  Appearance: She is not toxic-appearing  HENT:      Head: Normocephalic  Cardiovascular:      Rate and Rhythm: Normal rate  Pulses: Normal pulses  Pulmonary:      Effort: Pulmonary effort is normal    Abdominal:      General: Abdomen is flat  Musculoskeletal:         General: Tenderness present  No swelling, deformity or signs of injury  Skin:     General: Skin is warm  Neurological:      Mental Status: She is alert  Cranial Nerves: No cranial nerve deficit  Sensory: No sensory deficit  Motor: No weakness        Gait: Gait normal    Psychiatric:         Mood and Affect: Mood normal                Current Outpatient Medications:     Calcium Carbonate-Vitamin D (CALCIUM 600+D) 600-200 MG-UNIT TABS, Take 1 tablet by mouth 2 (two) times a day, Disp: , Rfl:     Cholecalciferol (VITAMIN D3) 1000 units CAPS, Take by mouth daily  , Disp: , Rfl:     denosumab (PROLIA) 60 mg/mL, Inject under the skin every 6 (six) months  , Disp: , Rfl:     hydrochlorothiazide (HYDRODIURIL) 25 mg tablet, Take 1 tablet (25 mg total) by mouth daily, Disp: 30 tablet, Rfl: 5    losartan (COZAAR) 50 mg tablet, Take 1 tablet (50 mg total) by mouth daily, Disp: 30 tablet, Rfl: 0    Multiple Vitamins-Minerals (DAILY MULTI PO), Take by mouth daily  , Disp: , Rfl:     potassium chloride (K-DUR,KLOR-CON) 10 mEq tablet, Take 1 tablet (10 mEq total) by mouth daily, Disp: 30 tablet, Rfl: 5    furosemide (LASIX) 20 mg tablet, Take 1 tablet (20 mg total) by mouth daily for 4 days, Disp: 4 tablet, Rfl: 0    naproxen (NAPROSYN) 500 mg tablet, Take 1 tablet (500 mg total) by mouth 2 (two) times a day with meals for 7 days, Disp: 14 tablet, Rfl: 0

## 2021-10-06 DIAGNOSIS — I10 ESSENTIAL HYPERTENSION: ICD-10-CM

## 2021-10-06 RX ORDER — LOSARTAN POTASSIUM 50 MG/1
50 TABLET ORAL DAILY
Qty: 30 TABLET | Refills: 0 | Status: SHIPPED | OUTPATIENT
Start: 2021-10-06 | End: 2021-11-03 | Stop reason: SDUPTHER

## 2021-10-06 RX ORDER — POTASSIUM CHLORIDE 750 MG/1
10 TABLET, EXTENDED RELEASE ORAL DAILY
Qty: 30 TABLET | Refills: 5 | Status: SHIPPED | OUTPATIENT
Start: 2021-10-06 | End: 2022-02-02 | Stop reason: SDUPTHER

## 2021-10-18 LAB
BASOPHILS # BLD AUTO: 28 CELLS/UL (ref 0–200)
BASOPHILS NFR BLD AUTO: 0.7 %
EOSINOPHIL # BLD AUTO: 168 CELLS/UL (ref 15–500)
EOSINOPHIL NFR BLD AUTO: 4.2 %
ERYTHROCYTE [DISTWIDTH] IN BLOOD BY AUTOMATED COUNT: 12.9 % (ref 11–15)
FERRITIN SERPL-MCNC: 69 NG/ML (ref 16–288)
HCT VFR BLD AUTO: 32.6 % (ref 35–45)
HGB BLD-MCNC: 11.2 G/DL (ref 11.7–15.5)
IRON SATN MFR SERPL: 43 % (CALC) (ref 16–45)
IRON SERPL-MCNC: 124 MCG/DL (ref 45–160)
LYMPHOCYTES # BLD AUTO: 1268 CELLS/UL (ref 850–3900)
LYMPHOCYTES NFR BLD AUTO: 31.7 %
MCH RBC QN AUTO: 35.9 PG (ref 27–33)
MCHC RBC AUTO-ENTMCNC: 34.4 G/DL (ref 32–36)
MCV RBC AUTO: 104.5 FL (ref 80–100)
MONOCYTES # BLD AUTO: 336 CELLS/UL (ref 200–950)
MONOCYTES NFR BLD AUTO: 8.4 %
NEUTROPHILS # BLD AUTO: 2200 CELLS/UL (ref 1500–7800)
NEUTROPHILS NFR BLD AUTO: 55 %
PLATELET # BLD AUTO: 129 THOUSAND/UL (ref 140–400)
PMV BLD REES-ECKER: 10.5 FL (ref 7.5–12.5)
RBC # BLD AUTO: 3.12 MILLION/UL (ref 3.8–5.1)
TIBC SERPL-MCNC: 289 MCG/DL (CALC) (ref 250–450)
WBC # BLD AUTO: 4 THOUSAND/UL (ref 3.8–10.8)

## 2021-10-28 PROBLEM — M15.0 PRIMARY GENERALIZED (OSTEO)ARTHRITIS: Status: ACTIVE | Noted: 2021-10-28

## 2021-11-01 PROBLEM — M47.816 LUMBAR SPONDYLOSIS: Status: ACTIVE | Noted: 2021-11-01

## 2021-11-03 DIAGNOSIS — I10 ESSENTIAL HYPERTENSION: ICD-10-CM

## 2021-11-03 RX ORDER — LOSARTAN POTASSIUM 50 MG/1
50 TABLET ORAL DAILY
Qty: 30 TABLET | Refills: 0 | Status: SHIPPED | OUTPATIENT
Start: 2021-11-03 | End: 2021-12-06 | Stop reason: SDUPTHER

## 2021-11-10 LAB
BASOPHILS # BLD AUTO: 21 CELLS/UL (ref 0–200)
BASOPHILS NFR BLD AUTO: 0.5 %
EOSINOPHIL # BLD AUTO: 168 CELLS/UL (ref 15–500)
EOSINOPHIL NFR BLD AUTO: 4.1 %
ERYTHROCYTE [DISTWIDTH] IN BLOOD BY AUTOMATED COUNT: 12.4 % (ref 11–15)
FOLATE SERPL-MCNC: 18.6 NG/ML
HCT VFR BLD AUTO: 33.7 % (ref 35–45)
HGB BLD-MCNC: 11.8 G/DL (ref 11.7–15.5)
LYMPHOCYTES # BLD AUTO: 1316 CELLS/UL (ref 850–3900)
LYMPHOCYTES NFR BLD AUTO: 32.1 %
MCH RBC QN AUTO: 36 PG (ref 27–33)
MCHC RBC AUTO-ENTMCNC: 35 G/DL (ref 32–36)
MCV RBC AUTO: 102.7 FL (ref 80–100)
MONOCYTES # BLD AUTO: 308 CELLS/UL (ref 200–950)
MONOCYTES NFR BLD AUTO: 7.5 %
NEUTROPHILS # BLD AUTO: 2288 CELLS/UL (ref 1500–7800)
NEUTROPHILS NFR BLD AUTO: 55.8 %
PLATELET # BLD AUTO: 116 THOUSAND/UL (ref 140–400)
PMV BLD REES-ECKER: 10.7 FL (ref 7.5–12.5)
RBC # BLD AUTO: 3.28 MILLION/UL (ref 3.8–5.1)
VIT B12 SERPL-MCNC: 1056 PG/ML (ref 200–1100)
WBC # BLD AUTO: 4.1 THOUSAND/UL (ref 3.8–10.8)

## 2021-12-06 DIAGNOSIS — I10 ESSENTIAL HYPERTENSION: ICD-10-CM

## 2021-12-06 RX ORDER — LOSARTAN POTASSIUM 50 MG/1
50 TABLET ORAL DAILY
Qty: 30 TABLET | Refills: 0 | Status: SHIPPED | OUTPATIENT
Start: 2021-12-06 | End: 2022-01-03 | Stop reason: SDUPTHER

## 2021-12-29 ENCOUNTER — TELEPHONE (OUTPATIENT)
Dept: ADMINISTRATIVE | Facility: OTHER | Age: 76
End: 2021-12-29

## 2021-12-29 NOTE — TELEPHONE ENCOUNTER
----- Message from Onofre Carrera sent at 12/28/2021  9:43 AM EST -----  Regarding: Eye exam/ [facility]  12/28/21 9:44 AM    Hello, our patient Geri Harvey has had Diabetic Eye Exam completed/performed  Please assist in updating the patient chart by pulling the document from the Media Tab  The date of service is 12/23/2021       Thank you,  Soraida Reed MA  [facility] [address]

## 2022-01-03 DIAGNOSIS — I10 ESSENTIAL HYPERTENSION: ICD-10-CM

## 2022-01-03 RX ORDER — LOSARTAN POTASSIUM 50 MG/1
50 TABLET ORAL DAILY
Qty: 30 TABLET | Refills: 0 | Status: SHIPPED | OUTPATIENT
Start: 2022-01-03 | End: 2022-02-02 | Stop reason: SDUPTHER

## 2022-01-04 LAB
LEFT EYE DIABETIC RETINOPATHY: NORMAL
RIGHT EYE DIABETIC RETINOPATHY: NORMAL

## 2022-01-04 NOTE — TELEPHONE ENCOUNTER
Upon review of the In Basket request we were able to locate, review, and update the patient chart as requested for Diabetic Eye Exam     Any additional questions or concerns should be emailed to the Practice Liaisons via [email or web address] email, please do not reply via In Basket      Thank you  Igor Ruiz

## 2022-02-01 NOTE — PROGRESS NOTES
FAMILY PRACTICE OFFICE VISIT  [facility]       NAME: Ellen Linares  AGE: 69 y o  SEX: female       : 1945        MRN: [id number]    DATE: 2/3/2022  TIME: 5:33 PM    Assessment and Plan     Problem List Items Addressed This Visit        Endocrine    Type 2 diabetes mellitus without complication, without long-term current use of insulin ([address] ) - Primary     Patient declined any medication for her diabetes the increase in her A1c from 6 0% to 7 8% recently  She was referred to Diabetes Education but states she is unsure if she will follow through with this  She has additionally given a prescription for a glucometer and testing supplies, but she again states that she is unsure that she will use these prescriptions  She is currently up-to-date on her foot exam and eye exam   She will follow-up in 3 months  She should call with any concerns in the interim  Lab Results   Component Value Date    HGBA1C 7 8 (A) 2022            Relevant Orders    POCT hemoglobin A1c (Completed)    Ambulatory Referral to Diabetic Education    Glucometer    Glucometer test strips    Lancets    CBC and differential    Comprehensive metabolic panel    Lipid Panel with Direct LDL reflex    TSH, 3rd generation with Free T4 reflex    Hemoglobin A1C    Microalbumin / creatinine urine ratio       Cardiovascular and Mediastinum    Essential hypertension     Well controlled  Continue losartan 50 mg daily and hydrochlorothiazide 25 mg daily           Relevant Medications    hydrochlorothiazide (HYDRODIURIL) 25 mg tablet    losartan (COZAAR) 50 mg tablet    potassium chloride (K-DUR,KLOR-CON) 10 mEq tablet    Other Relevant Orders    CBC and differential    Comprehensive metabolic panel    Lipid Panel with Direct LDL reflex    TSH, 3rd generation with Free T4 reflex    Hemoglobin A1C    Microalbumin / creatinine urine ratio       Musculoskeletal and Integument    Osteoporosis Patient will continue with Dr Eh Slater for her Prolia injections every 6 months  She will be due for her next DEXA scan in October 2022  She will continue also with her calcium and vitamin-D supplementation  Other    Obesity (BMI 30 0-34  9)     BMI Counseling: Body mass index is 32 22 kg/m²  The BMI is above normal  Nutrition recommendations include reducing portion sizes and moderation in carbohydrate intake  Exercise recommendations include exercising 3-5 times per week  Relevant Orders    Comprehensive metabolic panel    Lipid Panel with Direct LDL reflex    TSH, 3rd generation with Free T4 reflex    Hemoglobin A1C    Platelets decreased ([facility] [address] )     Recheck with labs as ordered  Relevant Orders    CBC and differential    Hypokalemia     Currently on potassium chloride 10 meq daily  Recheck labs as ordered today  Relevant Orders    Comprehensive metabolic panel    Low hematocrit     Recheck with labs as ordered  Relevant Orders    CBC and differential      Other Visit Diagnoses     Breast cancer screening by mammogram        Relevant Orders    Mammo screening bilateral w 3d & cad          Type 2 diabetes mellitus without complication, without long-term current use of insulin ([facility] [address] )  Patient declined any medication for her diabetes the increase in her A1c from 6 0% to 7 8% recently  She was referred to Diabetes Education but states she is unsure if she will follow through with this  She has additionally given a prescription for a glucometer and testing supplies, but she again states that she is unsure that she will use these prescriptions  She is currently up-to-date on her foot exam and eye exam   She will follow-up in 3 months  She should call with any concerns in the interim  Lab Results   Component Value Date    HGBA1C 7 8 (A) 02/02/2022       Essential hypertension  Well controlled    Continue losartan 50 mg daily and hydrochlorothiazide 25 mg daily  Osteoporosis  Patient will continue with Dr Rod Davenport for her Prolia injections every 6 months  She will be due for her next DEXA scan in October 2022  She will continue also with her calcium and vitamin-D supplementation  Hypokalemia  Currently on potassium chloride 10 meq daily  Recheck labs as ordered today  Low hematocrit  Recheck with labs as ordered  Obesity (BMI 30 0-34  9)  BMI Counseling: Body mass index is 32 22 kg/m²  The BMI is above normal  Nutrition recommendations include reducing portion sizes and moderation in carbohydrate intake  Exercise recommendations include exercising 3-5 times per week  Platelets decreased ([address] )  Recheck with labs as ordered  Chief Complaint     Chief Complaint   Patient presents with    Follow-up       History of Present Illness   Geri Harvey is a 69y o -year-old female who presents for 6 month follow-up on chronic conditions  The patient presents today for follow-up on diabetes  At the last visit, A1c was 6 0% in July 2021  The patient reports that current diabetic medications include none-simply diet controlled  Blood sugars are not checked  The patient denies hypoglycemic episodes  Last eye exam was 01/04/2022  Last foot exam was 08/2/2021  Today's A1c in the office is 7 8%  The patient reports that current blood pressure medications include losartan 50 mg daily and hydrochlorothiazide 25 mg daily  Blood pressure readings at home are similar to today - today 118/64  The patient denies symptoms of poor control such as chest pain, shortness of breath, leg swelling, vision changes, headaches, or dizziness  The patient reports no caffeine intake and limited salt intake  She has history of osteoporosis  She does continue regularly with Dr Rod Davenport for her Prolia injections every 6 months  She will be due for her next DEXA scan in 10/2022      Since the patent's last visit, weight has increased about 3 pounds since 09/2021  Diet is reported to be high-carb  Exercise occurs 20-25 minutes daily  She has a history of hypokalemia  She is currently on potassium chloride 10 mil equivalents daily  Her most recent labs showed a K of 3 6 in 8/2021  Her leg swelling has been resolved recently  She has a history of a low hematocrit and platelet count  Most recent labs showed these were relatively stable with hematocrit of 33 7 and platelet count of 100  She has a history of low calcium level but is on calcium supplementation  Her most recent labs showed a normal Ca of 10 3 in 8/2021  Review of Systems   Review of Systems   Constitutional: Negative for chills and fever  Respiratory: Negative for shortness of breath  Cardiovascular: Negative for chest pain, palpitations and leg swelling  Neurological: Negative for dizziness and headaches  Active Problem List     Patient Active Problem List   Diagnosis    Dietary calcium deficiency    Obesity (BMI 30 0-34  9)    Osteoporosis    Hx of colonic polyps    Heart murmur    Type 2 diabetes mellitus without complication, without long-term current use of insulin (HCC)    Platelets decreased (HCC)    Essential hypertension    Hypokalemia    Low hematocrit    Leg swelling    Primary generalized (osteo)arthritis    Lumbar spondylosis         Past Medical History:  Past Medical History:   Diagnosis Date    Chicken pox     Fall 5/1/2019    Kidney stones     Malignant neoplasm of skin     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       Past Surgical History:  Past Surgical History:   Procedure Laterality Date    COLONOSCOPY  2018    COLONOSCOPY  2015    COMPLETE; DUE 3 YRS    COLONOSCOPY  06/20/2018    polyp x1    DILATION AND CURETTAGE OF UTERUS  08/2013    THICKENED ENDO ON USG, CVX STENOSIS, PATH: BENIGN POLYP FRAGMENTS AND INACTIVE ENDOMETRIUM   NO NEOPLASIA ; IMPRESSION: 10/27/14; RISA MORRIS    HYSTEROSCOPY      MOHS SURGERY  02/19/2020    BCC on face  TONSILLECTOMY         Family History:  Family History   Problem Relation Age of Onset    Kidney cancer Mother 79        RENAL CANCER    Hypertension Mother     Skin cancer Mother     Osteoporosis Mother     Osteoporotic fracture Mother         hip - age 80     Osteoporotic fracture Paternal Grandmother        Social History:  Social History     Socioeconomic History    Marital status: Single     Spouse name: Not on file    Number of children: Not on file    Years of education: Not on file    Highest education level: Not on file   Occupational History    Occupation: [employer]   Tobacco Use    Smoking status: Former Smoker     Packs/day: 0 50     Years: 5 00     Pack years: 2 50     Quit date: 1970     Years since quittin 1    Smokeless tobacco: Never Used    Tobacco comment: STARTED AT AGE 18  STOPPED AT AGE 26     Vaping Use    Vaping Use: Never used   Substance and Sexual Activity    Alcohol use: Yes     Comment: Special Occassion    Drug use: Never    Sexual activity: Not on file   Other Topics Concern    Not on file   Social History Narrative    EXERCISES MODERATELY LESS THAN 3 TIMES WEEK     Social Determinants of Health     Financial Resource Strain: Not on file   Food Insecurity: Not on file   Transportation Needs: Not on file   Physical Activity: Not on file   Stress: Not on file   Social Connections: Not on file   Intimate Partner Violence: Not on file   Housing Stability: Not on file       Objective     Vitals:    22 1356   BP: 118/70   BP Location: Left arm   Patient Position: Sitting   Cuff Size: Adult   Pulse: 82   Temp: (!) 97 4 °F (36 3 °C)   TempSrc: Temporal   SpO2: 97%   Weight: 74 8 kg (165 lb)   Height: 5' (1 524 m)     Wt Readings from Last 3 Encounters:   22 74 8 kg (165 lb)   21 74 1 kg (163 lb 6 4 oz)   21 73 5 kg (162 lb)       Physical Exam  Vitals reviewed  Constitutional:       General: She is not in acute distress  Appearance: Normal appearance  She is well-developed  She is obese  She is not ill-appearing  HENT:      Head: Normocephalic and atraumatic  Neck:      Thyroid: No thyromegaly  Cardiovascular:      Rate and Rhythm: Normal rate and regular rhythm  Pulses: Normal pulses  Heart sounds: Normal heart sounds  No murmur heard  Comments: No carotid bruits noted  Pulmonary:      Effort: Pulmonary effort is normal       Breath sounds: Normal breath sounds  No wheezing, rhonchi or rales  Musculoskeletal:      Cervical back: Neck supple  Right lower leg: Edema (trace) present  Left lower leg: Edema (trace) present  Lymphadenopathy:      Cervical: No cervical adenopathy  Neurological:      Mental Status: She is alert  Psychiatric:         Mood and Affect: Mood normal          Behavior: Behavior normal          Thought Content:  Thought content normal          Judgment: Judgment normal          Pertinent Laboratory/Diagnostic Studies:  Lab Results   Component Value Date    BUN 13 08/31/2021    CREATININE 0 80 08/31/2021    CALCIUM 10 3 08/31/2021    K 3 6 08/31/2021    CO2 27 08/31/2021     08/31/2021     Lab Results   Component Value Date    ALT 16 08/31/2021    AST 38 (H) 08/31/2021    ALKPHOS 79 08/31/2021       Lab Results   Component Value Date    WBC 4 1 11/09/2021    HGB 11 8 11/09/2021    HCT 33 7 (L) 11/09/2021     7 (H) 11/09/2021     (L) 11/09/2021     Lab Results   Component Value Date    TRIG 74 07/14/2021     Lab Results   Component Value Date    HDL 60 07/14/2021     Lab Results   Component Value Date    LDLCALC 83 07/14/2021     Lab Results   Component Value Date    HGBA1C 7 8 (A) 02/02/2022       Results for orders placed or performed in visit on 02/02/22   POCT hemoglobin A1c   Result Value Ref Range    Hemoglobin A1C 7 8 (A) 6 5       Orders Placed This Encounter   Procedures    Glucometer    Glucometer test strips    Lancets    Mammo screening bilateral w 3d & cad    CBC and differential    Comprehensive metabolic panel    Lipid Panel with Direct LDL reflex    TSH, 3rd generation with Free T4 reflex    Hemoglobin A1C    Microalbumin / creatinine urine ratio    Ambulatory Referral to Diabetic Education    POCT hemoglobin A1c       ALLERGIES:  No Known Allergies    Current Medications     Current Outpatient Medications   Medication Sig Dispense Refill    Calcium Carbonate-Vitamin D (CALCIUM 600+D) 600-200 MG-UNIT TABS Take 1 tablet by mouth 2 (two) times a day      Cholecalciferol (VITAMIN D3) 1000 units CAPS Take by mouth daily        denosumab (PROLIA) 60 mg/mL Inject under the skin every 6 (six) months        hydrochlorothiazide (HYDRODIURIL) 25 mg tablet Take 1 tablet (25 mg total) by mouth daily 30 tablet 5    losartan (COZAAR) 50 mg tablet Take 1 tablet (50 mg total) by mouth daily 30 tablet 5    Multiple Vitamins-Minerals (DAILY MULTI PO) Take by mouth daily        potassium chloride (K-DUR,KLOR-CON) 10 mEq tablet Take 1 tablet (10 mEq total) by mouth daily 30 tablet 5    naproxen (NAPROSYN) 500 mg tablet Take 1 tablet (500 mg total) by mouth 2 (two) times a day with meals for 7 days 14 tablet 0     Current Facility-Administered Medications   Medication Dose Route Frequency Provider Last Rate Last Admin    denosumab (PROLIA) subcutaneous injection 60 mg  60 mg Subcutaneous Q6 Months [employer]   60 mg at 11/01/21 1418         Health Maintenance     Health Maintenance   Topic Date Due    Osteoporosis Screening  Never done    Falls: Plan of Care  Never done    PT PLAN OF CARE  05/17/2018    COVID-19 Vaccine (3 - Booster for Moderna series) 08/09/2021    Breast Cancer Screening: Mammogram  04/20/2022    Medicare Annual Wellness Visit (AWV)  04/26/2022    Diabetic Foot Exam  08/02/2022    HEMOGLOBIN A1C  08/02/2022    DM Eye Exam  01/04/2023    Fall Risk  02/02/2023    Depression Screening  02/02/2023    BMI: Adult 02/02/2023    BMI: Followup Plan  02/03/2023    Colorectal Cancer Screening  07/19/2024    DTaP,Tdap,and Td Vaccines (2 - Td or Tdap) 04/30/2029    Hepatitis C Screening  Completed    Pneumococcal Vaccine: 65+ Years  Completed    Influenza Vaccine  Completed    HIB Vaccine  Aged Out    Hepatitis B Vaccine  Aged Out    IPV Vaccine  Aged Out    Hepatitis A Vaccine  Aged Out    Meningococcal ACWY Vaccine  Aged Out    HPV Vaccine  Aged Out     Immunization History   Administered Date(s) Administered    COVID-19 MODERNA VACC 0 5 ML IM 02/06/2021, 03/09/2021    INFLUENZA 10/24/2007, 10/23/2017    Influenza Split High Dose Preservative Free IM 10/24/2018    Influenza, high dose seasonal 0 7 mL 09/26/2019, 09/24/2021    Influenza, seasonal, injectable 10/24/2007    Pneumococcal Conjugate 13-Valent 10/23/2017    Pneumococcal Polysaccharide PPV23 10/24/2018    Tdap 04/30/2019    Zoster 06/17/2016       Jaycee Nicole PA-C  2/3/2022 5:33 PM  [place] [address] [facility]

## 2022-02-02 ENCOUNTER — OFFICE VISIT (OUTPATIENT)
Dept: FAMILY MEDICINE CLINIC | Facility: CLINIC | Age: 77
End: 2022-02-02
Payer: MEDICARE

## 2022-02-02 VITALS
SYSTOLIC BLOOD PRESSURE: 118 MMHG | HEIGHT: 60 IN | HEART RATE: 82 BPM | TEMPERATURE: 97.4 F | OXYGEN SATURATION: 97 % | BODY MASS INDEX: 32.39 KG/M2 | WEIGHT: 165 LBS | DIASTOLIC BLOOD PRESSURE: 70 MMHG

## 2022-02-02 DIAGNOSIS — E87.6 HYPOKALEMIA: ICD-10-CM

## 2022-02-02 DIAGNOSIS — E11.9 TYPE 2 DIABETES MELLITUS WITHOUT COMPLICATION, WITHOUT LONG-TERM CURRENT USE OF INSULIN (HCC): Primary | ICD-10-CM

## 2022-02-02 DIAGNOSIS — D69.6 PLATELETS DECREASED (HCC): ICD-10-CM

## 2022-02-02 DIAGNOSIS — M81.0 OSTEOPOROSIS, UNSPECIFIED OSTEOPOROSIS TYPE, UNSPECIFIED PATHOLOGICAL FRACTURE PRESENCE: ICD-10-CM

## 2022-02-02 DIAGNOSIS — I10 ESSENTIAL HYPERTENSION: ICD-10-CM

## 2022-02-02 DIAGNOSIS — D64.9 LOW HEMATOCRIT: ICD-10-CM

## 2022-02-02 DIAGNOSIS — E66.9 OBESITY (BMI 30.0-34.9): ICD-10-CM

## 2022-02-02 DIAGNOSIS — Z12.31 BREAST CANCER SCREENING BY MAMMOGRAM: ICD-10-CM

## 2022-02-02 LAB — SL AMB POCT HEMOGLOBIN AIC: 7.8 (ref ?–6.5)

## 2022-02-02 PROCEDURE — 99214 OFFICE O/P EST MOD 30 MIN: CPT | Performed by: PHYSICIAN ASSISTANT

## 2022-02-02 PROCEDURE — 83036 HEMOGLOBIN GLYCOSYLATED A1C: CPT | Performed by: PHYSICIAN ASSISTANT

## 2022-02-02 RX ORDER — POTASSIUM CHLORIDE 750 MG/1
10 TABLET, EXTENDED RELEASE ORAL DAILY
Qty: 30 TABLET | Refills: 5 | Status: SHIPPED | OUTPATIENT
Start: 2022-02-02 | End: 2022-05-13 | Stop reason: SDUPTHER

## 2022-02-02 RX ORDER — LOSARTAN POTASSIUM 50 MG/1
50 TABLET ORAL DAILY
Qty: 30 TABLET | Refills: 5 | Status: SHIPPED | OUTPATIENT
Start: 2022-02-02 | End: 2022-05-13 | Stop reason: SDUPTHER

## 2022-02-02 RX ORDER — HYDROCHLOROTHIAZIDE 25 MG/1
25 TABLET ORAL DAILY
Qty: 30 TABLET | Refills: 5 | Status: SHIPPED | OUTPATIENT
Start: 2022-02-02 | End: 2022-05-13 | Stop reason: SDUPTHER

## 2022-02-02 NOTE — PROGRESS NOTES
Diabetic Foot Exam    Patient's shoes and socks removed  Right Foot/Ankle   Right Foot Inspection  Skin Exam: skin normal and skin intact  No dry skin, no warmth, no callus, no erythema, no maceration, no abnormal color, no pre-ulcer, no ulcer and no callus  Sensory   Monofilament testing: diminished    Vascular  Capillary refills: < 3 seconds  The right DP pulse is 2+  The right PT pulse is 2+  Left Foot/Ankle  Left Foot Inspection  Skin Exam: skin normal and skin intact  No dry skin, no warmth, no erythema, no maceration, normal color, no pre-ulcer, no ulcer and no callus  Toe Exam: ROM and strength within normal limits  Sensory   Monofilament testing: diminished    Vascular  Capillary refills: < 3 seconds  The left DP pulse is 2+  The left PT pulse is 2+       Assign Risk Category  No deformity present  Loss of protective sensation  No weak pulses  Risk: 1

## 2022-02-03 NOTE — ASSESSMENT & PLAN NOTE
Patient will continue with Dr Ramila Kauffman for her Prolia injections every 6 months  She will be due for her next DEXA scan in October 2022  She will continue also with her calcium and vitamin-D supplementation

## 2022-02-03 NOTE — ASSESSMENT & PLAN NOTE
Patient declined any medication for her diabetes the increase in her A1c from 6 0% to 7 8% recently  She was referred to Diabetes Education but states she is unsure if she will follow through with this  She has additionally given a prescription for a glucometer and testing supplies, but she again states that she is unsure that she will use these prescriptions  She is currently up-to-date on her foot exam and eye exam   She will follow-up in 3 months  She should call with any concerns in the interim    Lab Results   Component Value Date    HGBA1C 7 8 (A) 02/02/2022

## 2022-02-03 NOTE — ASSESSMENT & PLAN NOTE
BMI Counseling: Body mass index is 32 22 kg/m²  The BMI is above normal  Nutrition recommendations include reducing portion sizes and moderation in carbohydrate intake  Exercise recommendations include exercising 3-5 times per week

## 2022-04-22 ENCOUNTER — TELEPHONE (OUTPATIENT)
Dept: ADMINISTRATIVE | Facility: OTHER | Age: 77
End: 2022-04-22

## 2022-04-22 NOTE — TELEPHONE ENCOUNTER
----- Message from Dash Poon sent at 4/22/2022 11:55 AM EDT -----  Regarding: Mammo  04/22/22 11:55 AM    Hello, our patient Radha Cali has had Mammogram completed/performed  Please assist in updating the patient chart by pulling the Care Everywhere (CE) document  The date of service is 4/21/22       Thank you,  Dash Poon  [facility]

## 2022-04-23 LAB
ALBUMIN SERPL-MCNC: 3.3 G/DL (ref 3.6–5.1)
ALBUMIN/CREAT UR: 16 MCG/MG CREAT
ALBUMIN/GLOB SERPL: 1.1 (CALC) (ref 1–2.5)
ALP SERPL-CCNC: 79 U/L (ref 37–153)
ALT SERPL-CCNC: 24 U/L (ref 6–29)
AST SERPL-CCNC: 41 U/L (ref 10–35)
BASOPHILS # BLD AUTO: 30 CELLS/UL (ref 0–200)
BASOPHILS NFR BLD AUTO: 0.8 %
BILIRUB SERPL-MCNC: 1.7 MG/DL (ref 0.2–1.2)
BUN SERPL-MCNC: 15 MG/DL (ref 7–25)
BUN/CREAT SERPL: ABNORMAL (CALC) (ref 6–22)
CALCIUM SERPL-MCNC: 10 MG/DL (ref 8.6–10.4)
CHLORIDE SERPL-SCNC: 102 MMOL/L (ref 98–110)
CHOLEST SERPL-MCNC: 157 MG/DL
CHOLEST/HDLC SERPL: 2.6 (CALC)
CO2 SERPL-SCNC: 27 MMOL/L (ref 20–32)
CREAT SERPL-MCNC: 0.76 MG/DL (ref 0.6–0.93)
CREAT UR-MCNC: 146 MG/DL (ref 20–275)
EOSINOPHIL # BLD AUTO: 194 CELLS/UL (ref 15–500)
EOSINOPHIL NFR BLD AUTO: 5.1 %
ERYTHROCYTE [DISTWIDTH] IN BLOOD BY AUTOMATED COUNT: 13.2 % (ref 11–15)
GLOBULIN SER CALC-MCNC: 3.1 G/DL (CALC) (ref 1.9–3.7)
GLUCOSE SERPL-MCNC: 101 MG/DL (ref 65–99)
HBA1C MFR BLD: 6 % OF TOTAL HGB
HCT VFR BLD AUTO: 34.9 % (ref 35–45)
HDLC SERPL-MCNC: 60 MG/DL
HGB BLD-MCNC: 12.1 G/DL (ref 11.7–15.5)
LDLC SERPL CALC-MCNC: 82 MG/DL (CALC)
LYMPHOCYTES # BLD AUTO: 1113 CELLS/UL (ref 850–3900)
LYMPHOCYTES NFR BLD AUTO: 29.3 %
MCH RBC QN AUTO: 34.9 PG (ref 27–33)
MCHC RBC AUTO-ENTMCNC: 34.7 G/DL (ref 32–36)
MCV RBC AUTO: 100.6 FL (ref 80–100)
MICROALBUMIN UR-MCNC: 2.3 MG/DL
MONOCYTES # BLD AUTO: 293 CELLS/UL (ref 200–950)
MONOCYTES NFR BLD AUTO: 7.7 %
NEUTROPHILS # BLD AUTO: 2170 CELLS/UL (ref 1500–7800)
NEUTROPHILS NFR BLD AUTO: 57.1 %
NONHDLC SERPL-MCNC: 97 MG/DL (CALC)
PLATELET # BLD AUTO: ABNORMAL THOUSAND/UL
POTASSIUM SERPL-SCNC: 3.4 MMOL/L (ref 3.5–5.3)
PROT SERPL-MCNC: 6.4 G/DL (ref 6.1–8.1)
RBC # BLD AUTO: 3.47 MILLION/UL (ref 3.8–5.1)
SERVICE CMNT-IMP: ABNORMAL
SL AMB EGFR AFRICAN AMERICAN: 88 ML/MIN/1.73M2
SL AMB EGFR NON AFRICAN AMERICAN: 76 ML/MIN/1.73M2
SODIUM SERPL-SCNC: 136 MMOL/L (ref 135–146)
TRIGL SERPL-MCNC: 69 MG/DL
TSH SERPL-ACNC: 1.62 MIU/L (ref 0.4–4.5)
WBC # BLD AUTO: 3.8 THOUSAND/UL (ref 3.8–10.8)

## 2022-04-25 NOTE — TELEPHONE ENCOUNTER
Upon review of the In Basket request we were able to locate, review, and update the patient chart as requested for Mammogram     Any additional questions or concerns should be emailed to the Practice Liaisons via [email or web address] email, please do not reply via In Basket      Thank you  Krystin Nguyễn MA

## 2022-05-06 ENCOUNTER — RA CDI HCC (OUTPATIENT)
Dept: OTHER | Facility: HOSPITAL | Age: 77
End: 2022-05-06

## 2022-05-06 NOTE — PROGRESS NOTES
[address]  coding opportunities       Chart reviewed, no opportunity found: CHART REVIEWED, NO OPPORTUNITY FOUND        Patients Insurance     Medicare Insurance: Medicare

## 2022-05-13 ENCOUNTER — OFFICE VISIT (OUTPATIENT)
Dept: FAMILY MEDICINE CLINIC | Facility: CLINIC | Age: 77
End: 2022-05-13
Payer: MEDICARE

## 2022-05-13 VITALS
OXYGEN SATURATION: 99 % | DIASTOLIC BLOOD PRESSURE: 76 MMHG | HEART RATE: 68 BPM | BODY MASS INDEX: 30.43 KG/M2 | TEMPERATURE: 98.3 F | SYSTOLIC BLOOD PRESSURE: 124 MMHG | WEIGHT: 155 LBS | HEIGHT: 60 IN

## 2022-05-13 DIAGNOSIS — E11.9 TYPE 2 DIABETES MELLITUS WITHOUT COMPLICATION, WITHOUT LONG-TERM CURRENT USE OF INSULIN (HCC): ICD-10-CM

## 2022-05-13 DIAGNOSIS — D69.6 PLATELETS DECREASED (HCC): ICD-10-CM

## 2022-05-13 DIAGNOSIS — Z00.00 MEDICARE ANNUAL WELLNESS VISIT, SUBSEQUENT: Primary | ICD-10-CM

## 2022-05-13 DIAGNOSIS — Z12.11 SCREENING FOR COLORECTAL CANCER: ICD-10-CM

## 2022-05-13 DIAGNOSIS — I10 ESSENTIAL HYPERTENSION: ICD-10-CM

## 2022-05-13 DIAGNOSIS — E87.6 HYPOKALEMIA: ICD-10-CM

## 2022-05-13 DIAGNOSIS — D64.9 LOW HEMATOCRIT: ICD-10-CM

## 2022-05-13 DIAGNOSIS — Z12.12 SCREENING FOR COLORECTAL CANCER: ICD-10-CM

## 2022-05-13 DIAGNOSIS — E66.9 OBESITY (BMI 30.0-34.9): ICD-10-CM

## 2022-05-13 DIAGNOSIS — M81.0 OSTEOPOROSIS, UNSPECIFIED OSTEOPOROSIS TYPE, UNSPECIFIED PATHOLOGICAL FRACTURE PRESENCE: ICD-10-CM

## 2022-05-13 PROCEDURE — G0439 PPPS, SUBSEQ VISIT: HCPCS | Performed by: PHYSICIAN ASSISTANT

## 2022-05-13 PROCEDURE — 99214 OFFICE O/P EST MOD 30 MIN: CPT | Performed by: PHYSICIAN ASSISTANT

## 2022-05-13 RX ORDER — HYDROCHLOROTHIAZIDE 25 MG/1
25 TABLET ORAL DAILY
Qty: 30 TABLET | Refills: 5 | Status: SHIPPED | OUTPATIENT
Start: 2022-05-13

## 2022-05-13 RX ORDER — LOSARTAN POTASSIUM 50 MG/1
50 TABLET ORAL DAILY
Qty: 30 TABLET | Refills: 5 | Status: SHIPPED | OUTPATIENT
Start: 2022-05-13

## 2022-05-13 RX ORDER — POTASSIUM CHLORIDE 750 MG/1
10 TABLET, EXTENDED RELEASE ORAL DAILY
Qty: 30 TABLET | Refills: 5 | Status: SHIPPED | OUTPATIENT
Start: 2022-05-13

## 2022-05-13 NOTE — PROGRESS NOTES
FAMILY PRACTICE OFFICE VISIT  [facility] - [facility] PRIMARY CARE       NAME: Jovon Aparicio  AGE: 69 y o  SEX: female       : 1945        MRN: [id number]    DATE: 5/15/2022  TIME: 5:27 PM    Assessment and Plan     Problem List Items Addressed This Visit        Endocrine    Type 2 diabetes mellitus without complication, without long-term current use of insulin ([address] )     Significantly improved  Patient will continue her improved diet  No need for diabetes medication currently  She is up-to-date on eye and foot exam  Recheck labs prior to next visit in 6 months  Lab Results   Component Value Date    HGBA1C 6 0 (H) 2022              Relevant Orders    Comprehensive metabolic panel    Hemoglobin A1C    CBC and differential       Cardiovascular and Mediastinum    Essential hypertension     Controlled  Continue HCTZ 25 mg daily and losartan 50 mg daily  Will continue to monitor  Relevant Medications    hydrochlorothiazide (HYDRODIURIL) 25 mg tablet    losartan (COZAAR) 50 mg tablet    potassium chloride (K-DUR,KLOR-CON) 10 mEq tablet    Other Relevant Orders    Comprehensive metabolic panel    Hemoglobin A1C    CBC and differential       Musculoskeletal and Integument    Osteoporosis     Continue with Dr Sandoval Simmons regarding her Prolia injections  She is due to update her DEXA in 10/2022  She will continue with her calcium and vitamin D supplementation as well  Other    Obesity (BMI 30 0-34  9)     Improving  Continue improved diet  Will continue to monitor  BMI Counseling: Body mass index is 30 78 kg/m²  The BMI is above normal  Nutrition recommendations include reducing portion sizes  Exercise recommendations include exercising 3-5 times per week  Platelets decreased ([address] )     Stable  Continue to monitor  Hypokalemia     Continue KCl 10 mew daily  Recheck with next labs              Low hematocrit     Will continue to monitor  Other Visit Diagnoses     Medicare annual wellness visit, subsequent    -  Primary    Screening for colorectal cancer              Type 2 diabetes mellitus without complication, without long-term current use of insulin ([facility] [address] )  Significantly improved  Patient will continue her improved diet  No need for diabetes medication currently  She is up-to-date on eye and foot exam  Recheck labs prior to next visit in 6 months  Lab Results   Component Value Date    HGBA1C 6 0 (H) 04/22/2022       Essential hypertension  Controlled  Continue HCTZ 25 mg daily and losartan 50 mg daily  Will continue to monitor  Osteoporosis  Continue with Dr Wiley Covarrubias regarding her Prolia injections  She is due to update her DEXA in 10/2022  She will continue with her calcium and vitamin D supplementation as well  Obesity (BMI 30 0-34  9)  Improving  Continue improved diet  Will continue to monitor  BMI Counseling: Body mass index is 30 78 kg/m²  The BMI is above normal  Nutrition recommendations include reducing portion sizes  Exercise recommendations include exercising 3-5 times per week  Hypokalemia  Continue KCl 10 mew daily  Recheck with next labs  Low hematocrit  Will continue to monitor  Platelets decreased ([facility] [address] )  Stable  Continue to monitor  Chief Complaint     Chief Complaint   Patient presents with    Medicare Wellness Visit       History of Present Illness   Igor Brewer is a 69y o -year-old female who presents for AWV and chronic condition follow-up  The patient presents today for follow-up on diabetes  At the last visit, A1c was 7 8% on 2/2/22  She is not on diabetic medication - has stopped cake, candy and bread  Blood sugars are not checked at home  The patient denies hypoglycemic episodes  Last eye exam was 1/4/22  Last foot exam was 2/3/22  Last month, her A1c decreased back to 6 0%       The patient reports that current blood pressure medications include losartan 50 mg daily and hydrochlorothiazide 25 mg daily  Blood pressure readings at home are not checked  The patient denies symptoms of poor control such as chest pain, shortness of breath, leg swelling, vision changes, headaches, or dizziness  Patient has osteoporosis and does continue with Dr Matt Kenney for her Prolia injections every 6 months  She will be due for her next DEXA scan in October 2022  She does also continue with her calcium and vitamin-D supplementation  Patient has a hx of hypokalemia  Last labs showed 3 4  She has a hx of low hct as well as low platelets  Last labs showed Hct 34 9 and Plt 116  Since the patent's last visit, weight has decreased about 10 pounds in 3 months  Diet is reported to be low carb as above  Exercise occurs daily for 1-2 miles of walking outside or on a treadmill  Review of Systems   Review of Systems   Constitutional: Negative for chills and fever  HENT: Negative for rhinorrhea and sore throat  Eyes: Negative for visual disturbance  Respiratory: Negative for cough, shortness of breath and wheezing  Cardiovascular: Negative for chest pain, palpitations and leg swelling  Gastrointestinal: Negative for abdominal pain, constipation, diarrhea, nausea and vomiting  Endocrine: Negative for polydipsia and polyuria  Genitourinary: Negative for dysuria and frequency  Musculoskeletal: Positive for back pain (chronic)  Negative for arthralgias and myalgias  Skin: Negative for rash  Neurological: Negative for dizziness, syncope and headaches  Hematological: Does not bruise/bleed easily  Psychiatric/Behavioral: Negative for dysphoric mood  The patient is not nervous/anxious  Active Problem List     Patient Active Problem List   Diagnosis    Dietary calcium deficiency    Obesity (BMI 30 0-34  9)    Osteoporosis    Hx of colonic polyps    Heart murmur    Type 2 diabetes mellitus without complication, without long-term current use of insulin (HCC)    Platelets decreased ([facility] [address] )    Essential hypertension    Hypokalemia    Low hematocrit    Leg swelling    Primary generalized (osteo)arthritis    Lumbar spondylosis         Past Medical History:  Past Medical History:   Diagnosis Date    Chicken pox     Fall 2019    Kidney stones     Malignant neoplasm of skin     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       Past Surgical History:  Past Surgical History:   Procedure Laterality Date    COLONOSCOPY      COLONOSCOPY      COMPLETE; DUE 3 YRS    COLONOSCOPY  2018    polyp x1    DILATION AND CURETTAGE OF UTERUS  2013    THICKENED ENDO ON USG, CVX STENOSIS, PATH: BENIGN POLYP FRAGMENTS AND INACTIVE ENDOMETRIUM  NO NEOPLASIA ; IMPRESSION: 10/27/14; [facility]    HYSTEROSCOPY      MOHS SURGERY  2020    BCC on face    TONSILLECTOMY         Family History:  Family History   Problem Relation Age of Onset    Kidney cancer Mother 79        RENAL CANCER    Hypertension Mother     Skin cancer Mother     Osteoporosis Mother     Osteoporotic fracture Mother         hip - age 80     Osteoporotic fracture Paternal Grandmother        Social History:  Social History     Socioeconomic History    Marital status: Single     Spouse name: Not on file    Number of children: Not on file    Years of education: Not on file    Highest education level: Not on file   Occupational History    Occupation: [employer]   Tobacco Use    Smoking status: Former Smoker     Packs/day: 0 50     Years: 5 00     Pack years: 2 50     Quit date: 1970     Years since quittin 4    Smokeless tobacco: Never Used    Tobacco comment: STARTED AT AGE 18   STOPPED AT AGE 26     Vaping Use    Vaping Use: Never used   Substance and Sexual Activity    Alcohol use: Yes     Comment: Wine for Special Occassion    Drug use: Never    Sexual activity: Not on file   Other Topics Concern    Not on file   Social History Narrative    EXERCISES MODERATELY LESS THAN 3 TIMES WEEK     Social Determinants of Health     Financial Resource Strain: Not on file   Food Insecurity: Not on file   Transportation Needs: Not on file   Physical Activity: Not on file   Stress: Not on file   Social Connections: Not on file   Intimate Partner Violence: Not on file   Housing Stability: Not on file       Objective     Vitals:    05/13/22 1252   BP: 124/76   BP Location: Left arm   Patient Position: Sitting   Cuff Size: Adult   Pulse: 68   Temp: 98 3 °F (36 8 °C)   TempSrc: Temporal   SpO2: 99%   Weight: 70 3 kg (155 lb)   Height: 4' 11 5" (1 511 m)     Wt Readings from Last 3 Encounters:   05/13/22 70 3 kg (155 lb)   05/04/22 70 2 kg (154 lb 12 8 oz)   02/02/22 74 8 kg (165 lb)       Physical Exam  Vitals reviewed  Constitutional:       General: She is not in acute distress  Appearance: Normal appearance  She is well-developed  She is obese  She is not ill-appearing  HENT:      Head: Normocephalic and atraumatic  Right Ear: Hearing, tympanic membrane, ear canal and external ear normal       Left Ear: Hearing, tympanic membrane, ear canal and external ear normal       Nose: Nose normal    Eyes:      General: Lids are normal       Conjunctiva/sclera: Conjunctivae normal       Pupils: Pupils are equal, round, and reactive to light  Neck:      Thyroid: No thyroid mass or thyromegaly  Vascular: No carotid bruit  Trachea: Trachea normal    Cardiovascular:      Rate and Rhythm: Normal rate and regular rhythm  Pulses: Normal pulses  Radial pulses are 2+ on the right side and 2+ on the left side  Posterior tibial pulses are 2+ on the right side and 2+ on the left side  Heart sounds: Normal heart sounds, S1 normal and S2 normal  No murmur heard  Pulmonary:      Effort: Pulmonary effort is normal       Breath sounds: Normal breath sounds  No decreased breath sounds, wheezing, rhonchi or rales     Abdominal:      General: Bowel sounds are normal  There is no distension  Palpations: Abdomen is soft  There is no mass  Tenderness: There is no abdominal tenderness  Hernia: No hernia is present  Musculoskeletal:         General: Normal range of motion  Cervical back: Normal range of motion and neck supple  Right lower leg: No edema  Left lower leg: No edema  Lymphadenopathy:      Cervical: No cervical adenopathy  Skin:     General: Skin is warm and dry  Findings: No rash  Neurological:      Mental Status: She is alert  Sensory: No sensory deficit (light touch sensation intact and equal in UE and LE bilaterally)  Psychiatric:         Mood and Affect: Mood normal          Behavior: Behavior normal          Thought Content:  Thought content normal          Judgment: Judgment normal          Pertinent Laboratory/Diagnostic Studies:  Lab Results   Component Value Date    BUN 15 04/22/2022    CREATININE 0 76 04/22/2022    CALCIUM 10 0 04/22/2022    K 3 4 (L) 04/22/2022    CO2 27 04/22/2022     04/22/2022     Lab Results   Component Value Date    ALT 24 04/22/2022    AST 41 (H) 04/22/2022    ALKPHOS 79 04/22/2022       Lab Results   Component Value Date    WBC 3 8 04/22/2022    HGB 12 1 04/22/2022    HCT 34 9 (L) 04/22/2022     6 (H) 04/22/2022    PLT TNP 04/22/2022       Lab Results   Component Value Date    TRIG 69 04/22/2022     Lab Results   Component Value Date    HDL 60 04/22/2022     Lab Results   Component Value Date    LDLCALC 82 04/22/2022     Lab Results   Component Value Date    HGBA1C 6 0 (H) 04/22/2022       Results for orders placed or performed in visit on 04/22/22   Lipid Panel with Direct LDL reflex   Result Value Ref Range    Total Cholesterol 157 <200 mg/dL    HDL 60 > OR = 50 mg/dL    Triglycerides 69 <150 mg/dL    LDL Calculated 82 mg/dL (calc)    Chol HDLC Ratio 2 6 <5 0 (calc)    Non-HDL Cholesterol 97 <130 mg/dL (calc)   Microalbumin, Random Urine (W/Creatinine)   Result Value Ref Range    Creatinine, Urine 146 20 - 275 mg/dL    Microalbum  ,U,Random 2 3 See Note: mg/dL    Microalb/Creat Ratio 16 <30 mcg/mg creat   Comprehensive metabolic panel   Result Value Ref Range    Glucose, Random 101 (H) 65 - 99 mg/dL    BUN 15 7 - 25 mg/dL    Creatinine 0 76 0 60 - 0 93 mg/dL    eGFR Non  76 > OR = 60 mL/min/1 73m2    eGFR  88 > OR = 60 mL/min/1 73m2    SL AMB BUN/CREATININE RATIO NOT APPLICABLE 6 - 22 (calc)    Sodium 136 135 - 146 mmol/L    Potassium 3 4 (L) 3 5 - 5 3 mmol/L    Chloride 102 98 - 110 mmol/L    CO2 27 20 - 32 mmol/L    Calcium 10 0 8 6 - 10 4 mg/dL    Protein, Total 6 4 6 1 - 8 1 g/dL    Albumin 3 3 (L) 3 6 - 5 1 g/dL    Globulin 3 1 1 9 - 3 7 g/dL (calc)    Albumin/Globulin Ratio 1 1 1 0 - 2 5 (calc)    TOTAL BILIRUBIN 1 7 (H) 0 2 - 1 2 mg/dL    Alkaline Phosphatase 79 37 - 153 U/L    AST 41 (H) 10 - 35 U/L    ALT 24 6 - 29 U/L   CBC and differential   Result Value Ref Range    White Blood Cell Count 3 8 3 8 - 10 8 Thousand/uL    Red Blood Cell Count 3 47 (L) 3 80 - 5 10 Million/uL    Hemoglobin 12 1 11 7 - 15 5 g/dL    HCT 34 9 (L) 35 0 - 45 0 %     6 (H) 80 0 - 100 0 fL    MCH 34 9 (H) 27 0 - 33 0 pg    MCHC 34 7 32 0 - 36 0 g/dL    RDW 13 2 11 0 - 15 0 %    Neutrophils (Absolute) 2,170 1,500 - 7,800 cells/uL    Lymphocytes (Absolute) 1,113 850 - 3,900 cells/uL    Monocytes (Absolute) 293 200 - 950 cells/uL    Eosinophils (Absolute) 194 15 - 500 cells/uL    Basophils ABS 30 0 - 200 cells/uL    Neutrophils 57 1 %    Lymphocytes 29 3 %    Monocytes 7 7 %    Eosinophils 5 1 %    Basophils PCT 0 8 %    Comment(s) Hypersegmented neutrophils noted     Platelet Count TNP Thousand/uL   TSH, 3rd generation with Free T4 reflex   Result Value Ref Range    TSH W/RFX TO FREE T4 1 62 0 40 - 4 50 mIU/L   Hemoglobin A1c (w/out EAG)   Result Value Ref Range    Hemoglobin A1C 6 0 (H) <5 7 % of total Hgb         ALLERGIES:  No Known Allergies    Current Medications     Current Outpatient Medications   Medication Sig Dispense Refill    Calcium Carbonate-Vitamin D 600-200 MG-UNIT TABS Take 1 tablet by mouth 2 (two) times a day      Cholecalciferol (VITAMIN D3) 1000 units CAPS Take by mouth daily        denosumab (PROLIA) 60 mg/mL Inject under the skin every 6 (six) months        hydrochlorothiazide (HYDRODIURIL) 25 mg tablet Take 1 tablet (25 mg total) by mouth in the morning  30 tablet 5    losartan (COZAAR) 50 mg tablet Take 1 tablet (50 mg total) by mouth in the morning  30 tablet 5    Multiple Vitamins-Minerals (DAILY MULTI PO) Take by mouth daily        potassium chloride (K-DUR,KLOR-CON) 10 mEq tablet Take 1 tablet (10 mEq total) by mouth in the morning  30 tablet 5     No current facility-administered medications for this visit           Health Maintenance     Health Maintenance   Topic Date Due    Osteoporosis Screening  Never done    PT PLAN OF CARE  05/17/2018    DTaP,Tdap,and Td Vaccines (1 - Tdap) 04/30/2019    COVID-19 Vaccine (3 - Booster for Moderna series) 08/09/2021    HEMOGLOBIN A1C  10/22/2022    DM Eye Exam  01/04/2023    Diabetic Foot Exam  02/03/2023    Breast Cancer Screening: Mammogram  04/21/2023    Fall Risk  05/13/2023    Depression Screening  05/13/2023    Medicare Annual Wellness Visit (AWV)  05/13/2023    BMI: Adult  05/13/2023    BMI: Followup Plan  05/15/2023    Colorectal Cancer Screening  07/19/2024    Hepatitis C Screening  Completed    Pneumococcal Vaccine: 65+ Years  Completed    Influenza Vaccine  Completed    HIB Vaccine  Aged Out    Hepatitis B Vaccine  Aged Out    IPV Vaccine  Aged Out    Hepatitis A Vaccine  Aged Out    Meningococcal ACWY Vaccine  Aged Out    HPV Vaccine  Aged Out     Immunization History   Administered Date(s) Administered    COVID-19 MODERNA VACC 0 5 ML IM 02/06/2021, 03/09/2021    INFLUENZA 10/24/2007, 10/23/2017    Influenza Split High Dose Preservative Free IM 10/24/2018    Influenza, high dose seasonal 0 7 mL 09/26/2019, 09/24/2021    Influenza, seasonal, injectable 10/24/2007    Pneumococcal Conjugate 13-Valent 10/23/2017    Pneumococcal Polysaccharide PPV23 10/24/2018    Tdap 04/30/2019    Zoster 06/17/2016       Duran Basurto PA-C  5/15/2022 5:27 PM  [place] [id number] Primary [facility]

## 2022-05-13 NOTE — PROGRESS NOTES
Again   Assessment and Plan:     Problem List Items Addressed This Visit        Endocrine    Type 2 diabetes mellitus without complication, without long-term current use of insulin ([address] )     Significantly improved  Patient will continue her improved diet  No need for diabetes medication currently  She is up-to-date on eye and foot exam  Recheck labs prior to next visit in 6 months  Lab Results   Component Value Date    HGBA1C 6 0 (H) 04/22/2022              Relevant Orders    Comprehensive metabolic panel    Hemoglobin A1C    CBC and differential       Cardiovascular and Mediastinum    Essential hypertension     Controlled  Continue HCTZ 25 mg daily and losartan 50 mg daily  Will continue to monitor  Relevant Medications    hydrochlorothiazide (HYDRODIURIL) 25 mg tablet    losartan (COZAAR) 50 mg tablet    potassium chloride (K-DUR,KLOR-CON) 10 mEq tablet    Other Relevant Orders    Comprehensive metabolic panel    Hemoglobin A1C    CBC and differential       Musculoskeletal and Integument    Osteoporosis     Continue with Dr Leela Moya regarding her Prolia injections  She is due to update her DEXA in 10/2022  She will continue with her calcium and vitamin D supplementation as well  Other    Obesity (BMI 30 0-34  9)     Improving  Continue improved diet  Will continue to monitor  BMI Counseling: Body mass index is 30 78 kg/m²  The BMI is above normal  Nutrition recommendations include reducing portion sizes  Exercise recommendations include exercising 3-5 times per week  Platelets decreased ([address] )     Stable  Continue to monitor  Hypokalemia     Continue KCl 10 mew daily  Recheck with next labs  Low hematocrit     Will continue to monitor               Other Visit Diagnoses     Medicare annual wellness visit, subsequent    -  Primary    Screening for colorectal cancer               Preventive health issues were discussed with patient, and age appropriate screening tests were ordered as noted in patient's After Visit Summary  Personalized health advice and appropriate referrals for health education or preventive services given if needed, as noted in patient's After Visit Summary  History of Present Illness:     Patient presents for Medicare Annual Wellness visit    Patient Care Team:  Milena Pinon PA-C as PCP - General (Family Medicine)  MD Ирина Troncoso MD     Problem List:     Patient Active Problem List   Diagnosis    Dietary calcium deficiency    Obesity (BMI 30 0-34  9)    Osteoporosis    Hx of colonic polyps    Heart murmur    Type 2 diabetes mellitus without complication, without long-term current use of insulin (HCC)    Platelets decreased (HCC)    Essential hypertension    Hypokalemia    Low hematocrit    Leg swelling    Primary generalized (osteo)arthritis    Lumbar spondylosis      Past Medical and Surgical History:     Past Medical History:   Diagnosis Date    Chicken pox     Fall 5/1/2019    Kidney stones     Malignant neoplasm of skin     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     Past Surgical History:   Procedure Laterality Date    COLONOSCOPY  2018    COLONOSCOPY  2015    COMPLETE; DUE 3 YRS    COLONOSCOPY  06/20/2018    polyp x1    DILATION AND CURETTAGE OF UTERUS  08/2013    THICKENED ENDO ON USG, CVX STENOSIS, PATH: BENIGN POLYP FRAGMENTS AND INACTIVE ENDOMETRIUM   NO NEOPLASIA ; IMPRESSION: 10/27/14; [facility]    HYSTEROSCOPY      MOHS SURGERY  02/19/2020    BCC on face    TONSILLECTOMY        Family History:     Family History   Problem Relation Age of Onset    Kidney cancer Mother 79        RENAL CANCER    Hypertension Mother     Skin cancer Mother     Osteoporosis Mother     Osteoporotic fracture Mother         hip - age 80     Osteoporotic fracture Paternal Grandmother       Social History:     Social History     Socioeconomic History    Marital status: Single     Spouse name: None    Number of children: None    Years of education: None    Highest education level: None   Occupational History    Occupation: [employer]   Tobacco Use    Smoking status: Former Smoker     Packs/day: 0 50     Years: 5 00     Pack years: 2 50     Quit date: 1970     Years since quittin 4    Smokeless tobacco: Never Used    Tobacco comment: STARTED AT AGE 17  STOPPED AT AGE 26     Vaping Use    Vaping Use: Never used   Substance and Sexual Activity    Alcohol use: Yes     Comment: Wine for Special Occassion    Drug use: Never    Sexual activity: None   Other Topics Concern    None   Social History Narrative    EXERCISES MODERATELY LESS THAN 3 TIMES WEEK     Social Determinants of Health     Financial Resource Strain: Not on file   Food Insecurity: Not on file   Transportation Needs: Not on file   Physical Activity: Not on file   Stress: Not on file   Social Connections: Not on file   Intimate Partner Violence: Not on file   Housing Stability: Not on file      Medications and Allergies:     Current Outpatient Medications   Medication Sig Dispense Refill    Calcium Carbonate-Vitamin D 600-200 MG-UNIT TABS Take 1 tablet by mouth 2 (two) times a day      Cholecalciferol (VITAMIN D3) 1000 units CAPS Take by mouth daily        denosumab (PROLIA) 60 mg/mL Inject under the skin every 6 (six) months        hydrochlorothiazide (HYDRODIURIL) 25 mg tablet Take 1 tablet (25 mg total) by mouth in the morning  30 tablet 5    losartan (COZAAR) 50 mg tablet Take 1 tablet (50 mg total) by mouth in the morning  30 tablet 5    Multiple Vitamins-Minerals (DAILY MULTI PO) Take by mouth daily        potassium chloride (K-DUR,KLOR-CON) 10 mEq tablet Take 1 tablet (10 mEq total) by mouth in the morning  30 tablet 5     No current facility-administered medications for this visit       No Known Allergies   Immunizations:     Immunization History   Administered Date(s) Administered    COVID-19 MODERNA VACC 0 5 ML IM 02/06/2021, 03/09/2021    INFLUENZA 10/24/2007, 10/23/2017    Influenza Split High Dose Preservative Free IM 10/24/2018    Influenza, high dose seasonal 0 7 mL 09/26/2019, 09/24/2021    Influenza, seasonal, injectable 10/24/2007    Pneumococcal Conjugate 13-Valent 10/23/2017    Pneumococcal Polysaccharide PPV23 10/24/2018    Tdap 04/30/2019    Zoster 06/17/2016      Health Maintenance:         Topic Date Due    Breast Cancer Screening: Mammogram  04/21/2023    Colorectal Cancer Screening  07/19/2024    Hepatitis C Screening  Completed         Topic Date Due    DTaP,Tdap,and Td Vaccines (1 - Tdap) 04/30/2019    COVID-19 Vaccine (3 - Booster for Moderna series) 08/09/2021      Medicare Health Risk Assessment:     /76 (BP Location: Left arm, Patient Position: Sitting, Cuff Size: Adult)   Pulse 68   Temp 98 3 °F (36 8 °C) (Temporal)   Ht 4' 11 5" (1 511 m)   Wt 70 3 kg (155 lb)   SpO2 99%   BMI 30 78 kg/m²      Ruth Witt is here for her Subsequent Wellness visit  Health Risk Assessment:   Patient rates overall health as excellent  Patient feels that their physical health rating is much better  Patient is very satisfied with their life  Eyesight was rated as same  Hearing was rated as same  Patient feels that their emotional and mental health rating is much better  Patients states they are never, rarely angry  Patient states they are never, rarely unusually tired/fatigued  Pain experienced in the last 7 days has been none  Patient states that she has experienced no weight loss or gain in last 6 months  Depression Screening:   PHQ-2 Score: 0      Fall Risk Screening: In the past year, patient has experienced: no history of falling in past year      Urinary Incontinence Screening:   Patient has not leaked urine accidently in the last six months  Home Safety:  Patient does not have trouble with stairs inside or outside of their home   Patient has working smoke alarms and has working carbon monoxide detector  Home safety hazards include: none  Nutrition:   Current diet is Regular  Medications:   Patient is not currently taking any over-the-counter supplements  Patient is able to manage medications  Activities of Daily Living (ADLs)/Instrumental Activities of Daily Living (IADLs):   Walk and transfer into and out of bed and chair?: Yes  Dress and groom yourself?: Yes    Bathe or shower yourself?: Yes    Feed yourself? Yes  Do your laundry/housekeeping?: Yes  Manage your money, pay your bills and track your expenses?: Yes  Make your own meals?: Yes    Do your own shopping?: Yes    Previous Hospitalizations:   Any hospitalizations or ED visits within the last 12 months?: No      Advance Care Planning:   Living will: No    Durable POA for healthcare: Yes    Advanced directive: No      Cognitive Screening:   Provider or family/friend/caregiver concerned regarding cognition?: No    PREVENTIVE SCREENINGS      Cardiovascular Screening:    General: Screening Current      Diabetes Screening:     General: Screening Not Indicated and History Diabetes      Colorectal Cancer Screening:     General: Screening Current      Breast Cancer Screening:     General: Screening Current      Cervical Cancer Screening:    General: Screening Not Indicated      Osteoporosis Screening:    General: Screening Not Indicated and History Osteoporosis      Lung Cancer Screening:     General: Screening Not Indicated      Hepatitis C Screening:    General: Screening Current    Screening, Brief Intervention, and Referral to Treatment (SBIRT)    Screening  Typical number of drinks in a day: 0  Typical number of drinks in a week: 0  Interpretation: Low risk drinking behavior      Single Item Drug Screening:  How often have you used an illegal drug (including marijuana) or a prescription medication for non-medical reasons in the past year? never    Single Item Drug Screen Score: 0  Interpretation: Negative screen for possible drug use disorder      Tammy Chase PA-C

## 2022-05-13 NOTE — PATIENT INSTRUCTIONS
Medicare Preventive Visit Patient Instructions  Thank you for completing your Welcome to Medicare Visit or Medicare Annual Wellness Visit today  Your next wellness visit will be due in one year (5/14/2023)  The screening/preventive services that you may require over the next 5-10 years are detailed below  Some tests may not apply to you based off risk factors and/or age  Screening tests ordered at today's visit but not completed yet may show as past due  Also, please note that scanned in results may not display below  Preventive Screenings:  Service Recommendations Previous Testing/Comments   Colorectal Cancer Screening  * Colonoscopy    * Fecal Occult Blood Test (FOBT)/Fecal Immunochemical Test (FIT)  * Fecal DNA/Cologuard Test  * Flexible Sigmoidoscopy Age: 54-65 years old   Colonoscopy: every 10 years (may be performed more frequently if at higher risk)  OR  FOBT/FIT: every 1 year  OR  Cologuard: every 3 years  OR  Sigmoidoscopy: every 5 years  Screening may be recommended earlier than age 48 if at higher risk for colorectal cancer  Also, an individualized decision between you and your healthcare provider will decide whether screening between the ages of 74-80 would be appropriate  Colonoscopy: 07/20/2021  FOBT/FIT: Not on file  Cologuard: Not on file  Sigmoidoscopy: Not on file          Breast Cancer Screening Age: 36 years old  Frequency: every 1-2 years  Not required if history of left and right mastectomy Mammogram: 04/21/2022        Cervical Cancer Screening Between the ages of 21-29, pap smear recommended once every 3 years  Between the ages of 33-67, can perform pap smear with HPV co-testing every 5 years     Recommendations may differ for women with a history of total hysterectomy, cervical cancer, or abnormal pap smears in past  Pap Smear: Not on file        Hepatitis C Screening Once for adults born between [facility]  More frequently in patients at high risk for Hepatitis C Hep C Antibody: 04/24/2018        Diabetes Screening 1-2 times per year if you're at risk for diabetes or have pre-diabetes Fasting glucose: No results in last 5 years   A1C: 6 0 % of total Hgb        Cholesterol Screening Once every 5 years if you don't have a lipid disorder  May order more often based on risk factors  Lipid panel: 04/22/2022          Other Preventive Screenings Covered by Medicare:  1  Abdominal Aortic Aneurysm (AAA) Screening: covered once if your at risk  You're considered to be at risk if you have a family history of AAA  2  Lung Cancer Screening: covers low dose CT scan once per year if you meet all of the following conditions: (1) Age 50-69; (2) No signs or symptoms of lung cancer; (3) Current smoker or have quit smoking within the last 15 years; (4) You have a tobacco smoking history of at least 30 pack years (packs per day multiplied by number of years you smoked); (5) You get a written order from a healthcare provider  3  Glaucoma Screening: covered annually if you're considered high risk: (1) You have diabetes OR (2) Family history of glaucoma OR (3)  aged 48 and older OR (3)  American aged 72 and older  3  Osteoporosis Screening: covered every 2 years if you meet one of the following conditions: (1) You're estrogen deficient and at risk for osteoporosis based off medical history and other findings; (2) Have a vertebral abnormality; (3) On glucocorticoid therapy for more than 3 months; (4) Have primary hyperparathyroidism; (5) On osteoporosis medications and need to assess response to drug therapy  · Last bone density test (DXA Scan): Not on file  5  HIV Screening: covered annually if you're between the age of 12-76  Also covered annually if you are younger than 13 and older than 72 with risk factors for HIV infection  For pregnant patients, it is covered up to 3 times per pregnancy      Immunizations:  Immunization Recommendations   Influenza Vaccine Annual influenza vaccination during flu season is recommended for all persons aged >= 6 months who do not have contraindications   Pneumococcal Vaccine (Prevnar and Pneumovax)  * Prevnar = PCV13  * Pneumovax = PPSV23   Adults 25-60 years old: 1-3 doses may be recommended based on certain risk factors  Adults 72 years old: Prevnar (PCV13) vaccine recommended followed by Pneumovax (PPSV23) vaccine  If already received PPSV23 since turning 65, then PCV13 recommended at least one year after PPSV23 dose  Hepatitis B Vaccine 3 dose series if at intermediate or high risk (ex: diabetes, end stage renal disease, liver disease)   Tetanus (Td) Vaccine - COST NOT COVERED BY MEDICARE PART B Following completion of primary series, a booster dose should be given every 10 years to maintain immunity against tetanus  Td may also be given as tetanus wound prophylaxis  Tdap Vaccine - COST NOT COVERED BY MEDICARE PART B Recommended at least once for all adults  For pregnant patients, recommended with each pregnancy  Shingles Vaccine (Shingrix) - COST NOT COVERED BY MEDICARE PART B  2 shot series recommended in those aged 48 and above     Health Maintenance Due:      Topic Date Due    Breast Cancer Screening: Mammogram  04/21/2023    Colorectal Cancer Screening  07/19/2024    Hepatitis C Screening  Completed     Immunizations Due:      Topic Date Due    DTaP,Tdap,and Td Vaccines (1 - Tdap) 04/30/2019    COVID-19 Vaccine (3 - Booster for Jacquiline Genta series) 08/09/2021     Advance Directives   What are advance directives? Advance directives are legal documents that state your wishes and plans for medical care  These plans are made ahead of time in case you lose your ability to make decisions for yourself  Advance directives can apply to any medical decision, such as the treatments you want, and if you want to donate organs  What are the types of advance directives?   There are many types of advance directives, and each state has rules about how to use them  You may choose a combination of any of the following:  · Living will: This is a written record of the treatment you want  You can also choose which treatments you do not want, which to limit, and which to stop at a certain time  This includes surgery, medicine, IV fluid, and tube feedings  · Durable power of  for healthcare [place] [facility]): This is a written record that states who you want to make healthcare choices for you when you are unable to make them for yourself  This person, called a proxy, is usually a family member or a friend  You may choose more than 1 proxy  · Do not resuscitate (DNR) order:  A DNR order is used in case your heart stops beating or you stop breathing  It is a request not to have certain forms of treatment, such as CPR  A DNR order may be included in other types of advance directives  · Medical directive: This covers the care that you want if you are in a coma, near death, or unable to make decisions for yourself  You can list the treatments you want for each condition  Treatment may include pain medicine, surgery, blood transfusions, dialysis, IV or tube feedings, and a ventilator (breathing machine)  · Values history: This document has questions about your views, beliefs, and how you feel and think about life  This information can help others choose the care that you would choose  Why are advance directives important? An advance directive helps you control your care  Although spoken wishes may be used, it is better to have your wishes written down  Spoken wishes can be misunderstood, or not followed  Treatments may be given even if you do not want them  An advance directive may make it easier for your family to make difficult choices about your care  Weight Management   Why it is important to manage your weight:  Being overweight increases your risk of health conditions such as heart disease, high blood pressure, type 2 diabetes, and certain types of cancer   It can also increase your risk for osteoarthritis, sleep apnea, and other respiratory problems  Aim for a slow, steady weight loss  Even a small amount of weight loss can lower your risk of health problems  How to lose weight safely:  A safe and healthy way to lose weight is to eat fewer calories and get regular exercise  You can lose up about 1 pound a week by decreasing the number of calories you eat by 500 calories each day  Healthy meal plan for weight management:  A healthy meal plan includes a variety of foods, contains fewer calories, and helps you stay healthy  A healthy meal plan includes the following:  · Eat whole-grain foods more often  A healthy meal plan should contain fiber  Fiber is the part of grains, fruits, and vegetables that is not broken down by your body  Whole-grain foods are healthy and provide extra fiber in your diet  Some examples of whole-grain foods are whole-wheat breads and pastas, oatmeal, brown rice, and bulgur  · Eat a variety of vegetables every day  Include dark, leafy greens such as spinach, kale, radha greens, and mustard greens  Eat yellow and orange vegetables such as carrots, sweet potatoes, and winter squash  · Eat a variety of fruits every day  Choose fresh or canned fruit (canned in its own juice or light syrup) instead of juice  Fruit juice has very little or no fiber  · Eat low-fat dairy foods  Drink fat-free (skim) milk or 1% milk  Eat fat-free yogurt and low-fat cottage cheese  Try low-fat cheeses such as mozzarella and other reduced-fat cheeses  · Choose meat and other protein foods that are low in fat  Choose beans or other legumes such as split peas or lentils  Choose fish, skinless poultry (chicken or turkey), or lean cuts of red meat (beef or pork)  Before you cook meat or poultry, cut off any visible fat  · Use less fat and oil  Try baking foods instead of frying them   Add less fat, such as margarine, sour cream, regular salad dressing and mayonnaise to foods  Eat fewer high-fat foods  Some examples of high-fat foods include french fries, doughnuts, ice cream, and cakes  · Eat fewer sweets  Limit foods and drinks that are high in sugar  This includes candy, cookies, regular soda, and sweetened drinks  Exercise:  Exercise at least 30 minutes per day on most days of the week  Some examples of exercise include walking, biking, dancing, and swimming  You can also fit in more physical activity by taking the stairs instead of the elevator or parking farther away from stores  Ask your healthcare provider about the best exercise plan for you  © Copyright [employer] 2018 Information is for End User's use only and may not be sold, redistributed or otherwise used for commercial purposes  All illustrations and images included in CareNotes® are the copyrighted property of [employer]  or [facility] Preventive Visit Patient Instructions  Thank you for completing your Welcome to Medicare Visit or Medicare Annual Wellness Visit today  Your next wellness visit will be due in one year (5/14/2023)  The screening/preventive services that you may require over the next 5-10 years are detailed below  Some tests may not apply to you based off risk factors and/or age  Screening tests ordered at today's visit but not completed yet may show as past due  Also, please note that scanned in results may not display below  Preventive Screenings:  Service Recommendations Previous Testing/Comments   Colorectal Cancer Screening  * Colonoscopy    * Fecal Occult Blood Test (FOBT)/Fecal Immunochemical Test (FIT)  * Fecal DNA/Cologuard Test  * Flexible Sigmoidoscopy Age: 54-65 years old   Colonoscopy: every 10 years (may be performed more frequently if at higher risk)  OR  FOBT/FIT: every 1 year  OR  Cologuard: every 3 years  OR  Sigmoidoscopy: every 5 years  Screening may be recommended earlier than age 48 if at higher risk for colorectal cancer   Also, an individualized decision between you and your healthcare provider will decide whether screening between the ages of 74-80 would be appropriate  Colonoscopy: 07/20/2021  FOBT/FIT: Not on file  Cologuard: Not on file  Sigmoidoscopy: Not on file    Screening Current     Breast Cancer Screening Age: 36 years old  Frequency: every 1-2 years  Not required if history of left and right mastectomy Mammogram: 04/21/2022    Screening Current   Cervical Cancer Screening Between the ages of 21-29, pap smear recommended once every 3 years  Between the ages of 33-67, can perform pap smear with HPV co-testing every 5 years  Recommendations may differ for women with a history of total hysterectomy, cervical cancer, or abnormal pap smears in past  Pap Smear: Not on file    Screening Not Indicated   Hepatitis C Screening Once for adults born between 1945 and 1965  More frequently in patients at high risk for Hepatitis C Hep C Antibody: 04/24/2018    Screening Current   Diabetes Screening 1-2 times per year if you're at risk for diabetes or have pre-diabetes Fasting glucose: No results in last 5 years   A1C: 6 0 % of total Hgb    Screening Not Indicated  History Diabetes   Cholesterol Screening Once every 5 years if you don't have a lipid disorder  May order more often based on risk factors  Lipid panel: 04/22/2022    Screening Current     Other Preventive Screenings Covered by Medicare:  6  Abdominal Aortic Aneurysm (AAA) Screening: covered once if your at risk  You're considered to be at risk if you have a family history of AAA    7  Lung Cancer Screening: covers low dose CT scan once per year if you meet all of the following conditions: (1) Age 50-69; (2) No signs or symptoms of lung cancer; (3) Current smoker or have quit smoking within the last 15 years; (4) You have a tobacco smoking history of at least 30 pack years (packs per day multiplied by number of years you smoked); (5) You get a written order from a healthcare provider  8  Glaucoma Screening: covered annually if you're considered high risk: (1) You have diabetes OR (2) Family history of glaucoma OR (3)  aged 48 and older OR (3)  American aged 72 and older  5  Osteoporosis Screening: covered every 2 years if you meet one of the following conditions: (1) You're estrogen deficient and at risk for osteoporosis based off medical history and other findings; (2) Have a vertebral abnormality; (3) On glucocorticoid therapy for more than 3 months; (4) Have primary hyperparathyroidism; (5) On osteoporosis medications and need to assess response to drug therapy  · Last bone density test (DXA Scan): Not on file  10  HIV Screening: covered annually if you're between the age of 12-76  Also covered annually if you are younger than 13 and older than 72 with risk factors for HIV infection  For pregnant patients, it is covered up to 3 times per pregnancy  Immunizations:  Immunization Recommendations   Influenza Vaccine Annual influenza vaccination during flu season is recommended for all persons aged >= 6 months who do not have contraindications   Pneumococcal Vaccine (Prevnar and Pneumovax)  * Prevnar = PCV13  * Pneumovax = PPSV23   Adults 25-60 years old: 1-3 doses may be recommended based on certain risk factors  Adults 72 years old: Prevnar (PCV13) vaccine recommended followed by Pneumovax (PPSV23) vaccine  If already received PPSV23 since turning 65, then PCV13 recommended at least one year after PPSV23 dose  Hepatitis B Vaccine 3 dose series if at intermediate or high risk (ex: diabetes, end stage renal disease, liver disease)   Tetanus (Td) Vaccine - COST NOT COVERED BY MEDICARE PART B Following completion of primary series, a booster dose should be given every 10 years to maintain immunity against tetanus  Td may also be given as tetanus wound prophylaxis  Tdap Vaccine - COST NOT COVERED BY MEDICARE PART B Recommended at least once for all adults  For pregnant patients, recommended with each pregnancy  Shingles Vaccine (Shingrix) - COST NOT COVERED BY MEDICARE PART B  2 shot series recommended in those aged 48 and above     Health Maintenance Due:      Topic Date Due    Breast Cancer Screening: Mammogram  04/21/2023    Colorectal Cancer Screening  07/19/2024    Hepatitis C Screening  Completed     Immunizations Due:      Topic Date Due    DTaP,Tdap,and Td Vaccines (1 - Tdap) 04/30/2019    COVID-19 Vaccine (3 - Booster for Elois Jump series) 08/09/2021     Advance Directives   What are advance directives? Advance directives are legal documents that state your wishes and plans for medical care  These plans are made ahead of time in case you lose your ability to make decisions for yourself  Advance directives can apply to any medical decision, such as the treatments you want, and if you want to donate organs  What are the types of advance directives? There are many types of advance directives, and each state has rules about how to use them  You may choose a combination of any of the following:  · Living will: This is a written record of the treatment you want  You can also choose which treatments you do not want, which to limit, and which to stop at a certain time  This includes surgery, medicine, IV fluid, and tube feedings  · Durable power of  for healthcare [place] [facility]): This is a written record that states who you want to make healthcare choices for you when you are unable to make them for yourself  This person, called a proxy, is usually a family member or a friend  You may choose more than 1 proxy  · Do not resuscitate (DNR) order:  A DNR order is used in case your heart stops beating or you stop breathing  It is a request not to have certain forms of treatment, such as CPR  A DNR order may be included in other types of advance directives  · Medical directive:   This covers the care that you want if you are in a coma, near death, or unable to make decisions for yourself  You can list the treatments you want for each condition  Treatment may include pain medicine, surgery, blood transfusions, dialysis, IV or tube feedings, and a ventilator (breathing machine)  · Values history: This document has questions about your views, beliefs, and how you feel and think about life  This information can help others choose the care that you would choose  Why are advance directives important? An advance directive helps you control your care  Although spoken wishes may be used, it is better to have your wishes written down  Spoken wishes can be misunderstood, or not followed  Treatments may be given even if you do not want them  An advance directive may make it easier for your family to make difficult choices about your care  Weight Management   Why it is important to manage your weight:  Being overweight increases your risk of health conditions such as heart disease, high blood pressure, type 2 diabetes, and certain types of cancer  It can also increase your risk for osteoarthritis, sleep apnea, and other respiratory problems  Aim for a slow, steady weight loss  Even a small amount of weight loss can lower your risk of health problems  How to lose weight safely:  A safe and healthy way to lose weight is to eat fewer calories and get regular exercise  You can lose up about 1 pound a week by decreasing the number of calories you eat by 500 calories each day  Healthy meal plan for weight management:  A healthy meal plan includes a variety of foods, contains fewer calories, and helps you stay healthy  A healthy meal plan includes the following:  · Eat whole-grain foods more often  A healthy meal plan should contain fiber  Fiber is the part of grains, fruits, and vegetables that is not broken down by your body  Whole-grain foods are healthy and provide extra fiber in your diet   Some examples of whole-grain foods are whole-wheat breads and pastas, oatmeal, Laura Rolling rice, and bulgur  · Eat a variety of vegetables every day  Include dark, leafy greens such as spinach, kale, radha greens, and mustard greens  Eat yellow and orange vegetables such as carrots, sweet potatoes, and winter squash  · Eat a variety of fruits every day  Choose fresh or canned fruit (canned in its own juice or light syrup) instead of juice  Fruit juice has very little or no fiber  · Eat low-fat dairy foods  Drink fat-free (skim) milk or 1% milk  Eat fat-free yogurt and low-fat cottage cheese  Try low-fat cheeses such as mozzarella and other reduced-fat cheeses  · Choose meat and other protein foods that are low in fat  Choose beans or other legumes such as split peas or lentils  Choose fish, skinless poultry (chicken or turkey), or lean cuts of red meat (beef or pork)  Before you cook meat or poultry, cut off any visible fat  · Use less fat and oil  Try baking foods instead of frying them  Add less fat, such as margarine, sour cream, regular salad dressing and mayonnaise to foods  Eat fewer high-fat foods  Some examples of high-fat foods include french fries, doughnuts, ice cream, and cakes  · Eat fewer sweets  Limit foods and drinks that are high in sugar  This includes candy, cookies, regular soda, and sweetened drinks  Exercise:  Exercise at least 30 minutes per day on most days of the week  Some examples of exercise include walking, biking, dancing, and swimming  You can also fit in more physical activity by taking the stairs instead of the elevator or parking farther away from stores  Ask your healthcare provider about the best exercise plan for you  © Copyright [employer] 2018 Information is for End User's use only and may not be sold, redistributed or otherwise used for commercial purposes   All illustrations and images included in CareNotes® are the copyrighted property of [employer]  or [address] [place], [place] [id number]

## 2022-05-15 NOTE — ASSESSMENT & PLAN NOTE
Significantly improved  Patient will continue her improved diet  No need for diabetes medication currently  She is up-to-date on eye and foot exam  Recheck labs prior to next visit in 6 months     Lab Results   Component Value Date    HGBA1C 6 0 (H) 04/22/2022

## 2022-05-15 NOTE — ASSESSMENT & PLAN NOTE
Continue with Dr Verónica Sinha regarding her Prolia injections  She is due to update her DEXA in 10/2022  She will continue with her calcium and vitamin D supplementation as well

## 2022-05-15 NOTE — ASSESSMENT & PLAN NOTE
Improving  Continue improved diet  Will continue to monitor  BMI Counseling: Body mass index is 30 78 kg/m²  The BMI is above normal  Nutrition recommendations include reducing portion sizes  Exercise recommendations include exercising 3-5 times per week

## 2022-05-15 NOTE — ASSESSMENT & PLAN NOTE
Continue KCl 10 mew daily  Recheck with next labs  - Continue NG feeds of elecare 27 shantell/ounce @ 50ml/hour continuos  - Continue to encourage PO intake  - Ranitidine BID

## 2022-05-16 ENCOUNTER — TELEPHONE (OUTPATIENT)
Dept: FAMILY MEDICINE CLINIC | Facility: CLINIC | Age: 77
End: 2022-05-16

## 2022-05-16 NOTE — TELEPHONE ENCOUNTER
Please reach out to patient to see what her concern is  She does see Rheumatology for osteoporosis and is on Prolia for it through them  Perhaps she meant to call them

## 2022-05-18 NOTE — TELEPHONE ENCOUNTER
Patient returned call and asked for number for rheumatologist located in the [address] [place] [address]  Number provided

## 2022-06-20 ENCOUNTER — OFFICE VISIT (OUTPATIENT)
Dept: FAMILY MEDICINE CLINIC | Facility: CLINIC | Age: 77
End: 2022-06-20
Payer: MEDICARE

## 2022-06-20 VITALS
HEIGHT: 60 IN | OXYGEN SATURATION: 97 % | BODY MASS INDEX: 31.73 KG/M2 | HEART RATE: 86 BPM | DIASTOLIC BLOOD PRESSURE: 80 MMHG | SYSTOLIC BLOOD PRESSURE: 130 MMHG | WEIGHT: 161.6 LBS | RESPIRATION RATE: 12 BRPM

## 2022-06-20 DIAGNOSIS — M54.50 ACUTE LEFT-SIDED LOW BACK PAIN WITHOUT SCIATICA: Primary | ICD-10-CM

## 2022-06-20 PROCEDURE — 99214 OFFICE O/P EST MOD 30 MIN: CPT | Performed by: INTERNAL MEDICINE

## 2022-06-20 RX ORDER — CELECOXIB 200 MG/1
200 CAPSULE ORAL 2 TIMES DAILY
Qty: 30 CAPSULE | Refills: 0 | Status: SHIPPED | OUTPATIENT
Start: 2022-06-20

## 2022-06-20 NOTE — PATIENT INSTRUCTIONS
Use Celebrex 1 pill twice a day for 5 days for back pain after that you may use it as needed twice a day but not every day

## 2022-06-20 NOTE — ASSESSMENT & PLAN NOTE
Patient reports lower back pain that started few weeks ago  Denies any problems with urination or defecation  She reports that it goes down to her left lower extremity sometimes  No neurologic deficit  Start Celebrex 200 mg b i d  for 5 days, after that can use as needed  Refer to physical therapy  She will let me know if no improvement

## 2022-06-20 NOTE — PROGRESS NOTES
Assessment/Plan:    Acute left-sided low back pain without sciatica  Patient reports lower back pain that started few weeks ago  Denies any problems with urination or defecation  She reports that it goes down to her left lower extremity sometimes  No neurologic deficit  Start Celebrex 200 mg b i d  for 5 days, after that can use as needed  Refer to physical therapy  She will let me know if no improvement  Diagnoses and all orders for this visit:    Acute left-sided low back pain without sciatica  -     celecoxib (CeleBREX) 200 mg capsule; Take 1 capsule (200 mg total) by mouth 2 (two) times a day  -     Ambulatory Referral to Physical Therapy; Future          Subjective:      Patient ID: Sarai Anaya is a 69 y o  female  Patient came today with the new problem of lower back pain that got worse recently  The following portions of the patient's history were reviewed and updated as appropriate: allergies, current medications, past family history, past medical history, past social history, past surgical history, and problem list     Review of Systems   Constitutional: Negative for chills and fever  Genitourinary: Negative for decreased urine volume, dysuria, flank pain, frequency and urgency  Musculoskeletal: Positive for arthralgias and back pain  Negative for joint swelling  Neurological: Negative for weakness and numbness  Psychiatric/Behavioral: Negative for confusion           Objective:      /80 (BP Location: Left arm, Patient Position: Sitting, Cuff Size: Standard)   Pulse 86   Resp 12   Ht 5' (1 524 m)   Wt 73 3 kg (161 lb 9 6 oz)   SpO2 97%   BMI 31 56 kg/m²     No Known Allergies       Current Outpatient Medications:     Calcium Carbonate-Vitamin D 600-200 MG-UNIT TABS, Take 1 tablet by mouth 2 (two) times a day, Disp: , Rfl:     celecoxib (CeleBREX) 200 mg capsule, Take 1 capsule (200 mg total) by mouth 2 (two) times a day, Disp: 30 capsule, Rfl: 0   Cholecalciferol (VITAMIN D3) 1000 units CAPS, Take by mouth daily  , Disp: , Rfl:     denosumab (PROLIA) 60 mg/mL, Inject under the skin every 6 (six) months  , Disp: , Rfl:     hydrochlorothiazide (HYDRODIURIL) 25 mg tablet, Take 1 tablet (25 mg total) by mouth in the morning , Disp: 30 tablet, Rfl: 5    losartan (COZAAR) 50 mg tablet, Take 1 tablet (50 mg total) by mouth in the morning , Disp: 30 tablet, Rfl: 5    Multiple Vitamins-Minerals (DAILY MULTI PO), Take by mouth daily  , Disp: , Rfl:     potassium chloride (K-DUR,KLOR-CON) 10 mEq tablet, Take 1 tablet (10 mEq total) by mouth in the morning , Disp: 30 tablet, Rfl: 5     Patient Instructions   Use Celebrex 1 pill twice a day for 5 days for back pain after that you may use it as needed twice a day but not every day  Physical Exam  Constitutional:       General: She is not in acute distress  Appearance: She is not toxic-appearing  HENT:      Head: Normocephalic  Cardiovascular:      Rate and Rhythm: Normal rate  Pulses: Normal pulses  Pulmonary:      Effort: Pulmonary effort is normal    Abdominal:      General: Abdomen is flat  Musculoskeletal:         General: Tenderness present  No swelling, deformity or signs of injury  Skin:     General: Skin is warm  Neurological:      Mental Status: She is alert  Cranial Nerves: No cranial nerve deficit  Sensory: No sensory deficit  Motor: No weakness        Gait: Gait normal    Psychiatric:         Mood and Affect: Mood normal

## 2022-06-29 ENCOUNTER — EVALUATION (OUTPATIENT)
Dept: PHYSICAL THERAPY | Facility: MEDICAL CENTER | Age: 77
End: 2022-06-29
Payer: MEDICARE

## 2022-06-29 DIAGNOSIS — M54.42 ACUTE LEFT-SIDED LOW BACK PAIN WITH LEFT-SIDED SCIATICA: Primary | ICD-10-CM

## 2022-06-29 PROCEDURE — 97161 PT EVAL LOW COMPLEX 20 MIN: CPT | Performed by: PHYSICAL THERAPIST

## 2022-06-29 PROCEDURE — 97110 THERAPEUTIC EXERCISES: CPT | Performed by: PHYSICAL THERAPIST

## 2022-06-29 NOTE — PROGRESS NOTES
PT Evaluation     Today's date:  2022  Patient name: Margaret Stanley  : 1945  MRN: [id number]  Referring provider: Jn Mcdermott MD  Dx:   Encounter Diagnosis   Name Primary?  Acute left-sided low back pain with left-sided sciatica                   Assessment  Assessment details: Margaret Stanley is a 70 y/o female who presents with complaints of L LE pain  The patients greatest concern is not being able to move from sit to stand, or walk without LE pain  Primary movement impairment diagnosis of lumbar hypomobility and tissue extensibility deficits, resulting in pathoanatomical symptoms of acute left-sided low back pain with sciatica, which limits her ability to perform functional activities without pain  Pt  will benefit from skilled PT services that includes manual therapy techniques to enhance tissue extensibility, neuromuscular re-education to facilitate motor control, therapeutic exercise to increase functional mobility, and modalities prn to reduce pain and inflammation  Impairments: abnormal gait, abnormal muscle firing, abnormal or restricted ROM, activity intolerance, impaired physical strength, lacks appropriate home exercise program, pain with function and poor posture   Understanding of Dx/Px/POC: good   Prognosis: good    Goals  Short Term Goals: to be achieved by 4 weeks  1) Patient to be independent with basic HEP  2) Decrease pain to 3/10 at its worst     Long Term Goals: to be achieved by discharge  1) FOTO equal to or greater than 57   2) Patient to be independent with comprehensive HEP  3) Patient will be able to perform sit -> stand without pain  4) Patient will be able to walk without pain      Plan  Patient would benefit from: PT eval  Planned modality interventions: thermotherapy: hydrocollator packs  Planned therapy interventions: manual therapy, neuromuscular re-education, patient education, postural training, strengthening, stretching, therapeutic exercise, flexibility, graded exercise, home exercise program, abdominal trunk stabilization and body mechanics training  Frequency: 2x week  Duration in weeks: 6  Treatment plan discussed with: patient      Subjective Evaluation    History of Present Illness  Mechanism of injury: Patient states that after a long car ride to [place] on 2022, she had difficulty getting out of the passenger side of the car  She tried to walk the boardwalk, but she felt terrible pain in her L buttocks and down the leg  It did feel better the more she walked  Currently, she feels pain when moving from a sitting to a standing position  She denies L LE paresthesias and changes in bowel/bladder  She is unable to weed and bend over without experiencing pain  Patient would like to be able to move again without pain  Pain  Current pain ratin  At best pain ratin  Pain scale at highest: 10+  Quality: knife-like      Diagnostic Tests  Abnormal x-ray: 2021:  Multilevel degenerative changes with exaggerated lordosis  Treatments  Previous treatment: physical therapy  Current treatment: physical therapy  Patient Goals  Patient goals for therapy: decreased pain and independence with ADLs/IADLs  Patient goal: Patient would like to be able to be able to walk 1-2 miles a day  Objective     Concurrent Complaints  Negative for night pain, disturbed sleep, bladder dysfunction, bowel dysfunction, saddle (S4) numbness, cardiac problem, kidney problem, gallbladder problem, stomach problem, ulcer, appendix problem, spleen problem, pancreas problem, history of cancer, history of trauma and infection    Static Posture     Lumbar Spine   Lumbar spine (Right): Shifted  Postural Observations  Seated posture: fair        Palpation     Additional Palpation Details  Piriformis tenderness on the left    Tenderness     Lumbar Spine  No tenderness in the spinous process  Left Hip   Tenderness in the PSIS       Neurological Testing Sensation     Lumbar   Left   Intact: light touch    Right   Intact: light touch    Reflexes   Left   Patellar (L4): normal (2+)  Achilles (S1): normal (2+)    Right   Patellar (L4): normal (2+)  Achilles (S1): normal (2+)    Active Range of Motion     Lumbar   Flexion:  Restriction level: moderate  Extension:  with pain Restriction level: moderate  Left lateral flexion:  Restriction level: minimal  Right lateral flexion:  with pain Restriction level: moderate  Left rotation:  Restriction level: minimal  Right rotation:  Restriction level: minimal    Passive Range of Motion     Additional Passive Range of Motion Details  PAROM:  Hypomobility at L4-S1 on the left    Joint Play     Hypomobile: L4, L5 and S1     Strength/Myotome Testing     Left Hip   Planes of Motion   Flexion: 5  Left hip extension strength: NT     Right Hip   Planes of Motion   Flexion: 5  Right hip extension strength: NT     Left Knee   Flexion: 5  Extension: 5    Right Knee   Flexion: 5    Left Ankle/Foot   Dorsiflexion: 5  Plantar flexion: 5  Great toe extension: 5    Right Ankle/Foot   Dorsiflexion: 5  Plantar flexion: 5  Great toe extension: 5    Tests     Lumbar     Left   Positive quadrant and slump test      Left Hip   Positive piriformis  Negative WERNER and FADIR    90/90 SLR: Positive  Functional Assessment      Squat    Unable to perform        General Comments:    Lower quarter screen   Hips: unremarkable  Knees: unremarkable       Precautions:  DM; HTN; osteoporosis    Manuals 6/29            Piriformis release             STM/MFR                                       Neuro Re-Ed                                                    Ther Ex             Bike             SKTC 10x 10s            Sciatic n glide 10x            LTR 20x5s            Sup piriformis str 3x30s                                      Ther Activity                                       Gait Training                                       Modalities [address] 10'                                Shyanne Marenisco, PT  6/30/2022,8:50 AM

## 2022-07-08 ENCOUNTER — OFFICE VISIT (OUTPATIENT)
Dept: PHYSICAL THERAPY | Facility: MEDICAL CENTER | Age: 77
End: 2022-07-08
Payer: MEDICARE

## 2022-07-08 DIAGNOSIS — M54.42 ACUTE LEFT-SIDED LOW BACK PAIN WITH LEFT-SIDED SCIATICA: Primary | ICD-10-CM

## 2022-07-08 PROCEDURE — 97110 THERAPEUTIC EXERCISES: CPT | Performed by: PHYSICAL THERAPIST

## 2022-07-08 PROCEDURE — 97140 MANUAL THERAPY 1/> REGIONS: CPT | Performed by: PHYSICAL THERAPIST

## 2022-07-08 NOTE — PROGRESS NOTES
Daily Note     Today's date: 2022  Patient name: James Zepeda  : 1945  MRN: [id number]  Referring provider: Keena Mark MD  Dx:   Encounter Diagnosis     ICD-10-CM    1  Acute left-sided low back pain with left-sided sciatica  M54 42                   Subjective:  Patient states that she is still having trouble getting in and out of the car, but she is not having radiating pain past the left buttock region  Objective: See treatment diary below  Assessment:  Patient presents c/ left sided piriformis tightness and TTP  She has difficulty c/ transfers from supine <-> sit  Patient tolerated exercises well  Tolerated treatment well  Patient would benefit from continued PT  Plan: Continue per plan of care        Precautions:  DM; HTN; osteoporosis    Manuals            Piriformis release  AZ           STM/MFR  AZ                                     Neuro Re-Ed                                                    Ther Ex             Bike             SKTC 10x 10s 10x 10s           Sciatic n glide 10x 10x           LTR 20x5s 20x5s           Sup piriformis str 3x30s 3x30s                                     Ther Activity                                       Gait Training                                       Modalities              10' 10

## 2022-07-11 ENCOUNTER — OFFICE VISIT (OUTPATIENT)
Dept: PHYSICAL THERAPY | Facility: MEDICAL CENTER | Age: 77
End: 2022-07-11
Payer: MEDICARE

## 2022-07-11 DIAGNOSIS — M54.42 ACUTE LEFT-SIDED LOW BACK PAIN WITH LEFT-SIDED SCIATICA: Primary | ICD-10-CM

## 2022-07-11 PROCEDURE — 97140 MANUAL THERAPY 1/> REGIONS: CPT | Performed by: PHYSICAL THERAPIST

## 2022-07-11 PROCEDURE — 97110 THERAPEUTIC EXERCISES: CPT | Performed by: PHYSICAL THERAPIST

## 2022-07-11 NOTE — PROGRESS NOTES
Daily Note     Today's date: 2022  Patient name: Sariah Lopes  : 1945  MRN: [id number]  Referring provider: Jermaine Edwards MD  Dx:   Encounter Diagnosis     ICD-10-CM    1  Acute left-sided low back pain with left-sided sciatica  M54 42                   Subjective:  Patient states that she is doing well  Objective: See treatment diary below  Assessment:  Patient presents without radiating pain past the left buttock region  She did well after lv and walked more yesterday  She was sore after walking last night  Tolerated treatment well  Patient would benefit from continued PT    Plan: Continue per plan of care        Precautions:  DM; HTN; osteoporosis    Manuals           Piriformis release  AZ AZ          STM/MFR  AZ AZ                                    Neuro Re-Ed                                                    Ther Ex             Bike   5'          SKTC 10x 10s 10x 10s 10x 10s          Sciatic n glide 10x 10x 10x          LTR 20x5s 20x5s 20x5s          Sup piriformis str 3x30s 3x30s 3x30s                                    Ther Activity                                       Gait Training                                       Modalities              10' 10' 10'

## 2022-07-14 ENCOUNTER — OFFICE VISIT (OUTPATIENT)
Dept: PHYSICAL THERAPY | Facility: MEDICAL CENTER | Age: 77
End: 2022-07-14
Payer: MEDICARE

## 2022-07-14 DIAGNOSIS — M54.42 ACUTE LEFT-SIDED LOW BACK PAIN WITH LEFT-SIDED SCIATICA: Primary | ICD-10-CM

## 2022-07-14 PROCEDURE — 97140 MANUAL THERAPY 1/> REGIONS: CPT | Performed by: PHYSICAL THERAPIST

## 2022-07-14 PROCEDURE — 97110 THERAPEUTIC EXERCISES: CPT | Performed by: PHYSICAL THERAPIST

## 2022-07-14 NOTE — PROGRESS NOTES
Daily Note     Today's date: 2022  Patient name: Cheri Nielsen  : 1945  MRN: [id number]  Referring provider: Hermelinda Billingsley  Dx:   Encounter Diagnosis     ICD-10-CM    1  Acute left-sided low back pain with left-sided sciatica  M54 42                   Subjective:  Patient states that she is doing okay  Objective: See treatment diary below  Assessment:  Patient demonstrates good tolerance to tx plan  She demonstrates increased mobility c/ transferring from supine to sit  Tolerated treatment well  Patient would benefit from continued PT  Plan: Continue per plan of care        Precautions:  DM; HTN; osteoporosis    Manuals          Piriformis release  AZ AZ AZ         STM/MFR  AZ AZ AZ                                   Neuro Re-Ed                                                    Ther Ex             Bike   ' 10'         SKTC 10x 10s 10x 10s 10x 10s 10x 10s         Sciatic n glide 10x 10x 10x 10x         LTR 20x5s 20x5s 20x5s 20x5s         Sup piriformis str 3x30s 3x30s 3x30s 3x30s                                   Ther Activity                                       Gait Training                                       Modalities              10' 10' 10' 10'

## 2022-07-18 ENCOUNTER — OFFICE VISIT (OUTPATIENT)
Dept: PHYSICAL THERAPY | Facility: MEDICAL CENTER | Age: 77
End: 2022-07-18
Payer: MEDICARE

## 2022-07-18 DIAGNOSIS — M54.42 ACUTE LEFT-SIDED LOW BACK PAIN WITH LEFT-SIDED SCIATICA: Primary | ICD-10-CM

## 2022-07-18 PROCEDURE — 97140 MANUAL THERAPY 1/> REGIONS: CPT | Performed by: PHYSICAL THERAPIST

## 2022-07-18 PROCEDURE — 97110 THERAPEUTIC EXERCISES: CPT | Performed by: PHYSICAL THERAPIST

## 2022-07-18 NOTE — PROGRESS NOTES
Daily Note     Today's date: 2022  Patient name: Himanshu Chaves  : 1945  MRN: [id number]  Referring provider: Parvin Bhakta  Dx:   Encounter Diagnosis     ICD-10-CM    1  Acute left-sided low back pain with left-sided sciatica  M54 42                   Subjective:  Patient states that she feels good  Objective: See treatment diary below  Assessment:  Patient demonstrates increased ability to ambulate and perform all transfers without pain  Tolerated treatment well  Patient would benefit from continued PT  Plan: Continue per plan of care        Precautions:  DM; HTN; osteoporosis    Manuals         Piriformis release  AZ AZ AZ AZ        STM/MFR  AZ AZ AZ AZ                                  Neuro Re-Ed                                                    Ther Ex             Bike   5' 10' 10'        SKTC 10x 10s 10x 10s 10x 10s 10x 10s 10x 10s        Sciatic n glide 10x 10x 10x 10x 10x        LTR 20x5s 20x5s 20x5s 20x5s 20x5s        Sup piriformis str 3x30s 3x30s 3x30s 3x30s 3x30s                                  Ther Activity                                       Gait Training                                       Modalities              10' 10' 10' 10' 10'

## 2022-07-21 ENCOUNTER — OFFICE VISIT (OUTPATIENT)
Dept: PHYSICAL THERAPY | Facility: MEDICAL CENTER | Age: 77
End: 2022-07-21
Payer: MEDICARE

## 2022-07-21 DIAGNOSIS — M54.42 ACUTE LEFT-SIDED LOW BACK PAIN WITH LEFT-SIDED SCIATICA: Primary | ICD-10-CM

## 2022-07-21 PROCEDURE — 97110 THERAPEUTIC EXERCISES: CPT | Performed by: PHYSICAL THERAPIST

## 2022-07-21 PROCEDURE — 97140 MANUAL THERAPY 1/> REGIONS: CPT | Performed by: PHYSICAL THERAPIST

## 2022-07-25 ENCOUNTER — OFFICE VISIT (OUTPATIENT)
Dept: PHYSICAL THERAPY | Facility: MEDICAL CENTER | Age: 77
End: 2022-07-25
Payer: MEDICARE

## 2022-07-25 DIAGNOSIS — M54.42 ACUTE LEFT-SIDED LOW BACK PAIN WITH LEFT-SIDED SCIATICA: Primary | ICD-10-CM

## 2022-07-25 PROCEDURE — 97140 MANUAL THERAPY 1/> REGIONS: CPT | Performed by: PHYSICAL THERAPIST

## 2022-07-25 PROCEDURE — 97110 THERAPEUTIC EXERCISES: CPT | Performed by: PHYSICAL THERAPIST

## 2022-07-25 NOTE — PROGRESS NOTES
Daily Note     Today's date: 2022  Patient name: James Zepeda  : 1945  MRN: [id number]  Referring provider: Serina Bonilla  Dx:   Encounter Diagnosis     ICD-10-CM    1  Acute left-sided low back pain with left-sided sciatica  M54 42                   Subjective:  Patient states that she is doing well  Objective: See treatment diary below  Assessment:  Patient demonstrates ability to ambulate and transfer c/ significantly decreased pain  Tolerated treatment well  Patient would benefit from continued PT  Plan: Continue per plan of care        Precautions:  DM; HTN; osteoporosis    Manuals       Piriformis release  AZ AZ AZ AZ AZ AZ      STM/MFR  AZ AZ AZ AZ AZ AZ                                Neuro Re-Ed                                                    Ther Ex             Bike   5' 10' 10' 10' 10'      SKTC 10x 10s 10x 10s 10x 10s 10x 10s 10x 10s 10x 10s 10x 10s      Sciatic n glide 10x 10x 10x 10x 10x 10x 10x      LTR 20x5s 20x5s 20x5s 20x5s 20x5s 20x5s 20x5s      Sup piriformis str 3x30s 3x30s 3x30s 3x30s 3x30s 3x30s 3x30s      Seated piriformis str      3x30s 3x30s                   Ther Activity                                       Gait Training                                       Modalities              10' 10' 10' 10' 10'  10'

## 2022-07-28 ENCOUNTER — OFFICE VISIT (OUTPATIENT)
Dept: PHYSICAL THERAPY | Facility: MEDICAL CENTER | Age: 77
End: 2022-07-28
Payer: MEDICARE

## 2022-07-28 DIAGNOSIS — M54.42 ACUTE LEFT-SIDED LOW BACK PAIN WITH LEFT-SIDED SCIATICA: Primary | ICD-10-CM

## 2022-07-28 PROCEDURE — 97110 THERAPEUTIC EXERCISES: CPT | Performed by: PHYSICAL THERAPIST

## 2022-07-28 PROCEDURE — 97140 MANUAL THERAPY 1/> REGIONS: CPT | Performed by: PHYSICAL THERAPIST

## 2022-07-28 NOTE — PROGRESS NOTES
Daily Note     Today's date: 2022  Patient name: Nasim Davis  : 1945  MRN: [id number]  Referring provider: Brianne Gonzales  Dx:   Encounter Diagnosis     ICD-10-CM    1  Acute left-sided low back pain with left-sided sciatica  M54 42                   Subjective:  Patient states that she is sore today  Objective: See treatment diary below  Assessment:  Patient presents c/ soreness while walking into PT today, but this subsided post tx  She is doing well c/ all exercise progressions  Tolerated treatment well  Patient would benefit from continued PT  Plan: Continue per plan of care        Precautions:  DM; HTN; osteoporosis    Manuals      Piriformis release  AZ AZ AZ AZ AZ AZ AZ     STM/MFR  AZ AZ AZ AZ AZ AZ AZ                               Neuro Re-Ed                                                    Ther Ex             Bike   5' 10' 10' 10' 10' 10'     SKTC 10x 10s 10x 10s 10x 10s 10x 10s 10x 10s 10x 10s 10x 10s 10x 10s     Sciatic n glide 10x 10x 10x 10x 10x 10x 10x 10x     LTR 20x5s 20x5s 20x5s 20x5s 20x5s 20x5s 20x5s 20x5s     Sup piriformis str 3x30s 3x30s 3x30s 3x30s 3x30s 3x30s 3x30s 3x30s     Seated piriformis str      3x30s 3x30s 3x30s                  Ther Activity                                       Gait Training                                       Modalities              10' 10' 10' 10' 10'  10' 10'
- - -

## 2022-08-01 ENCOUNTER — OFFICE VISIT (OUTPATIENT)
Dept: PHYSICAL THERAPY | Facility: MEDICAL CENTER | Age: 77
End: 2022-08-01
Payer: MEDICARE

## 2022-08-01 DIAGNOSIS — M54.42 ACUTE LEFT-SIDED LOW BACK PAIN WITH LEFT-SIDED SCIATICA: Primary | ICD-10-CM

## 2022-08-01 PROCEDURE — 97140 MANUAL THERAPY 1/> REGIONS: CPT | Performed by: PHYSICAL THERAPIST

## 2022-08-01 PROCEDURE — 97110 THERAPEUTIC EXERCISES: CPT | Performed by: PHYSICAL THERAPIST

## 2022-08-01 NOTE — PROGRESS NOTES
Daily Note     Today's date: 2022  Patient name: David Espinoza  : 1945  MRN: [id number]  Referring provider: Abran Anaya  Dx:   Encounter Diagnosis     ICD-10-CM    1  Acute left-sided low back pain with left-sided sciatica  M54 42                   Subjective:  Patient states that she is doing well  Objective: See treatment diary below  Assessment:  Patient presents c/ increased ability to ambulate without pain  She is doing well c/ the exercises  Tolerated treatment well  Patient would benefit from continued PT  Plan: Continue per plan of care        Precautions:  DM; HTN; osteoporosis    Manuals     Piriformis release  AZ AZ AZ AZ AZ AZ AZ AZ    STM/MFR  AZ AZ AZ AZ AZ AZ AZ AZ                              Neuro Re-Ed                                                    Ther Ex             Bike   5' 10' 10' 10' 10' 10' 10'    SKTC 10x 10s 10x 10s 10x 10s 10x 10s 10x 10s 10x 10s 10x 10s 10x 10s 10x 10s    Sciatic n glide 10x 10x 10x 10x 10x 10x 10x 10x 10x    LTR 20x5s 20x5s 20x5s 20x5s 20x5s 20x5s 20x5s 20x5s 20x5s    Sup piriformis str 3x30s 3x30s 3x30s 3x30s 3x30s 3x30s 3x30s 3x30s 3x30s    Seated piriformis str      3x30s 3x30s 3x30s 3x30s                 Ther Activity                                       Gait Training                                       Modalities              10' 10' 10' 10' 10'  10' 10' 10'

## 2022-08-10 ENCOUNTER — OFFICE VISIT (OUTPATIENT)
Dept: PHYSICAL THERAPY | Facility: MEDICAL CENTER | Age: 77
End: 2022-08-10
Payer: MEDICARE

## 2022-08-10 DIAGNOSIS — M54.42 ACUTE LEFT-SIDED LOW BACK PAIN WITH LEFT-SIDED SCIATICA: Primary | ICD-10-CM

## 2022-08-10 PROCEDURE — 97140 MANUAL THERAPY 1/> REGIONS: CPT | Performed by: PHYSICAL THERAPIST

## 2022-08-10 PROCEDURE — 97110 THERAPEUTIC EXERCISES: CPT | Performed by: PHYSICAL THERAPIST

## 2022-08-10 NOTE — PROGRESS NOTES
Daily Note     Today's date: 8/10/2022  Patient name: Amelie Daley  : 1945  MRN: [id number]  Referring provider: Toñito Bustamante  Dx:   Encounter Diagnosis     ICD-10-CM    1  Acute left-sided low back pain with left-sided sciatica  M54 42                   Subjective:  Patient states that she is able to walk and do all of the exercises without pain, but she continues c/ some difficulty getting in and out of a chair, or getting in and out of a care  Objective: See treatment diary below  Assessment:  Patient demonstrates stiffness today and difficulty getting out of the car  She was able to perform all exercises without pain and demonstrates the ability to transfer in and out of a chair without pain  Patient instructed to continue c/ HEP  Tolerated treatment well  Patient would benefit from continued PT  Plan: Continue per plan of care        Precautions:  DM; HTN; osteoporosis    Manuals 6/29 7/8 7/11 7/14 7/18 7/21 7/25 7/28 8/1 8/10   Piriformis release  AZ AZ AZ AZ AZ AZ AZ AZ AZ   STM/MFR  AZ AZ AZ AZ AZ AZ AZ AZ AZ                             Neuro Re-Ed                                                    Ther Ex             Bike   ' 10' 10' 10' 10' 10' 10' 10'   SKTC 10x 10s 10x 10s 10x 10s 10x 10s 10x 10s 10x 10s 10x 10s 10x 10s 10x 10s 10x 10s   Sciatic n glide 10x 10x 10x 10x 10x 10x 10x 10x 10x 10x   LTR 20x5s 20x5s 20x5s 20x5s 20x5s 20x5s 20x5s 20x5s 20x5s 20x5s   Sup piriformis str 3x30s 3x30s 3x30s 3x30s 3x30s 3x30s 3x30s 3x30s 3x30s 3x30s   Seated piriformis str      3x30s 3x30s 3x30s 3x30s 3x30s   Seated ham str          3x30s   Sit <-> stand          5x   Standing hip abd          3x10   Ther Activity                                       Gait Training                                       Modalities              10' 10' 10' 10' 10'  10' 10' 10' 10'

## 2022-10-14 ENCOUNTER — HOSPITAL ENCOUNTER (OUTPATIENT)
Dept: BONE DENSITY | Facility: MEDICAL CENTER | Age: 77
Discharge: HOME | End: 2022-10-14
Payer: MEDICARE

## 2022-10-14 DIAGNOSIS — M81.0 AGE-RELATED OSTEOPOROSIS WITHOUT CURRENT PATHOLOGICAL FRACTURE: ICD-10-CM

## 2022-10-14 PROCEDURE — 77080 DXA BONE DENSITY AXIAL: CPT

## 2022-10-26 LAB
ALBUMIN SERPL-MCNC: 3.3 G/DL (ref 3.6–5.1)
ALBUMIN/GLOB SERPL: 1.1 (CALC) (ref 1–2.5)
ALP SERPL-CCNC: 81 U/L (ref 37–153)
ALT SERPL-CCNC: 22 U/L (ref 6–29)
AST SERPL-CCNC: 40 U/L (ref 10–35)
BASOPHILS # BLD AUTO: 32 CELLS/UL (ref 0–200)
BASOPHILS NFR BLD AUTO: 0.8 %
BILIRUB SERPL-MCNC: 1.9 MG/DL (ref 0.2–1.2)
BUN SERPL-MCNC: 13 MG/DL (ref 7–25)
BUN/CREAT SERPL: ABNORMAL (CALC) (ref 6–22)
CALCIUM SERPL-MCNC: 10.1 MG/DL (ref 8.6–10.4)
CHLORIDE SERPL-SCNC: 103 MMOL/L (ref 98–110)
CO2 SERPL-SCNC: 26 MMOL/L (ref 20–32)
CREAT SERPL-MCNC: 0.79 MG/DL (ref 0.6–1)
EOSINOPHIL # BLD AUTO: 152 CELLS/UL (ref 15–500)
EOSINOPHIL NFR BLD AUTO: 3.8 %
ERYTHROCYTE [DISTWIDTH] IN BLOOD BY AUTOMATED COUNT: 12.4 % (ref 11–15)
GFR/BSA.PRED SERPLBLD CYS-BASED-ARV: 77 ML/MIN/1.73M2
GLOBULIN SER CALC-MCNC: 3.1 G/DL (CALC) (ref 1.9–3.7)
GLUCOSE SERPL-MCNC: 120 MG/DL (ref 65–99)
HBA1C MFR BLD: 6.5 % OF TOTAL HGB
HCT VFR BLD AUTO: 33.6 % (ref 35–45)
HGB BLD-MCNC: 12.1 G/DL (ref 11.7–15.5)
LYMPHOCYTES # BLD AUTO: 1308 CELLS/UL (ref 850–3900)
LYMPHOCYTES NFR BLD AUTO: 32.7 %
MCH RBC QN AUTO: 36.4 PG (ref 27–33)
MCHC RBC AUTO-ENTMCNC: 36 G/DL (ref 32–36)
MCV RBC AUTO: 101.2 FL (ref 80–100)
MONOCYTES # BLD AUTO: 284 CELLS/UL (ref 200–950)
MONOCYTES NFR BLD AUTO: 7.1 %
NEUTROPHILS # BLD AUTO: 2224 CELLS/UL (ref 1500–7800)
NEUTROPHILS NFR BLD AUTO: 55.6 %
PLATELET # BLD AUTO: 114 THOUSAND/UL (ref 140–400)
PMV BLD REES-ECKER: 10.7 FL (ref 7.5–12.5)
POTASSIUM SERPL-SCNC: 3.7 MMOL/L (ref 3.5–5.3)
PROT SERPL-MCNC: 6.4 G/DL (ref 6.1–8.1)
RBC # BLD AUTO: 3.32 MILLION/UL (ref 3.8–5.1)
SODIUM SERPL-SCNC: 136 MMOL/L (ref 135–146)
WBC # BLD AUTO: 4 THOUSAND/UL (ref 3.8–10.8)

## 2022-11-07 ENCOUNTER — RA CDI HCC (OUTPATIENT)
Dept: OTHER | Facility: HOSPITAL | Age: 77
End: 2022-11-07

## 2022-11-13 NOTE — PROGRESS NOTES
FAMILY PRACTICE OFFICE VISIT  [facility] - [facility]       NAME: Daniel Salguero  AGE: 69 y o  SEX: female       : 1945        MRN: [id number]    DATE: 11/15/2022  TIME: 10:11 AM    Assessment and Plan     Problem List Items Addressed This Visit        Endocrine    Type 2 diabetes mellitus without complication, without long-term current use of insulin ([address] ) - Primary     Well controlled  She will continue to the remain off medication and use diet for her blood sugar control  We did review that there was an increase in her A1c from 6 0% in April  She was encouraged to be mindful of her carbohydrate intake to avoid continued increases in her A1c  Her she is up-to-date on her foot exam and eye exam   Will continue to monitor  Lab Results   Component Value Date    HGBA1C 6 5 (H) 10/25/2022            Relevant Orders    CBC and differential    Comprehensive metabolic panel    Hemoglobin A1C    Lipid Panel with Direct LDL reflex    TSH, 3rd generation with Free T4 reflex    Microalbumin, Random Urine (W/Creatinine)       Cardiovascular and Mediastinum    Essential hypertension     Well controlled  Continue losartan 50 mg daily and hydrochlorothiazide 25 mg daily  Will continue to monitor  Relevant Medications    hydrochlorothiazide (HYDRODIURIL) 25 mg tablet    losartan (COZAAR) 50 mg tablet    potassium chloride (K-DUR,KLOR-CON) 10 mEq tablet    Other Relevant Orders    CBC and differential    Comprehensive metabolic panel    Hemoglobin A1C    Lipid Panel with Direct LDL reflex    TSH, 3rd generation with Free T4 reflex    Microalbumin, Random Urine (W/Creatinine)       Musculoskeletal and Integument    Osteoporosis     Currently on Prolia through rheumatology  She will be likely transitioning rheumatologist due to proximity to her home  Continue calcium and vitamin-D supplementation  She is up-to-date on DEXA scans    Her last DEXA was done 1 month ago  Hematopoietic and Hemostatic    Thrombocytopenia (HCC)     Stable  Will continue to monitor on labs  Relevant Orders    CBC and differential       Other    Obesity (BMI 30 0-34  9)     BMI Counseling: Body mass index is 31 26 kg/m²  The BMI is above normal  Nutrition recommendations include moderation in carbohydrate intake  Exercise recommendations include exercising 3-5 times per week  Relevant Orders    CBC and differential    Comprehensive metabolic panel    Hemoglobin A1C    Lipid Panel with Direct LDL reflex    TSH, 3rd generation with Free T4 reflex    Microalbumin, Random Urine (W/Creatinine)    Hypokalemia     Well controlled on last labs she with a level of 3 7  Continue potassium chloride 10 mil equivalents daily  Will continue to monitor  Relevant Orders    Comprehensive metabolic panel    Low hematocrit     Stable on recent labs  Will continue to monitor  Other Visit Diagnoses     Need for influenza vaccination        Relevant Orders    influenza vaccine, high-dose, PF 0 7 mL (FLUZONE HIGH-DOSE) (Completed)          Type 2 diabetes mellitus without complication, without long-term current use of insulin ([facility] [address] )  Well controlled  She will continue to the remain off medication and use diet for her blood sugar control  We did review that there was an increase in her A1c from 6 0% in April  She was encouraged to be mindful of her carbohydrate intake to avoid continued increases in her A1c  Her she is up-to-date on her foot exam and eye exam   Will continue to monitor  Lab Results   Component Value Date    HGBA1C 6 5 (H) 10/25/2022       Essential hypertension  Well controlled  Continue losartan 50 mg daily and hydrochlorothiazide 25 mg daily  Will continue to monitor  Osteoporosis  Currently on Prolia through rheumatology  She will be likely transitioning rheumatologist due to proximity to her home    Continue calcium and vitamin-D supplementation  She is up-to-date on DEXA scans  Her last DEXA was done 1 month ago  Thrombocytopenia (HCC)  Stable  Will continue to monitor on labs  Obesity (BMI 30 0-34  9)  BMI Counseling: Body mass index is 31 26 kg/m²  The BMI is above normal  Nutrition recommendations include moderation in carbohydrate intake  Exercise recommendations include exercising 3-5 times per week  Hypokalemia  Well controlled on last labs she with a level of 3 7  Continue potassium chloride 10 mil equivalents daily  Will continue to monitor  Low hematocrit  Stable on recent labs  Will continue to monitor  Chief Complaint     Chief Complaint   Patient presents with   • Follow-up       History of Present Illness   William Prince is a 69y o -year-old female who presents for follow-up on chronic conditions  The patient presents today for follow-up on diabetes  At the last visit, A1c was 6 0% in April 2022  The patient reports that current diabetic medications include none - currently using diet  She does not check her glucose at home  The patient denies hypoglycemic episodes  Last eye exam was January 2022  Last foot exam was February 2022  Her A1c last month was 6 5%  The patient reports that current blood pressure medications include losartan 50 mg daily and hydrochlorothiazide 25 mg daily  Blood pressure readings at home are approximately 120/80  The patient denies symptoms of poor control such as chest pain, shortness of breath, leg swelling, vision changes, headaches, or dizziness  The patient reports 1 unsweetened iced tea daily for caffeine intake and unlimited salt intake  The patient has osteoporosis, for which she is treating with Prolia injections through Dr Lamberto Raymundo (will be transferring to Dr Bárbara Diaz due to proximity to her home)  Her last DEXA scan was 10/14/22  She does continue also with calcium and vitamin-D supplementation      Since the patent's last visit, weight has decreased 4 pounds since June  Diet is reported to be a little higher in carbs unusual  Exercise occurs with walks about 1 5-2 miles a day almost daily  Patient has a history of hypokalemia for which she takes potassium chloride 10 meq daily  Her last level was 3 7 in October 2022  She does shortly has a history of low platelet count and low hematocrit  These were stable on her labs in October at 114 and 33 6, respectively  Patient notes that she was seen by Dr Ermelinda Urrutia for back pain  She notes that she did physical therapy for 2 and half months and is now doing home exercises  She states that it is improving  Review of Systems   Review of Systems   Constitutional: Negative for chills and fever  Respiratory: Negative for shortness of breath  Cardiovascular: Negative for chest pain, palpitations and leg swelling  Musculoskeletal: Positive for back pain  Neurological: Negative for dizziness and headaches  Active Problem List     Patient Active Problem List   Diagnosis   • Dietary calcium deficiency   • Obesity (BMI 30 0-34  9)   • Osteoporosis   • Hx of colonic polyps   • Heart murmur   • Type 2 diabetes mellitus without complication, without long-term current use of insulin (HCC)   • Thrombocytopenia (HCC)   • Essential hypertension   • Hypokalemia   • Low hematocrit   • Leg swelling   • Primary generalized (osteo)arthritis   • Lumbar spondylosis   • Acute left-sided low back pain without sciatica         Past Medical History:  Past Medical History:   Diagnosis Date   • Chicken pox    • Diabetes mellitus ([facility] [address] )    • Fall 05/01/2019   • Hypertension    • Kidney stones    • Malignant neoplasm of skin     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   • Osteoarthritis    • Osteoporosis        Past Surgical History:  Past Surgical History:   Procedure Laterality Date   • COLONOSCOPY  2018   • COLONOSCOPY  2015    COMPLETE; DUE 3 YRS   • COLONOSCOPY  06/20/2018    polyp x1   • DILATION AND CURETTAGE OF UTERUS  2013    THICKENED ENDO ON USG, CVX STENOSIS, PATH: BENIGN POLYP FRAGMENTS AND INACTIVE ENDOMETRIUM  NO NEOPLASIA ; IMPRESSION: 10/27/14; Leslie Reina   • HYSTEROSCOPY     • MOHS SURGERY  2020    BCC on face   • TONSILLECTOMY         Family History:  Family History   Problem Relation Age of Onset   • Kidney cancer Mother 79        RENAL CANCER   • Hypertension Mother    • Skin cancer Mother    • Osteoporosis Mother    • Osteoporotic fracture Mother         hip - age 80    • Osteoporotic fracture Paternal Grandmother        Social History:  Social History     Socioeconomic History   • Marital status: Single     Spouse name: Not on file   • Number of children: Not on file   • Years of education: Not on file   • Highest education level: Not on file   Occupational History   • Occupation: [employer]   Tobacco Use   • Smoking status: Former Smoker     Packs/day: 0 50     Years: 5 00     Pack years: 2 50     Quit date: 1970     Years since quittin 9   • Smokeless tobacco: Never Used   • Tobacco comment: STARTED AT AGE 17   STOPPED AT AGE 26     Vaping Use   • Vaping Use: Never used   Substance and Sexual Activity   • Alcohol use: Yes     Comment: Wine for Special Occassion   • Drug use: Never   • Sexual activity: Not on file   Other Topics Concern   • Not on file   Social History Narrative    EXERCISES MODERATELY LESS THAN 3 TIMES WEEK     Social Determinants of Health     Financial Resource Strain: Not on file   Food Insecurity: Not on file   Transportation Needs: Not on file   Physical Activity: Not on file   Stress: Not on file   Social Connections: Not on file   Intimate Partner Violence: Not on file   Housing Stability: Not on file       Objective     Vitals:    22 1500 22 1549   BP: 140/80 126/70   BP Location: Left arm    Cuff Size: Standard    Pulse: 79    Resp: 14    Temp: (!) 97 °F (36 1 °C)    TempSrc: Tympanic    SpO2: 98%    Weight: 71 4 kg (157 lb 6 4 oz)    Height: 4' 11 5" (1 511 m)      Wt Readings from Last 3 Encounters:   11/14/22 71 4 kg (157 lb 6 4 oz)   11/10/22 71 2 kg (157 lb)   06/20/22 73 3 kg (161 lb 9 6 oz)       Physical Exam  Vitals reviewed  Constitutional:       General: She is not in acute distress  Appearance: Normal appearance  She is well-developed  She is obese  She is not ill-appearing  HENT:      Head: Normocephalic and atraumatic  Neck:      Thyroid: No thyromegaly  Cardiovascular:      Rate and Rhythm: Normal rate and regular rhythm  Pulses: Normal pulses  Heart sounds: Normal heart sounds  No murmur heard  Comments: No carotid bruits noted  Pulmonary:      Effort: Pulmonary effort is normal       Breath sounds: Normal breath sounds  No wheezing, rhonchi or rales  Musculoskeletal:      Cervical back: Neck supple  Right lower leg: Edema (1+ above tight socks) present  Left lower leg: Edema (1+ above tight socks) present  Lymphadenopathy:      Cervical: No cervical adenopathy  Skin:     General: Skin is warm and dry  Findings: No rash  Neurological:      Mental Status: She is alert  Psychiatric:         Mood and Affect: Mood normal          Behavior: Behavior normal          Thought Content:  Thought content normal          Judgment: Judgment normal          Pertinent Laboratory/Diagnostic Studies:  Lab Results   Component Value Date    BUN 13 10/25/2022    CREATININE 0 79 10/25/2022    CALCIUM 10 1 10/25/2022    K 3 7 10/25/2022    CO2 26 10/25/2022     10/25/2022     Lab Results   Component Value Date    ALT 22 10/25/2022    AST 40 (H) 10/25/2022    ALKPHOS 81 10/25/2022       Lab Results   Component Value Date    WBC 4 0 10/25/2022    HGB 12 1 10/25/2022    HCT 33 6 (L) 10/25/2022     2 (H) 10/25/2022     (L) 10/25/2022     Lab Results   Component Value Date    TRIG 69 04/22/2022     Lab Results   Component Value Date    HDL 60 04/22/2022     Lab Results   Component Value Date    LDLCALC 82 04/22/2022     Lab Results   Component Value Date    HGBA1C 6 5 (H) 10/25/2022       Results for orders placed or performed in visit on 10/25/22   Comprehensive metabolic panel   Result Value Ref Range    Glucose, Random 120 (H) 65 - 99 mg/dL    BUN 13 7 - 25 mg/dL    Creatinine 0 79 0 60 - 1 00 mg/dL    eGFR 77 > OR = 60 mL/min/1 73m2    SL AMB BUN/CREATININE RATIO NOT APPLICABLE 6 - 22 (calc)    Sodium 136 135 - 146 mmol/L    Potassium 3 7 3 5 - 5 3 mmol/L    Chloride 103 98 - 110 mmol/L    CO2 26 20 - 32 mmol/L    Calcium 10 1 8 6 - 10 4 mg/dL    Protein, Total 6 4 6 1 - 8 1 g/dL    Albumin 3 3 (L) 3 6 - 5 1 g/dL    Globulin 3 1 1 9 - 3 7 g/dL (calc)    Albumin/Globulin Ratio 1 1 1 0 - 2 5 (calc)    TOTAL BILIRUBIN 1 9 (H) 0 2 - 1 2 mg/dL    Alkaline Phosphatase 81 37 - 153 U/L    AST 40 (H) 10 - 35 U/L    ALT 22 6 - 29 U/L   CBC and differential   Result Value Ref Range    White Blood Cell Count 4 0 3 8 - 10 8 Thousand/uL    Red Blood Cell Count 3 32 (L) 3 80 - 5 10 Million/uL    Hemoglobin 12 1 11 7 - 15 5 g/dL    HCT 33 6 (L) 35 0 - 45 0 %     2 (H) 80 0 - 100 0 fL    MCH 36 4 (H) 27 0 - 33 0 pg    MCHC 36 0 32 0 - 36 0 g/dL    RDW 12 4 11 0 - 15 0 %    Platelet Count 295 (L) 140 - 400 Thousand/uL    SL AMB MPV 10 7 7 5 - 12 5 fL    Neutrophils (Absolute) 2,224 1,500 - 7,800 cells/uL    Lymphocytes (Absolute) 1,308 850 - 3,900 cells/uL    Monocytes (Absolute) 284 200 - 950 cells/uL    Eosinophils (Absolute) 152 15 - 500 cells/uL    Basophils ABS 32 0 - 200 cells/uL    Neutrophils 55 6 %    Lymphocytes 32 7 %    Monocytes 7 1 %    Eosinophils 3 8 %    Basophils PCT 0 8 %   Hemoglobin A1c (w/out EAG)   Result Value Ref Range    Hemoglobin A1C 6 5 (H) <5 7 % of total Hgb       Orders Placed This Encounter   Procedures   • influenza vaccine, high-dose, PF 0 7 mL (FLUZONE HIGH-DOSE)   • CBC and differential   • Comprehensive metabolic panel   • Hemoglobin A1C   • Lipid Panel with Direct LDL reflex   • TSH, 3rd generation with Free T4 reflex   • Microalbumin, Random Urine (W/Creatinine)       ALLERGIES:  No Known Allergies    Current Medications     Current Outpatient Medications   Medication Sig Dispense Refill   • Calcium Carbonate-Vitamin D 600-200 MG-UNIT TABS Take 1 tablet by mouth 2 (two) times a day     • Cholecalciferol (VITAMIN D3) 1000 units CAPS Take by mouth daily       • denosumab (PROLIA) 60 mg/mL Inject under the skin every 6 (six) months       • hydrochlorothiazide (HYDRODIURIL) 25 mg tablet Take 1 tablet (25 mg total) by mouth daily 30 tablet 5   • losartan (COZAAR) 50 mg tablet Take 1 tablet (50 mg total) by mouth daily 30 tablet 5   • Multiple Vitamins-Minerals (DAILY MULTI PO) Take by mouth daily       • potassium chloride (K-DUR,KLOR-CON) 10 mEq tablet Take 1 tablet (10 mEq total) by mouth daily 30 tablet 5     Current Facility-Administered Medications   Medication Dose Route Frequency Provider Last Rate Last Admin   • denosumab (PROLIA) subcutaneous injection 60 mg  60 mg Subcutaneous Q6 Months Kahlil Copeland MD   60 mg at 11/10/22 1536         Health Maintenance     Health Maintenance   Topic Date Due   • COVID-19 Vaccine (4 - Booster for Moderna series) 05/03/2022   • PT PLAN OF CARE  07/30/2022   • DM Eye Exam  01/04/2023   • Diabetic Foot Exam  02/03/2023   • Breast Cancer Screening: Mammogram  04/21/2023   • HEMOGLOBIN A1C  04/25/2023   • Urinary Incontinence Screening  05/13/2023   • Medicare Annual Wellness Visit (AWV)  05/13/2023   • Depression Screening  06/20/2023   • Fall Risk  06/29/2023   • BMI: Adult  11/14/2023   • BMI: Followup Plan  11/15/2023   • Colorectal Cancer Screening  07/19/2024   • Hepatitis C Screening  Completed   • Osteoporosis Screening  Completed   • Pneumococcal Vaccine: 65+ Years  Completed   • Influenza Vaccine  Completed   • HIB Vaccine  Aged Out   • Hepatitis B Vaccine  Aged Out   • IPV Vaccine  Aged Out   • Hepatitis A Vaccine  Aged Out • Meningococcal ACWY Vaccine  Aged Out   • HPV Vaccine  Aged Out     Immunization History   Administered Date(s) Administered   • COVID-19 MODERNA VACC 0 5 ML IM 02/06/2021, 03/09/2021, 01/03/2022   • INFLUENZA 10/24/2007, 10/23/2017   • Influenza Split High Dose Preservative Free IM 10/24/2018   • Influenza, high dose seasonal 0 7 mL 09/26/2019, 09/24/2021, 11/14/2022   • Influenza, seasonal, injectable 10/24/2007   • Pneumococcal Conjugate 13-Valent 10/23/2017   • Pneumococcal Polysaccharide PPV23 10/24/2018   • Tdap 04/30/2019   • Zoster 06/17/2016       Chhaya Palmer PA-C  11/15/2022 10:11 AM  [facility]

## 2022-11-14 ENCOUNTER — OFFICE VISIT (OUTPATIENT)
Dept: FAMILY MEDICINE CLINIC | Facility: CLINIC | Age: 77
End: 2022-11-14

## 2022-11-14 VITALS
BODY MASS INDEX: 30.9 KG/M2 | SYSTOLIC BLOOD PRESSURE: 126 MMHG | TEMPERATURE: 97 F | HEART RATE: 79 BPM | RESPIRATION RATE: 14 BRPM | OXYGEN SATURATION: 98 % | DIASTOLIC BLOOD PRESSURE: 70 MMHG | HEIGHT: 60 IN | WEIGHT: 157.4 LBS

## 2022-11-14 DIAGNOSIS — E87.6 HYPOKALEMIA: ICD-10-CM

## 2022-11-14 DIAGNOSIS — M81.0 AGE-RELATED OSTEOPOROSIS WITHOUT CURRENT PATHOLOGICAL FRACTURE: ICD-10-CM

## 2022-11-14 DIAGNOSIS — Z23 NEED FOR INFLUENZA VACCINATION: ICD-10-CM

## 2022-11-14 DIAGNOSIS — D69.6 THROMBOCYTOPENIA (HCC): ICD-10-CM

## 2022-11-14 DIAGNOSIS — I10 ESSENTIAL HYPERTENSION: ICD-10-CM

## 2022-11-14 DIAGNOSIS — D64.9 LOW HEMATOCRIT: ICD-10-CM

## 2022-11-14 DIAGNOSIS — E11.9 TYPE 2 DIABETES MELLITUS WITHOUT COMPLICATION, WITHOUT LONG-TERM CURRENT USE OF INSULIN (HCC): Primary | ICD-10-CM

## 2022-11-14 DIAGNOSIS — E66.9 OBESITY (BMI 30.0-34.9): ICD-10-CM

## 2022-11-14 RX ORDER — LOSARTAN POTASSIUM 50 MG/1
50 TABLET ORAL DAILY
Qty: 30 TABLET | Refills: 5 | Status: SHIPPED | OUTPATIENT
Start: 2022-11-14

## 2022-11-14 RX ORDER — HYDROCHLOROTHIAZIDE 25 MG/1
25 TABLET ORAL DAILY
Qty: 30 TABLET | Refills: 5 | Status: SHIPPED | OUTPATIENT
Start: 2022-11-14

## 2022-11-14 RX ORDER — POTASSIUM CHLORIDE 750 MG/1
10 TABLET, EXTENDED RELEASE ORAL DAILY
Qty: 30 TABLET | Refills: 5 | Status: SHIPPED | OUTPATIENT
Start: 2022-11-14

## 2022-11-15 ENCOUNTER — OFFICE VISIT (OUTPATIENT)
Dept: RHEUMATOLOGY | Facility: CLINIC | Age: 77
End: 2022-11-15

## 2022-11-15 VITALS
DIASTOLIC BLOOD PRESSURE: 68 MMHG | WEIGHT: 154 LBS | BODY MASS INDEX: 30.23 KG/M2 | SYSTOLIC BLOOD PRESSURE: 118 MMHG | HEIGHT: 60 IN

## 2022-11-15 DIAGNOSIS — Z79.899 HIGH RISK MEDICATION USE: ICD-10-CM

## 2022-11-15 DIAGNOSIS — M81.0 AGE-RELATED OSTEOPOROSIS WITHOUT CURRENT PATHOLOGICAL FRACTURE: Primary | ICD-10-CM

## 2022-11-15 DIAGNOSIS — E55.9 VITAMIN D DEFICIENCY: ICD-10-CM

## 2022-11-15 DIAGNOSIS — R79.9 ABNORMAL FINDING OF BLOOD CHEMISTRY, UNSPECIFIED: ICD-10-CM

## 2022-11-15 NOTE — PATIENT INSTRUCTIONS
Continue 1,200mg of calcium daily  Continue 1,000 units of Vit   D daily  Continue Prolia injections every 6 months  Do weight-bearing exercise    Return to clinic on 5/11/23 at 1pm for Prolia only injection  Return to clinic in 1 year for Prolia + provider visit

## 2022-11-15 NOTE — PROGRESS NOTES
Assessment and Plan:   Dinesh Calix is a 69 y o   female who presents as a Rheumatology evaluation for osteoporosis; wants to transfer care to [facility], since office is closer  Received Prolia this past Thursday with Dr Dina Madison, was her 17th shot  Was on Reclast and Fosamax in the past; Fosamax was hard on her stomach  Broke 3 ribs in the past when she fell over her dog  Continue 1,200mg of calcium daily  Continue 1,000 units of Vit  D daily  Continue Prolia injections every 6 months  Do labs before next Prolia injection    Return to clinic on 5/11/23 at 1pm for Prolia only injection  Return to clinic in 1 year for Prolia + provider visit    Plan:  Diagnoses and all orders for this visit:    Age-related osteoporosis without current pathological fracture  -     CBC and differential  -     Comprehensive metabolic panel  -     Vitamin D 25 hydroxy    Vitamin D deficiency  -     Vitamin D 25 hydroxy    Abnormal finding of blood chemistry, unspecified   -     CBC and differential    High risk medication use  Follow-up plan:   Return to clinic on 5/11/23 at 1pm for Prolia only injection  Return to clinic in 1 year for Prolia + provider visit        HPI  Dinesh Calix is a 69 y o   female who presents as a Rheumatology consult referred by Self, Referral for evaluation of osteoporosis  Transferring care from Dr Dina Madison due to location preference  Has tried Fosamax in the past; was hard on her stomach  Also completed Reclast in past  Received 17 Prolia injections so far; last one being 11/10th  Review of Systems  Review of Systems   Constitutional: Negative for fatigue  HENT: Negative for mouth sores  Respiratory: Negative for cough and shortness of breath  Cardiovascular: Negative for chest pain and leg swelling  Gastrointestinal: Negative for abdominal pain, constipation and diarrhea  Musculoskeletal: Negative for arthralgias, back pain, joint swelling and myalgias     Skin: Negative for color change and rash  Neurological: Negative for weakness  Hematological: Negative for adenopathy  Psychiatric/Behavioral: Negative for sleep disturbance  Reviewed and agree  Allergies  No Known Allergies    Home Medications    Current Outpatient Medications:   •  Calcium Carbonate-Vitamin D 600-200 MG-UNIT TABS, Take 1 tablet by mouth 2 (two) times a day, Disp: , Rfl:   •  Cholecalciferol (VITAMIN D3) 1000 units CAPS, Take by mouth daily  , Disp: , Rfl:   •  denosumab (PROLIA) 60 mg/mL, Inject under the skin every 6 (six) months  , Disp: , Rfl:   •  hydrochlorothiazide (HYDRODIURIL) 25 mg tablet, Take 1 tablet (25 mg total) by mouth daily, Disp: 30 tablet, Rfl: 5  •  losartan (COZAAR) 50 mg tablet, Take 1 tablet (50 mg total) by mouth daily, Disp: 30 tablet, Rfl: 5  •  Multiple Vitamins-Minerals (DAILY MULTI PO), Take by mouth daily  , Disp: , Rfl:   •  potassium chloride (K-DUR,KLOR-CON) 10 mEq tablet, Take 1 tablet (10 mEq total) by mouth daily, Disp: 30 tablet, Rfl: 5    Current Facility-Administered Medications:   •  denosumab (PROLIA) subcutaneous injection 60 mg, 60 mg, Subcutaneous, Q6 Months, Grover Lord MD, 60 mg at 11/10/22 1536    Past Medical History  Past Medical History:   Diagnosis Date   • Chicken pox    • Diabetes mellitus ([facility] [address] )    • Fall 05/01/2019   • Hypertension    • Kidney stones    • Malignant neoplasm of skin     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   • Osteoarthritis    • Osteoporosis        Past Surgical History   Past Surgical History:   Procedure Laterality Date   • COLONOSCOPY  2018   • COLONOSCOPY  2015    COMPLETE; DUE 3 YRS   • COLONOSCOPY  06/20/2018    polyp x1   • DILATION AND CURETTAGE OF UTERUS  08/2013    THICKENED ENDO ON USG, CVX STENOSIS, PATH: BENIGN POLYP FRAGMENTS AND INACTIVE ENDOMETRIUM   NO NEOPLASIA ; IMPRESSION: 10/27/14; Apryl Hernandez   • HYSTEROSCOPY     • MOHS SURGERY  02/19/2020    [email or web address] on face   • TONSILLECTOMY         Family History    Family History   Problem Relation Age of Onset   • Kidney cancer Mother 79        RENAL CANCER   • Hypertension Mother    • Skin cancer Mother    • Osteoporosis Mother    • Osteoporotic fracture Mother         hip - age 80    • Osteoporotic fracture Paternal Grandmother          Social History  Social History     Substance and Sexual Activity   Alcohol Use Yes    Comment: Wine for Special Occassion     Social History     Substance and Sexual Activity   Drug Use Never     Social History     Tobacco Use   Smoking Status Former   • Packs/day: 0 50   • Years: 5 00   • Pack years: 2 50   • Types: Cigarettes   • Quit date: 1970   • Years since quittin 9   Smokeless Tobacco Never   Tobacco Comments    STARTED AT AGE 17  STOPPED AT AGE 23         Objective:  Vitals:    11/15/22 1336   BP: 118/68   Weight: 69 9 kg (154 lb)   Height: 4' 11 5" (1 511 m)       Physical Exam  Constitutional:       General: She is not in acute distress  HENT:      Head: Normocephalic and atraumatic  Eyes:      Conjunctiva/sclera: Conjunctivae normal    Cardiovascular:      Rate and Rhythm: Normal rate and regular rhythm  Heart sounds: S1 normal and S2 normal      No friction rub  Pulmonary:      Effort: Pulmonary effort is normal  No respiratory distress  Breath sounds: Normal breath sounds  No wheezing, rhonchi or rales  Musculoskeletal:      Cervical back: Neck supple  Skin:     General: Skin is warm and dry  Coloration: Skin is not pale  Findings: No rash  Neurological:      Mental Status: She is alert  Mental status is at baseline  Psychiatric:         Mood and Affect: Mood normal          Behavior: Behavior normal        Reviewed labs and imaging  Imaging:   DXA scan 10/14/22  RESULTS:   LUMBAR SPINE:  L1-L4:  BMD 0 991 gm/cm2  T-score -0 5 and on the prior study the T score was -0 5  Z-score 2 0     LEFT TOTAL HIP:  BMD 0 848 gm/cm2  T-score -0 8 and on the prior study the T score was -0 8    Z-score 1 1     LEFT FEMORAL NECK:  BMD 0 659 gm/cm2  T-score -1 7 and on the prior study the T score was -1 8   Z-score 0 4     IMPRESSION:  1  Based on the [facility] classification, this study identifies a diagnosis of low bone mass, notable at the femoral neck area but the patient may be considered at risk for fracture as evidenced by the increased hip fracture risk noted on the Frax score  XR lumbar spine 9/24/21  Multilevel degenerative changes with exaggerated lordosis  XR left hip 9/24/21  Mild left hip osteoarthritis is seen  DXA scan 10/5/20  Impression:   The examination is reviewed using the World Health Organization criteria  There is osteopenia as measured by the bone mineral density of the femoral neck  1  The lumbar spine has bone mineral density that falls within the normal range  2  The hip has bone mineral density of osteopenia with measurements that show   increased risk for fracture      3  A FRAX is not reported because the patient is being treated for osteoporosis  Labs:   Orders Only on 10/25/2022   Component Date Value Ref Range Status   • Glucose, Random 10/25/2022 120 (H)  65 - 99 mg/dL Final    Comment:               Fasting reference interval     For someone without known diabetes, a glucose value  between 100 and 125 mg/dL is consistent with  prediabetes and should be confirmed with a  follow-up test         • BUN 10/25/2022 13  7 - 25 mg/dL Final   • Creatinine 10/25/2022 0 79  0 60 - 1 00 mg/dL Final   • eGFR 10/25/2022 77  > OR = 60 mL/min/1 73m2 Final    Comment: The eGFR is based on the CKD-EPI 2021 equation  To calculate   the new eGFR from a previous Creatinine or Cystatin C  result, go to [employer] at  org/professionals/  [email or web address]     • SL AMB BUN/CREATININE RATIO 08/01/6239 NOT APPLICABLE  6 - 22 (calc) Final   • Sodium 10/25/2022 136  135 - 146 mmol/L Final   • Potassium 10/25/2022 3 7  3 5 - 5 3 mmol/L Final   • Chloride 10/25/2022 103  98 - 110 mmol/L Final   • CO2 10/25/2022 26  20 - 32 mmol/L Final   • Calcium 10/25/2022 10 1  8 6 - 10 4 mg/dL Final   • Protein, Total 10/25/2022 6 4  6 1 - 8 1 g/dL Final   • Albumin 10/25/2022 3 3 (L)  3 6 - 5 1 g/dL Final   • Globulin 10/25/2022 3 1  1 9 - 3 7 g/dL (calc) Final   • Albumin/Globulin Ratio 10/25/2022 1 1  1 0 - 2 5 (calc) Final   • TOTAL BILIRUBIN 10/25/2022 1 9 (H)  0 2 - 1 2 mg/dL Final   • Alkaline Phosphatase 10/25/2022 81  37 - 153 U/L Final   • AST 10/25/2022 40 (H)  10 - 35 U/L Final   • ALT 10/25/2022 22  6 - 29 U/L Final   • White Blood Cell Count 10/25/2022 4 0  3 8 - 10 8 Thousand/uL Final   • Red Blood Cell Count 10/25/2022 3 32 (L)  3 80 - 5 10 Million/uL Final   • Hemoglobin 10/25/2022 12 1  11 7 - 15 5 g/dL Final   • HCT 10/25/2022 33 6 (L)  35 0 - 45 0 % Final   • MCV 10/25/2022 101 2 (H)  80 0 - 100 0 fL Final   • MCH 10/25/2022 36 4 (H)  27 0 - 33 0 pg Final   • MCHC 10/25/2022 36 0  32 0 - 36 0 g/dL Final   • RDW 10/25/2022 12 4  11 0 - 15 0 % Final   • Platelet Count 30/66/6242 114 (L)  140 - 400 Thousand/uL Final   • SL AMB MPV 10/25/2022 10 7  7 5 - 12 5 fL Final   • Neutrophils (Absolute) 10/25/2022 2,224  1,500 - 7,800 cells/uL Final   • Lymphocytes (Absolute) 10/25/2022 1,308  850 - 3,900 cells/uL Final   • Monocytes (Absolute) 10/25/2022 284  200 - 950 cells/uL Final   • Eosinophils (Absolute) 10/25/2022 152  15 - 500 cells/uL Final   • Basophils ABS 10/25/2022 32  0 - 200 cells/uL Final   • Neutrophils 10/25/2022 55 6  % Final   • Lymphocytes 10/25/2022 32 7  % Final   • Monocytes 10/25/2022 7 1  % Final   • Eosinophils 10/25/2022 3 8  % Final   • Basophils PCT 10/25/2022 0 8  % Final   • Hemoglobin A1C 10/25/2022 6 5 (H)  <5 7 % of total Hgb Final    Comment: For someone without known diabetes, a hemoglobin A1c  value of 6 5% or greater indicates that they may have   diabetes and this should be confirmed with a follow-up   test      For someone with known diabetes, a value <7% indicates   that their diabetes is well controlled and a value   greater than or equal to 7% indicates suboptimal   control  A1c targets should be individualized based on   duration of diabetes, age, comorbid conditions, and   other considerations  Currently, no consensus exists regarding use of  hemoglobin A1c for diagnosis of diabetes for children

## 2022-11-15 NOTE — ASSESSMENT & PLAN NOTE
BMI Counseling: Body mass index is 31 26 kg/m²  The BMI is above normal  Nutrition recommendations include moderation in carbohydrate intake  Exercise recommendations include exercising 3-5 times per week

## 2022-11-15 NOTE — ASSESSMENT & PLAN NOTE
Well controlled  She will continue to the remain off medication and use diet for her blood sugar control  We did review that there was an increase in her A1c from 6 0% in April  She was encouraged to be mindful of her carbohydrate intake to avoid continued increases in her A1c  Her she is up-to-date on her foot exam and eye exam   Will continue to monitor     Lab Results   Component Value Date    HGBA1C 6 5 (H) 10/25/2022

## 2022-11-15 NOTE — ASSESSMENT & PLAN NOTE
Well controlled  Continue losartan 50 mg daily and hydrochlorothiazide 25 mg daily  Will continue to monitor

## 2022-11-15 NOTE — ASSESSMENT & PLAN NOTE
Currently on Prolia through rheumatology  She will be likely transitioning rheumatologist due to proximity to her home  Continue calcium and vitamin-D supplementation  She is up-to-date on DEXA scans  Her last DEXA was done 1 month ago

## 2022-11-15 NOTE — ASSESSMENT & PLAN NOTE
Well controlled on last labs she with a level of 3 7  Continue potassium chloride 10 mil equivalents daily  Will continue to monitor

## 2022-12-09 LAB
LEFT EYE DIABETIC RETINOPATHY: NORMAL
RIGHT EYE DIABETIC RETINOPATHY: NORMAL
SEVERITY (EYE EXAM): NORMAL

## 2023-01-31 ENCOUNTER — APPOINTMENT (OUTPATIENT)
Dept: RADIOLOGY | Facility: MEDICAL CENTER | Age: 78
End: 2023-01-31

## 2023-01-31 ENCOUNTER — OFFICE VISIT (OUTPATIENT)
Dept: FAMILY MEDICINE CLINIC | Facility: CLINIC | Age: 78
End: 2023-01-31

## 2023-01-31 VITALS
DIASTOLIC BLOOD PRESSURE: 68 MMHG | OXYGEN SATURATION: 97 % | BODY MASS INDEX: 31.61 KG/M2 | SYSTOLIC BLOOD PRESSURE: 112 MMHG | HEART RATE: 95 BPM | WEIGHT: 161 LBS | TEMPERATURE: 98 F | HEIGHT: 60 IN

## 2023-01-31 DIAGNOSIS — E11.9 TYPE 2 DIABETES MELLITUS WITHOUT COMPLICATION, WITHOUT LONG-TERM CURRENT USE OF INSULIN (HCC): ICD-10-CM

## 2023-01-31 DIAGNOSIS — D69.6 THROMBOCYTOPENIA (HCC): ICD-10-CM

## 2023-01-31 DIAGNOSIS — M25.561 ACUTE PAIN OF RIGHT KNEE: ICD-10-CM

## 2023-01-31 DIAGNOSIS — I10 ESSENTIAL HYPERTENSION: ICD-10-CM

## 2023-01-31 DIAGNOSIS — M25.561 ACUTE PAIN OF RIGHT KNEE: Primary | ICD-10-CM

## 2023-01-31 RX ORDER — PREDNISONE 10 MG/1
TABLET ORAL
Qty: 21 TABLET | Refills: 0 | Status: SHIPPED | OUTPATIENT
Start: 2023-01-31

## 2023-01-31 NOTE — ASSESSMENT & PLAN NOTE
Clinical presentation consistent with a meniscal tear  We will do x-ray of the right knee to exclude any gross changes  She has history of osteoporosis we have to exclude osseous traumatic changes  In the meantime we will start with prednisone taper for 7 days  Side effects discussed  He will also start to use knee brace  We will proceed further depending on the results of her test results and response to therapy

## 2023-01-31 NOTE — ASSESSMENT & PLAN NOTE
Seems to be controlled right now  Continue the same therapy    Lab Results   Component Value Date    HGBA1C 6 5 (H) 10/25/2022

## 2023-01-31 NOTE — PROGRESS NOTES
Assessment/Plan:    Type 2 diabetes mellitus without complication, without long-term current use of insulin (HCC)  Seems to be controlled right now  Continue the same therapy  Lab Results   Component Value Date    HGBA1C 6 5 (H) 10/25/2022       Essential hypertension  Very well controlled on current meds  Acute pain of right knee  Clinical presentation consistent with a meniscal tear  We will do x-ray of the right knee to exclude any gross changes  She has history of osteoporosis we have to exclude osseous traumatic changes  In the meantime we will start with prednisone taper for 7 days  Side effects discussed  He will also start to use knee brace  We will proceed further depending on the results of her test results and response to therapy  Diagnoses and all orders for this visit:    Acute pain of right knee  -     XR knee 3 vw right non injury; Future  -     predniSONE 10 mg tablet; Take 4 pills on day 1 and 2, 3 pills on days 3, 4 and 5, 2 pills on day 6 and 7 then stop  Thrombocytopenia ([address] )    Type 2 diabetes mellitus without complication, without long-term current use of insulin (HCC)    Essential hypertension          Subjective:      Patient ID: Guero Velez is a 69 y o  female  Patient came today with complains of right knee pain that started around 10 days ago, he denies any trauma but she remembers the moment when this all started that is when she was leaning forward to  the air humidifier  The following portions of the patient's history were reviewed and updated as appropriate: allergies, current medications, past family history, past medical history, past social history, past surgical history, and problem list     Review of Systems   Musculoskeletal: Positive for arthralgias, gait problem and joint swelling  Negative for back pain  Skin: Negative for color change, pallor and wound           Objective:      /68 (BP Location: Left arm, Patient Position: Sitting, Cuff Size: Large)   Pulse 95   Temp 98 °F (36 7 °C) (Temporal)   Ht 4' 11 5" (1 511 m)   Wt 73 kg (161 lb)   SpO2 97%   BMI 31 97 kg/m²     No Known Allergies       Current Outpatient Medications:   •  Calcium Carbonate-Vitamin D 600-200 MG-UNIT TABS, Take 1 tablet by mouth 2 (two) times a day, Disp: , Rfl:   •  Cholecalciferol (VITAMIN D3) 1000 units CAPS, Take by mouth daily  , Disp: , Rfl:   •  denosumab (PROLIA) 60 mg/mL, Inject under the skin every 6 (six) months  , Disp: , Rfl:   •  hydrochlorothiazide (HYDRODIURIL) 25 mg tablet, Take 1 tablet (25 mg total) by mouth daily, Disp: 30 tablet, Rfl: 5  •  losartan (COZAAR) 50 mg tablet, Take 1 tablet (50 mg total) by mouth daily, Disp: 30 tablet, Rfl: 5  •  Multiple Vitamins-Minerals (DAILY MULTI PO), Take by mouth daily  , Disp: , Rfl:   •  potassium chloride (K-DUR,KLOR-CON) 10 mEq tablet, Take 1 tablet (10 mEq total) by mouth daily, Disp: 30 tablet, Rfl: 5  •  predniSONE 10 mg tablet, Take 4 pills on day 1 and 2, 3 pills on days 3, 4 and 5, 2 pills on day 6 and 7 then stop , Disp: 21 tablet, Rfl: 0    Current Facility-Administered Medications:   •  denosumab (PROLIA) subcutaneous injection 60 mg, 60 mg, Subcutaneous, Q6 Months, Monico Mcneil MD, 60 mg at 11/10/22 7484     There are no Patient Instructions on file for this visit  Physical Exam  Constitutional:       General: She is not in acute distress  Appearance: She is not ill-appearing or toxic-appearing  Musculoskeletal:         General: Swelling, tenderness (Tenderness medial aspect of the right knee ) and deformity (Chronic likely due to osteoarthritis) present  No signs of injury

## 2023-03-06 ENCOUNTER — OFFICE VISIT (OUTPATIENT)
Dept: FAMILY MEDICINE CLINIC | Facility: CLINIC | Age: 78
End: 2023-03-06

## 2023-03-06 VITALS
HEIGHT: 60 IN | SYSTOLIC BLOOD PRESSURE: 120 MMHG | TEMPERATURE: 98.2 F | RESPIRATION RATE: 16 BRPM | WEIGHT: 157.2 LBS | DIASTOLIC BLOOD PRESSURE: 68 MMHG | OXYGEN SATURATION: 95 % | BODY MASS INDEX: 30.86 KG/M2 | HEART RATE: 75 BPM

## 2023-03-06 DIAGNOSIS — M25.561 ACUTE PAIN OF RIGHT KNEE: Primary | ICD-10-CM

## 2023-03-06 NOTE — PROGRESS NOTES
Diabetic Foot Exam    Patient's shoes and socks removed  Right Foot/Ankle   Right Foot Inspection  Skin Exam: skin normal and skin intact  No dry skin, no warmth, no callus, no erythema, no maceration, no abnormal color, no pre-ulcer, no ulcer and no callus  Sensory   Monofilament testing: intact    Left Foot/Ankle  Left Foot Inspection  Skin Exam: skin normal and skin intact  No dry skin, no warmth, no erythema, no maceration, normal color, no pre-ulcer, no ulcer and no callus  Sensory   Monofilament testing: intact         FAMILY PRACTICE OFFICE VISIT  [facility] - [facility]       NAME: Jordi Couch  AGE: 69 y o  SEX: female       : 1945        MRN: [id number]    DATE: 3/7/2023  TIME: 11:09 AM    Assessment and Plan     Problem List Items Addressed This Visit        Other    Acute pain of right knee - Primary     Reviewed right knee x-ray showed "mild tricompartmental degenerative changes and Ermelinda-Stieda calcifications consistent with MCL injury " Reviewed that her knee exam is unremarkable for any significant abnormalities  She was referred to PT  Consider MRI if not improving  Relevant Orders    Ambulatory Referral to Physical Therapy             Chief Complaint     Chief Complaint   Patient presents with   • Leg Pain     Right knee and leg pain       History of Present Illness   Jordi Couch is a 69y o -year-old female who presents for right knee pain  Patient notes that she has trouble with right knee pain  She notes that the pain is worse than before  She has not been doing therapy and reports that she was under the impression that the xray was normal  She was told to get a brace but that makes her ankle swollen and numb  She denies redness but does have swelling of the right knee and goes into the leg  Review of Systems   Review of Systems   Cardiovascular: Positive for leg swelling     Musculoskeletal: Positive for arthralgias and joint swelling  Skin: Negative for color change and rash  Active Problem List     Patient Active Problem List   Diagnosis   • Dietary calcium deficiency   • Obesity (BMI 30 0-34  9)   • Osteoporosis   • Hx of colonic polyps   • Heart murmur   • Type 2 diabetes mellitus without complication, without long-term current use of insulin (HCC)   • Thrombocytopenia (HCC)   • Essential hypertension   • Hypokalemia   • Low hematocrit   • Leg swelling   • Primary generalized (osteo)arthritis   • Lumbar spondylosis   • Acute left-sided low back pain without sciatica   • Acute pain of right knee         Past Medical History:  Past Medical History:   Diagnosis Date   • Chicken pox    • Diabetes mellitus ([facility] [address] )    • Fall 05/01/2019   • Hypertension    • Kidney stones    • Malignant neoplasm of skin     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   • Osteoarthritis    • Osteoporosis        Past Surgical History:  Past Surgical History:   Procedure Laterality Date   • COLONOSCOPY  2018   • COLONOSCOPY  2015    COMPLETE; DUE 3 YRS   • COLONOSCOPY  06/20/2018    polyp x1   • DILATION AND CURETTAGE OF UTERUS  08/2013    THICKENED ENDO ON USG, CVX STENOSIS, PATH: BENIGN POLYP FRAGMENTS AND INACTIVE ENDOMETRIUM   NO NEOPLASIA ; IMPRESSION: 10/27/14; Mesha Stein   • HYSTEROSCOPY     • MOHS SURGERY  02/19/2020    BCC on face   • TONSILLECTOMY         Family History:  Family History   Problem Relation Age of Onset   • Kidney cancer Mother 79        RENAL CANCER   • Hypertension Mother    • Skin cancer Mother    • Osteoporosis Mother    • Osteoporotic fracture Mother         hip - age 80    • Osteoporotic fracture Paternal Grandmother        Social History:  Social History     Socioeconomic History   • Marital status: Single     Spouse name: Not on file   • Number of children: Not on file   • Years of education: Not on file   • Highest education level: Not on file   Occupational History   • Occupation: [employer]   Tobacco Use   • Smoking status: Former     Packs/day: 0 50     Years: 5 00     Pack years: 2 50     Types: Cigarettes     Quit date: 1970     Years since quittin 2   • Smokeless tobacco: Never   • Tobacco comments:     STARTED AT AGE 17  STOPPED AT AGE 26     Vaping Use   • Vaping Use: Never used   Substance and Sexual Activity   • Alcohol use: Yes     Comment: Wine for Special Occassion   • Drug use: Never   • Sexual activity: Not on file   Other Topics Concern   • Not on file   Social History Narrative    EXERCISES MODERATELY LESS THAN 3 TIMES WEEK     Social Determinants of Health     Financial Resource Strain: Not on file   Food Insecurity: Not on file   Transportation Needs: Not on file   Physical Activity: Not on file   Stress: Not on file   Social Connections: Not on file   Intimate Partner Violence: Not on file   Housing Stability: Not on file       Objective     Vitals:    23 1400   BP: 120/68   BP Location: Left arm   Cuff Size: Large   Pulse: 75   Resp: 16   Temp: 98 2 °F (36 8 °C)   TempSrc: Tympanic   SpO2: 95%   Weight: 71 3 kg (157 lb 3 2 oz)   Height: 4' 11 5" (1 511 m)     Wt Readings from Last 3 Encounters:   23 71 3 kg (157 lb 3 2 oz)   23 73 kg (161 lb)   11/15/22 69 9 kg (154 lb)       Physical Exam  Vitals reviewed  Constitutional:       General: She is not in acute distress  Appearance: Normal appearance  She is obese  She is not ill-appearing  Cardiovascular:      Rate and Rhythm: Normal rate and regular rhythm  Pulses: Normal pulses  Heart sounds: Normal heart sounds  No murmur heard  Musculoskeletal:         General: Swelling (of the right knee, especially superior and mesial to the patella) and tenderness (lateral and medial right knee) present  Right lower leg: No edema  Left lower leg: No edema        Comments: No erythema noted of either knee, negative Lenard's bilaterally, negative anterior and posterior drawer bilaterally, negative medial and lateral distraction bilaterally (right knee just tender on thigh where hand is located during testing on the right), negative Apley compression and distraction bilaterally     Feet:      Right foot:      Skin integrity: No ulcer, skin breakdown, erythema, warmth, callus or dry skin  Left foot:      Skin integrity: No ulcer, skin breakdown, erythema, warmth, callus or dry skin  Skin:     General: Skin is warm and dry  Findings: No bruising or erythema  Neurological:      Mental Status: She is alert  Psychiatric:         Mood and Affect: Mood normal          Behavior: Behavior normal          Thought Content:  Thought content normal          Judgment: Judgment normal          Pertinent Laboratory/Diagnostic Studies:  Lab Results   Component Value Date    BUN 13 10/25/2022    CREATININE 0 79 10/25/2022    CALCIUM 10 1 10/25/2022    K 3 7 10/25/2022    CO2 26 10/25/2022     10/25/2022     Lab Results   Component Value Date    ALT 22 10/25/2022    AST 40 (H) 10/25/2022    ALKPHOS 81 10/25/2022       Lab Results   Component Value Date    WBC 4 0 10/25/2022    HGB 12 1 10/25/2022    HCT 33 6 (L) 10/25/2022     2 (H) 10/25/2022     (L) 10/25/2022     Lab Results   Component Value Date    TRIG 69 04/22/2022     Lab Results   Component Value Date    HDL 60 04/22/2022     Lab Results   Component Value Date    LDLCALC 82 04/22/2022     Lab Results   Component Value Date    HGBA1C 6 5 (H) 10/25/2022       Results for orders placed or performed in visit on 10/25/22   Comprehensive metabolic panel   Result Value Ref Range    Glucose, Random 120 (H) 65 - 99 mg/dL    BUN 13 7 - 25 mg/dL    Creatinine 0 79 0 60 - 1 00 mg/dL    eGFR 77 > OR = 60 mL/min/1 73m2    SL AMB BUN/CREATININE RATIO NOT APPLICABLE 6 - 22 (calc)    Sodium 136 135 - 146 mmol/L    Potassium 3 7 3 5 - 5 3 mmol/L    Chloride 103 98 - 110 mmol/L    CO2 26 20 - 32 mmol/L    Calcium 10 1 8 6 - 10 4 mg/dL    Protein, Total 6 4 6 1 - 8 1 g/dL    Albumin 3 3 (L) 3 6 - 5 1 g/dL    Globulin 3 1 1 9 - 3 7 g/dL (calc)    Albumin/Globulin Ratio 1 1 1 0 - 2 5 (calc)    TOTAL BILIRUBIN 1 9 (H) 0 2 - 1 2 mg/dL    Alkaline Phosphatase 81 37 - 153 U/L    AST 40 (H) 10 - 35 U/L    ALT 22 6 - 29 U/L   CBC and differential   Result Value Ref Range    White Blood Cell Count 4 0 3 8 - 10 8 Thousand/uL    Red Blood Cell Count 3 32 (L) 3 80 - 5 10 Million/uL    Hemoglobin 12 1 11 7 - 15 5 g/dL    HCT 33 6 (L) 35 0 - 45 0 %     2 (H) 80 0 - 100 0 fL    MCH 36 4 (H) 27 0 - 33 0 pg    MCHC 36 0 32 0 - 36 0 g/dL    RDW 12 4 11 0 - 15 0 %    Platelet Count 538 (L) 140 - 400 Thousand/uL    SL AMB MPV 10 7 7 5 - 12 5 fL    Neutrophils (Absolute) 2,224 1,500 - 7,800 cells/uL    Lymphocytes (Absolute) 1,308 850 - 3,900 cells/uL    Monocytes (Absolute) 284 200 - 950 cells/uL    Eosinophils (Absolute) 152 15 - 500 cells/uL    Basophils ABS 32 0 - 200 cells/uL    Neutrophils 55 6 %    Lymphocytes 32 7 %    Monocytes 7 1 %    Eosinophils 3 8 %    Basophils PCT 0 8 %   Hemoglobin A1c (w/out EAG)   Result Value Ref Range    Hemoglobin A1C 6 5 (H) <5 7 % of total Hgb         ALLERGIES:  No Known Allergies    Current Medications     Current Outpatient Medications   Medication Sig Dispense Refill   • Calcium Carbonate-Vitamin D 600-200 MG-UNIT TABS Take 1 tablet by mouth 2 (two) times a day     • Cholecalciferol (VITAMIN D3) 1000 units CAPS Take by mouth daily       • denosumab (PROLIA) 60 mg/mL Inject under the skin every 6 (six) months       • hydrochlorothiazide (HYDRODIURIL) 25 mg tablet Take 1 tablet (25 mg total) by mouth daily 30 tablet 5   • losartan (COZAAR) 50 mg tablet Take 1 tablet (50 mg total) by mouth daily 30 tablet 5   • Multiple Vitamins-Minerals (DAILY MULTI PO) Take by mouth daily       • potassium chloride (K-DUR,KLOR-CON) 10 mEq tablet Take 1 tablet (10 mEq total) by mouth daily 30 tablet 5   • predniSONE 10 mg tablet Take 4 pills on day 1 and 2, 3 pills on days 3, 4 and 5, 2 pills on day 6 and 7 then stop   21 tablet 0     Current Facility-Administered Medications   Medication Dose Route Frequency Provider Last Rate Last Admin   • denosumab (PROLIA) subcutaneous injection 60 mg  60 mg Subcutaneous Q6 Months Roney Cobb MD   60 mg at 11/10/22 [address] Maintenance   Topic Date Due   • COVID-19 Vaccine (4 - Booster for Moderna series) 02/28/2022   • PT PLAN OF CARE  07/30/2022   • DM Eye Exam  01/04/2023   • Diabetic Foot Exam  02/03/2023   • Breast Cancer Screening: Mammogram  04/21/2023   • Kidney Health Evaluation: Microalbumin/Creatinine Ratio  04/22/2023   • HEMOGLOBIN A1C  04/25/2023   • Medicare Annual Wellness Visit (AWV)  05/13/2023   • Urinary Incontinence Screening  05/13/2023   • Kidney Health Evaluation: GFR  10/25/2023   • BMI: Followup Plan  11/15/2023   • Depression Screening  03/06/2024   • BMI: Adult  03/06/2024   • Fall Risk  03/07/2024   • Colorectal Cancer Screening  07/19/2024   • Hepatitis C Screening  Completed   • Osteoporosis Screening  Completed   • Pneumococcal Vaccine: 65+ Years  Completed   • Influenza Vaccine  Completed   • HIB Vaccine  Aged Out   • IPV Vaccine  Aged Out   • Hepatitis A Vaccine  Aged Out   • Meningococcal ACWY Vaccine  Aged Out   • HPV Vaccine  Aged Out     Immunization History   Administered Date(s) Administered   • COVID-19 MODERNA VACC 0 5 ML IM 02/06/2021, 03/09/2021, 01/03/2022   • INFLUENZA 10/24/2007, 10/23/2017, 11/14/2022   • Influenza Split High Dose Preservative Free IM 10/24/2018   • Influenza, high dose seasonal 0 7 mL 09/26/2019, 09/24/2021, 11/14/2022   • Influenza, seasonal, injectable 10/24/2007   • Pneumococcal Conjugate 13-Valent 10/23/2017   • Pneumococcal Polysaccharide PPV23 10/24/2018   • Tdap 04/30/2019   • Zoster 06/17/2016       Neto Gloria PA-C  3/7/2023 11:09 AM  [facility]

## 2023-03-07 ENCOUNTER — EVALUATION (OUTPATIENT)
Dept: PHYSICAL THERAPY | Facility: MEDICAL CENTER | Age: 78
End: 2023-03-07

## 2023-03-07 DIAGNOSIS — M25.561 ACUTE PAIN OF RIGHT KNEE: Primary | ICD-10-CM

## 2023-03-07 NOTE — ASSESSMENT & PLAN NOTE
Reviewed right knee x-ray showed "mild tricompartmental degenerative changes and Ermelinda-Stieda calcifications consistent with MCL injury " Reviewed that her knee exam is unremarkable for any significant abnormalities  She was referred to PT  Consider MRI if not improving

## 2023-03-07 NOTE — PROGRESS NOTES
PT Evaluation     Today's date: 3/7/2023  Patient name: Enrique Pabon  : 1945  MRN: [id number]  Referring provider: Dash Guerrero PA-C  Dx:   Encounter Diagnosis   Name Primary? • Acute pain of right knee                   Assessment  Assessment details: Enrique Pabon is a 69 y o  y/o female who presents with complaints of acute right knee pain  The patient’s greatest concern is not being able to walk without knee pain  Primary movement impairment diagnosis of R tibiofemoral hypomobility, resulting in pathoanatomical symptoms of acute pain of right knee, which limits her ability to perform functional activities without pain  Pt  will benefit from skilled PT services that includes manual therapy techniques to enhance tissue extensibility, neuromuscular re-education to facilitate motor control, therapeutic exercise to increase functional mobility, and modalities prn to reduce pain and inflammation    Impairments: abnormal gait, abnormal muscle firing, abnormal or restricted ROM, activity intolerance, impaired physical strength, lacks appropriate home exercise program and pain with function  Understanding of Dx/Px/POC: good   Prognosis: good    Goals  Impairment Goals  - Pt I with initial HEP in 1-2 visits  - Improve ROM equal to contralateral side in 4-6 weeks  - Increase strength to 5/5 in all affected areas in 4-6 weeks    Functional Goals  - Increase Functional Status Measure to:  60 in 6  weeks  - Patient will be independent with comprehensive HEP in 6 weeks  - Ambulation is improved to prior level of function in 6 weeks  - Stair climbing is improved to prior level of function in 6 weeks  - Squatting is improved to prior level of function in 6 weeks  - Patient will report return to PLOF in 6 weeks    Plan  Patient would benefit from: PT eval  Planned modality interventions: thermotherapy: hydrocollator packs  Planned therapy interventions: manual therapy, joint mobilization, neuromuscular re-education, patient education, strengthening, stretching, therapeutic activities, therapeutic exercise, home exercise program, graded exercise, flexibility, body mechanics training and abdominal trunk stabilization  Frequency: 2x week  Duration in weeks: 6  Treatment plan discussed with: patient      Subjective Evaluation    History of Present Illness  Mechanism of injury: Patient states that she started experiencing right knee pain early January  She does not recall a specific MARGRET, or twisting incident that could have caused the pain  She denies R LE paresthesias  Patient has a hx of LBP and sciatica, which improved with physical therapy in the past   Patient would like to be able to perform activities of daily living without pain  Pain  Current pain ratin  At best pain ratin  At worst pain rating: 10  Location: Medial and lateral R knee  Quality: tight      Diagnostic Tests  Abnormal x-ray: Degenerative changes as described  Treatments  Current treatment: physical therapy  Patient Goals  Patient goals for therapy: decreased pain, increased motion and independence with ADLs/IADLs        Objective     Observations     Right Knee   Negative for deformity and trophic changes  Additional Observation Details  Mild R LE edema  No erythema, or ecchymosis  1+ DP/PT pulses bilaterally  Tenderness     Right Knee   Tenderness in the ITB, lateral joint line, LCL (distal), LCL (proximal), MCL (distal), MCL (proximal) and medial joint line       Neurological Testing     Sensation     Knee   Left Knee   Intact: light touch    Right Knee   Intact: light touch     Reflexes   Left   Patellar (L4): normal (2+)  Achilles (S1): normal (2+)    Right   Patellar (L4): normal (2+)  Achilles (S1): normal (2+)    Active Range of Motion   Left Knee   Flexion: 120 degrees   Extension: 0 degrees   Extensor la degrees     Right Knee   Flexion: 70 degrees   Extension: 5 degrees   Extensor la degrees     Passive Range of Motion   Left Knee   Flexion: 120 degrees   Extension: 0 degrees     Right Knee   Flexion: 90 degrees   Extension: 5 degrees     Additional Passive Range of Motion Details  Hip PROM WNL bilaterally  Mobility   Patellar Mobility:   Left Knee   WFL: medial, lateral, superior and inferior  Right Knee   Hypomobile: medial, lateral, superior and inferior   Tibiofemoral Mobility:   Left knee   Tibiofemoral tendons within functional limits include the anterior and posterior  Right knee Tibiofemoral tendons within functional limits include the anterior and posterior  Patellar Static Positioning   Left Knee: WFL  Right Knee: [facility]    Strength/Myotome Testing     Left Knee   Flexion: 5  Extension: 5  Quadriceps contraction: good    Right Knee   Flexion: 3-  Extension: 3-  Quadriceps contraction: fair    Additional Strength Details  Hip strength deferred d/t pain  Tests     Right Knee   Negative anterior drawer, patellar compression, patella-femoral grind, posterior drawer, valgus stress test at 0 degrees, valgus stress test at 30 degrees, varus stress test at 0 degrees and varus stress test at 30 degrees  Additional Tests Details  Unable to perform medial/lateral Lenard test d/t decreased ROM  Pain c/ OP in extension           Precautions: HTN; DM; Osteoporosis    Manuals 3/7            Patellar mobs AZ            Tib-fem mobs AZ            PROM AZ                         Neuro Re-Ed                                       Ther Ex             Heel slides 20x            Quad sets 20x5s                         Seated hamstring str 3x10s                                                                Ther Activity                                       Gait Training                                       Modalities              Alyson Gonzales, JONATHAN  3/7/2023,10:13 AM

## 2023-03-07 NOTE — PROGRESS NOTES
Diabetic Foot Exam    Patient's shoes and socks removed  Right Foot/Ankle   Right Foot Inspection  Skin Exam: skin normal and skin intact  No dry skin, no warmth, no callus, no erythema, no maceration, no abnormal color, no pre-ulcer, no ulcer and no callus  Sensory   Monofilament testing: intact    Vascular  The right DP pulse is 2+  The right PT pulse is 2+  Left Foot/Ankle  Left Foot Inspection  Skin Exam: skin normal and skin intact  No dry skin, no warmth, no erythema, no maceration, normal color, no pre-ulcer, no ulcer and no callus  Sensory   Monofilament testing: intact    Vascular  The left DP pulse is 2+  The left PT pulse is 2+  Assign Risk Category  No deformity present  No loss of protective sensation  No weak pulses  Risk: 0         FAMILY PRACTICE OFFICE VISIT  [facility] PRIMARY CARE       NAME: Joanne Clemons  AGE: 69 y o  SEX: female       : 1945        MRN: [id number]    DATE: 3/7/2023  TIME: 11:11 AM    Assessment and Plan     Problem List Items Addressed This Visit        Other    Acute pain of right knee - Primary     Reviewed right knee x-ray showed "mild tricompartmental degenerative changes and Ermelinda-Stieda calcifications consistent with MCL injury " Reviewed that her knee exam is unremarkable for any significant abnormalities  She was referred to PT  Consider MRI if not improving  Relevant Orders    Ambulatory Referral to Physical Therapy             Chief Complaint     Chief Complaint   Patient presents with   • Leg Pain     Right knee and leg pain       History of Present Illness   Joanne Clemons is a 69y o -year-old female who presents for right knee pain  Patient notes that she has trouble with right knee pain  She notes that the pain is worse than before   She has not been doing therapy and reports that she was under the impression that the xray was normal  She was told to get a brace but that makes her ankle swollen and numb  She denies redness but does have swelling of the right knee and goes into the leg  Leg Pain           Review of Systems   Review of Systems   Cardiovascular: Positive for leg swelling  Musculoskeletal: Positive for arthralgias and joint swelling  Skin: Negative for color change and rash  Active Problem List     Patient Active Problem List   Diagnosis   • Dietary calcium deficiency   • Obesity (BMI 30 0-34  9)   • Osteoporosis   • Hx of colonic polyps   • Heart murmur   • Type 2 diabetes mellitus without complication, without long-term current use of insulin (HCC)   • Thrombocytopenia (HCC)   • Essential hypertension   • Hypokalemia   • Low hematocrit   • Leg swelling   • Primary generalized (osteo)arthritis   • Lumbar spondylosis   • Acute left-sided low back pain without sciatica   • Acute pain of right knee         Past Medical History:  Past Medical History:   Diagnosis Date   • Chicken pox    • Diabetes mellitus ([facility] [address] )    • Fall 05/01/2019   • Hypertension    • Kidney stones    • Malignant neoplasm of skin     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   • Osteoarthritis    • Osteoporosis        Past Surgical History:  Past Surgical History:   Procedure Laterality Date   • COLONOSCOPY  2018   • COLONOSCOPY  2015    COMPLETE; DUE 3 YRS   • COLONOSCOPY  06/20/2018    polyp x1   • DILATION AND CURETTAGE OF UTERUS  08/2013    THICKENED ENDO ON USG, CVX STENOSIS, PATH: BENIGN POLYP FRAGMENTS AND INACTIVE ENDOMETRIUM   NO NEOPLASIA ; IMPRESSION: 10/27/14; Lili Briggs   • HYSTEROSCOPY     • MOHS SURGERY  02/19/2020    BCC on face   • TONSILLECTOMY         Family History:  Family History   Problem Relation Age of Onset   • Kidney cancer Mother 79        RENAL CANCER   • Hypertension Mother    • Skin cancer Mother    • Osteoporosis Mother    • Osteoporotic fracture Mother         hip - age 80    • Osteoporotic fracture Paternal Grandmother        Social History:  Social History Socioeconomic History   • Marital status: Single     Spouse name: Not on file   • Number of children: Not on file   • Years of education: Not on file   • Highest education level: Not on file   Occupational History   • Occupation: [employer]   Tobacco Use   • Smoking status: Former     Packs/day: 0 50     Years: 5 00     Pack years: 2 50     Types: Cigarettes     Quit date: 1970     Years since quittin 2   • Smokeless tobacco: Never   • Tobacco comments:     STARTED AT AGE 17  STOPPED AT AGE 26     Vaping Use   • Vaping Use: Never used   Substance and Sexual Activity   • Alcohol use: Yes     Comment: Wine for Special Occassion   • Drug use: Never   • Sexual activity: Not on file   Other Topics Concern   • Not on file   Social History Narrative    EXERCISES MODERATELY LESS THAN 3 TIMES WEEK     Social Determinants of Health     Financial Resource Strain: Not on file   Food Insecurity: Not on file   Transportation Needs: Not on file   Physical Activity: Not on file   Stress: Not on file   Social Connections: Not on file   Intimate Partner Violence: Not on file   Housing Stability: Not on file       Objective     Vitals:    23 1400   BP: 120/68   BP Location: Left arm   Cuff Size: Large   Pulse: 75   Resp: 16   Temp: 98 2 °F (36 8 °C)   TempSrc: Tympanic   SpO2: 95%   Weight: 71 3 kg (157 lb 3 2 oz)   Height: 4' 11 5" (1 511 m)     Wt Readings from Last 3 Encounters:   23 71 3 kg (157 lb 3 2 oz)   23 73 kg (161 lb)   11/15/22 69 9 kg (154 lb)       Physical Exam  Vitals reviewed  Constitutional:       General: She is not in acute distress  Appearance: Normal appearance  She is obese  She is not ill-appearing  Cardiovascular:      Rate and Rhythm: Normal rate and regular rhythm  Pulses: Normal pulses  no weak pulses          Dorsalis pedis pulses are 2+ on the right side and 2+ on the left side          Posterior tibial pulses are 2+ on the right side and 2+ on the left side       Heart sounds: Normal heart sounds  No murmur heard  Musculoskeletal:         General: Swelling (of the right knee, especially superior and mesial to the patella) and tenderness (lateral and medial right knee) present  Right lower leg: No edema  Left lower leg: No edema  Comments: No erythema noted of either knee, negative Lenard's bilaterally, negative anterior and posterior drawer bilaterally, negative medial and lateral distraction bilaterally (right knee just tender on thigh where hand is located during testing on the right), negative Apley compression and distraction bilaterally     Feet:      Right foot:      Skin integrity: No ulcer, skin breakdown, erythema, warmth, callus or dry skin  Left foot:      Skin integrity: No ulcer, skin breakdown, erythema, warmth, callus or dry skin  Skin:     General: Skin is warm and dry  Findings: No bruising or erythema  Neurological:      Mental Status: She is alert  Psychiatric:         Mood and Affect: Mood normal          Behavior: Behavior normal          Thought Content:  Thought content normal          Judgment: Judgment normal          Pertinent Laboratory/Diagnostic Studies:  Lab Results   Component Value Date    BUN 13 10/25/2022    CREATININE 0 79 10/25/2022    CALCIUM 10 1 10/25/2022    K 3 7 10/25/2022    CO2 26 10/25/2022     10/25/2022     Lab Results   Component Value Date    ALT 22 10/25/2022    AST 40 (H) 10/25/2022    ALKPHOS 81 10/25/2022       Lab Results   Component Value Date    WBC 4 0 10/25/2022    HGB 12 1 10/25/2022    HCT 33 6 (L) 10/25/2022     2 (H) 10/25/2022     (L) 10/25/2022     Lab Results   Component Value Date    TRIG 69 04/22/2022     Lab Results   Component Value Date    HDL 60 04/22/2022     Lab Results   Component Value Date    LDLCALC 82 04/22/2022     Lab Results   Component Value Date    HGBA1C 6 5 (H) 10/25/2022       Results for orders placed or performed in visit on 10/25/22   Comprehensive metabolic panel   Result Value Ref Range    Glucose, Random 120 (H) 65 - 99 mg/dL    BUN 13 7 - 25 mg/dL    Creatinine 0 79 0 60 - 1 00 mg/dL    eGFR 77 > OR = 60 mL/min/1 73m2    SL AMB BUN/CREATININE RATIO NOT APPLICABLE 6 - 22 (calc)    Sodium 136 135 - 146 mmol/L    Potassium 3 7 3 5 - 5 3 mmol/L    Chloride 103 98 - 110 mmol/L    CO2 26 20 - 32 mmol/L    Calcium 10 1 8 6 - 10 4 mg/dL    Protein, Total 6 4 6 1 - 8 1 g/dL    Albumin 3 3 (L) 3 6 - 5 1 g/dL    Globulin 3 1 1 9 - 3 7 g/dL (calc)    Albumin/Globulin Ratio 1 1 1 0 - 2 5 (calc)    TOTAL BILIRUBIN 1 9 (H) 0 2 - 1 2 mg/dL    Alkaline Phosphatase 81 37 - 153 U/L    AST 40 (H) 10 - 35 U/L    ALT 22 6 - 29 U/L   CBC and differential   Result Value Ref Range    White Blood Cell Count 4 0 3 8 - 10 8 Thousand/uL    Red Blood Cell Count 3 32 (L) 3 80 - 5 10 Million/uL    Hemoglobin 12 1 11 7 - 15 5 g/dL    HCT 33 6 (L) 35 0 - 45 0 %     2 (H) 80 0 - 100 0 fL    MCH 36 4 (H) 27 0 - 33 0 pg    MCHC 36 0 32 0 - 36 0 g/dL    RDW 12 4 11 0 - 15 0 %    Platelet Count 775 (L) 140 - 400 Thousand/uL    SL AMB MPV 10 7 7 5 - 12 5 fL    Neutrophils (Absolute) 2,224 1,500 - 7,800 cells/uL    Lymphocytes (Absolute) 1,308 850 - 3,900 cells/uL    Monocytes (Absolute) 284 200 - 950 cells/uL    Eosinophils (Absolute) 152 15 - 500 cells/uL    Basophils ABS 32 0 - 200 cells/uL    Neutrophils 55 6 %    Lymphocytes 32 7 %    Monocytes 7 1 %    Eosinophils 3 8 %    Basophils PCT 0 8 %   Hemoglobin A1c (w/out EAG)   Result Value Ref Range    Hemoglobin A1C 6 5 (H) <5 7 % of total Hgb         ALLERGIES:  No Known Allergies    Current Medications     Current Outpatient Medications   Medication Sig Dispense Refill   • Calcium Carbonate-Vitamin D 600-200 MG-UNIT TABS Take 1 tablet by mouth 2 (two) times a day     • Cholecalciferol (VITAMIN D3) 1000 units CAPS Take by mouth daily       • denosumab (PROLIA) 60 mg/mL Inject under the skin every 6 (six) months • hydrochlorothiazide (HYDRODIURIL) 25 mg tablet Take 1 tablet (25 mg total) by mouth daily 30 tablet 5   • losartan (COZAAR) 50 mg tablet Take 1 tablet (50 mg total) by mouth daily 30 tablet 5   • Multiple Vitamins-Minerals (DAILY MULTI PO) Take by mouth daily       • potassium chloride (K-DUR,KLOR-CON) 10 mEq tablet Take 1 tablet (10 mEq total) by mouth daily 30 tablet 5   • predniSONE 10 mg tablet Take 4 pills on day 1 and 2, 3 pills on days 3, 4 and 5, 2 pills on day 6 and 7 then stop   21 tablet 0     Current Facility-Administered Medications   Medication Dose Route Frequency Provider Last Rate Last Admin   • denosumab (PROLIA) subcutaneous injection 60 mg  60 mg Subcutaneous Q6 Months Elvin Shabazz MD   60 mg at 11/10/22 [address] Maintenance   Topic Date Due   • COVID-19 Vaccine (4 - Booster for Moderna series) 02/28/2022   • PT PLAN OF CARE  07/30/2022   • DM Eye Exam  01/04/2023   • Diabetic Foot Exam  02/03/2023   • Breast Cancer Screening: Mammogram  04/21/2023   • Kidney Health Evaluation: Microalbumin/Creatinine Ratio  04/22/2023   • HEMOGLOBIN A1C  04/25/2023   • Medicare Annual Wellness Visit (AWV)  05/13/2023   • Urinary Incontinence Screening  05/13/2023   • Kidney Health Evaluation: GFR  10/25/2023   • BMI: Followup Plan  11/15/2023   • Depression Screening  03/06/2024   • BMI: Adult  03/06/2024   • Fall Risk  03/07/2024   • Colorectal Cancer Screening  07/19/2024   • Hepatitis C Screening  Completed   • Osteoporosis Screening  Completed   • Pneumococcal Vaccine: 65+ Years  Completed   • Influenza Vaccine  Completed   • HIB Vaccine  Aged Out   • IPV Vaccine  Aged Out   • Hepatitis A Vaccine  Aged Out   • Meningococcal ACWY Vaccine  Aged Out   • HPV Vaccine  Aged Out     Immunization History   Administered Date(s) Administered   • COVID-19 MODERNA VACC 0 5 ML IM 02/06/2021, 03/09/2021, 01/03/2022   • INFLUENZA 10/24/2007, 10/23/2017, 11/14/2022   • Influenza Split High Dose Preservative Free IM 10/24/2018   • Influenza, high dose seasonal 0 7 mL 09/26/2019, 09/24/2021, 11/14/2022   • Influenza, seasonal, injectable 10/24/2007   • Pneumococcal Conjugate 13-Valent 10/23/2017   • Pneumococcal Polysaccharide PPV23 10/24/2018   • Tdap 04/30/2019   • Zoster 06/17/2016       Jeny Basurto PA-C  3/7/2023 11:11 AM  [facility]

## 2023-03-14 ENCOUNTER — OFFICE VISIT (OUTPATIENT)
Dept: PHYSICAL THERAPY | Facility: MEDICAL CENTER | Age: 78
End: 2023-03-14

## 2023-03-14 DIAGNOSIS — M25.561 ACUTE PAIN OF RIGHT KNEE: Primary | ICD-10-CM

## 2023-03-14 NOTE — PROGRESS NOTES
Daily Note     Today's date: 3/14/2023  Patient name: Cherelle Winslow  : 1945  MRN: [id number]  Referring provider: Eduardo Merchant PA-C  Dx:   Encounter Diagnosis     ICD-10-CM    1  Acute pain of right knee  M25 561                      Subjective:  Patient states that she is doing okay  Objective: See treatment diary below  Assessment:  Patient demonstrates the ability to ambulate independently without knee pain  She noted some difficulty c/ performing heel slides at home d/t back pain  Instructed pt to incline the surface to decrease pressure on her back  Patient tolerated this well today  She demonstrates increased knee ROM without pain  Tolerated treatment well  Patient would benefit from continued PT  Plan: Continue per plan of care        Precautions: HTN; DM; Osteoporosis    Manuals 3/7 3/14           Patellar mobs AZ AZ           Tib-fem mobs AZ AZ           PROM AZ AZ                        Neuro Re-Ed                                       Ther Ex             Heel slides 20x 30x           Quad sets 20x5s 3x10 5s                        Seated hamstring str 3x10s 3x10s                        Hip add  20x5s           Hip abd  20x5s                        Ther Activity                                       Gait Training                                       Modalities              10' 10'

## 2023-03-16 ENCOUNTER — OFFICE VISIT (OUTPATIENT)
Dept: PHYSICAL THERAPY | Facility: MEDICAL CENTER | Age: 78
End: 2023-03-16

## 2023-03-16 DIAGNOSIS — M25.561 ACUTE PAIN OF RIGHT KNEE: Primary | ICD-10-CM

## 2023-03-16 NOTE — PROGRESS NOTES
Daily Note     Today's date: 3/16/2023  Patient name: Yannick Jorge  : 1945  MRN: [id number]  Referring provider: Edwardo Herrera PA-C  Dx:   Encounter Diagnosis     ICD-10-CM    1  Acute pain of right knee  M25 561                      Subjective:  Patient states that she is doing better  Objective: See treatment diary below  Assessment:  Patient demonstrates increased R knee ROM  She is doing well c/ progressions  Tolerated treatment well  Patient would benefit from continued PT  Plan: Continue per plan of care        Precautions: HTN; DM; Osteoporosis    Manuals 3/7 3/14 3/16          Patellar mobs AZ AZ AZ          Tib-fem mobs AZ AZ AZ          PROM AZ AZ AZ                       Neuro Re-Ed                                       Ther Ex             Bike (ROM)   5'          Heel slides 20x 30x 30x reg 30x strap          Quad sets 20x5s 3x10 5s 3x10 5s          Long sitting gastroc str   3x30s          Seated hamstring str 3x10s 3x10s 3x10s                       Hip add  20x5s 20x5s          Hip abd  20x5s blue 20x5s blue                       Ther Activity                                       Gait Training                                       Modalities              10' 10' 10'

## 2023-03-21 ENCOUNTER — OFFICE VISIT (OUTPATIENT)
Dept: PHYSICAL THERAPY | Facility: MEDICAL CENTER | Age: 78
End: 2023-03-21

## 2023-03-21 DIAGNOSIS — M25.561 ACUTE PAIN OF RIGHT KNEE: Primary | ICD-10-CM

## 2023-03-21 NOTE — PROGRESS NOTES
Daily Note     Today's date: 3/21/2023  Patient name: Pelon Huang  : 1945  MRN: [id number]  Referring provider: Elizabet Santos PA-C  Dx:   Encounter Diagnosis     ICD-10-CM    1  Acute pain of right knee  M25 561                      Subjective:  Patient states that she is doing better  Objective: See treatment diary below  Assessment:  Patient demonstrates good tolerance to exercise progressions c/ increased R knee ROM  Tolerated treatment well  Patient would benefit from continued PT  Plan: Continue per plan of care        Precautions: HTN; DM; Osteoporosis    Manuals 3/7 3/14 3/16 3/21         Patellar mobs AZ AZ AZ AZ         Tib-fem mobs AZ AZ AZ AZ         PROM AZ AZ AZ AZ                      Neuro Re-Ed                                       Ther Ex             Bike (ROM)   5' 5'         Heel slides 20x 30x 30x reg 30x strap 30x strap         Quad sets 20x5s 3x10 5s 3x10 5s 3x10 5s         Long sitting gastroc str   3x30s 3x30s         Seated hamstring str 3x10s 3x10s 3x10s 3x10s                      Hip add  20x5s 20x5s 20x5s         Hip abd  20x5s blue 20x5s blue 30x5s blue                      Ther Activity                                       Gait Training                                       Modalities              10' 10' 10' 10'

## 2023-03-23 ENCOUNTER — OFFICE VISIT (OUTPATIENT)
Dept: PHYSICAL THERAPY | Facility: MEDICAL CENTER | Age: 78
End: 2023-03-23

## 2023-03-23 DIAGNOSIS — M25.561 ACUTE PAIN OF RIGHT KNEE: Primary | ICD-10-CM

## 2023-03-23 NOTE — PROGRESS NOTES
Daily Note     Today's date: 3/23/2023  Patient name: Nasim Davis  : 1945  MRN: [id number]  Referring provider: Tyra Jones PA-C  Dx:   Encounter Diagnosis     ICD-10-CM    1  Acute pain of right knee  M25 561                      Subjective:  Patient states that she is doing well  Objective: See treatment diary below  Assessment:  Patient demonstrates increased R knee ROM and good tolerance to progressions  Tolerated treatment well  Patient would benefit from continued PT  Plan: Continue per plan of care        Precautions: HTN; DM; Osteoporosis    Manuals 3/7 3/14 3/16 3/21 3/23        Patellar mobs AZ AZ AZ AZ AZ        Tib-fem mobs AZ AZ AZ AZ AZ        PROM AZ AZ AZ AZ AZ                     Neuro Re-Ed                                       Ther Ex             Bike (ROM)   5' 5' 5'        Heel slides 20x 30x 30x reg 30x strap 30x strap 30x strap        Quad sets 20x5s 3x10 5s 3x10 5s 3x10 5s 3x10 5s        Long sitting gastroc str   3x30s 3x30s 3x30s        Seated hamstring str 3x10s 3x10s 3x10s 3x10s 3x10s        Standing hamstring curls     3x10        Hip add  20x5s 20x5s 20x5s 30x5s        Hip abd  20x5s blue 20x5s blue 30x5s blue 30x5s blue                     Ther Activity                                       Gait Training                                       Modalities              10' 10' 10' 10' 10'

## 2023-03-28 ENCOUNTER — OFFICE VISIT (OUTPATIENT)
Dept: PHYSICAL THERAPY | Facility: MEDICAL CENTER | Age: 78
End: 2023-03-28

## 2023-03-28 DIAGNOSIS — M25.561 ACUTE PAIN OF RIGHT KNEE: Primary | ICD-10-CM

## 2023-03-28 NOTE — PROGRESS NOTES
Daily Note     Today's date: 3/28/2023  Patient name: Elicia Ellis  : 1945  MRN: [id number]  Referring provider: Tasha Brown PA-C  Dx:   Encounter Diagnosis     ICD-10-CM    1  Acute pain of right knee  M25 561                      Subjective:  Patient states that she is doing well  Objective: See treatment diary below  Assessment:  Patient demonstrates increased R knee ROM  She demonstrates moderate lateral knee soreness today after picking up a box at home that was delivered this morning  Tolerated treatment well  Patient would benefit from continued PT  Plan: Continue per plan of care        Precautions: HTN; DM; Osteoporosis    Manuals 3/7 3/14 3/16 3/21 3/23 3/28       Patellar mobs AZ AZ AZ AZ AZ AZ       Tib-fem mobs AZ AZ AZ AZ AZ AZ       PROM AZ AZ AZ AZ AZ AZ                    Neuro Re-Ed                                       Ther Ex             Bike (ROM)   5' 5' 5' 5'       Heel slides 20x 30x 30x reg 30x strap 30x strap 30x strap 30x strap       Quad sets 20x5s 3x10 5s 3x10 5s 3x10 5s 3x10 5s 3x10 5s       Long sitting gastroc str   3x30s 3x30s 3x30s 3x30s       Seated hamstring str 3x10s 3x10s 3x10s 3x10s 3x10s 3x10s       Standing hamstring curls     3x10 3x10       Hip add  20x5s 20x5s 20x5s 30x5s 30x5s       Hip abd  20x5s blue 20x5s blue 30x5s blue 30x5s blue 30x5s blue                    Ther Activity                                       Gait Training                                       Modalities              10' 10' 10' 10' 10' 10'

## 2023-03-30 ENCOUNTER — OFFICE VISIT (OUTPATIENT)
Dept: PHYSICAL THERAPY | Facility: MEDICAL CENTER | Age: 78
End: 2023-03-30

## 2023-03-30 DIAGNOSIS — M25.561 ACUTE PAIN OF RIGHT KNEE: Primary | ICD-10-CM

## 2023-03-30 NOTE — PROGRESS NOTES
Daily Note     Today's date: 3/30/2023  Patient name: Alice Hein  : 1945  MRN: [id number]  Referring provider: Syeda Judge PA-C  Dx:   Encounter Diagnosis     ICD-10-CM    1  Acute pain of right knee  M25 561                      Subjective:  Patient states that she is doing better  Objective: See treatment diary below  Assessment:  Patient demonstrates good tolerance to progressions c/ increased ability to ambulate without pain  Tolerated treatment well  Patient would benefit from continued PT  Plan: Continue per plan of care        Precautions: HTN; DM; Osteoporosis    Manuals 3/7 3/14 3/16 3/21 3/23 3/28 3/30      Patellar mobs AZ AZ AZ AZ AZ AZ AZ      Tib-fem mobs AZ AZ AZ AZ AZ AZ AZ      PROM AZ AZ AZ AZ AZ AZ AZ                   Neuro Re-Ed                                       Ther Ex             Bike (ROM)   5' 5' 5' 5' 10'      Heel slides 20x 30x 30x reg 30x strap 30x strap 30x strap 30x strap 30x strap      Quad sets 20x5s 3x10 5s 3x10 5s 3x10 5s 3x10 5s 3x10 5s 3x10 5s      Long sitting gastroc str   3x30s 3x30s 3x30s 3x30s 3x30s      Seated hamstring str 3x10s 3x10s 3x10s 3x10s 3x10s 3x10s 3x10s      Standing hamstring curls     3x10 3x10 3x12      Hip add  20x5s 20x5s 20x5s 30x5s 30x5s 30x5s      Hip abd  20x5s blue 20x5s blue 30x5s blue 30x5s blue 30x5s blue 30x5s blue                   Ther Activity                                       Gait Training                                       Modalities              10' 10' 10' 10' 10' 10' 10'

## 2023-04-04 ENCOUNTER — OFFICE VISIT (OUTPATIENT)
Dept: PHYSICAL THERAPY | Facility: MEDICAL CENTER | Age: 78
End: 2023-04-04

## 2023-04-04 DIAGNOSIS — M25.561 ACUTE PAIN OF RIGHT KNEE: Primary | ICD-10-CM

## 2023-04-04 NOTE — PROGRESS NOTES
Daily Note     Today's date: 2023  Patient name: Bisi Ocampo  : 1945  MRN: [id number]  Referring provider: Dodie Besaley PA-C  Dx:   Encounter Diagnosis     ICD-10-CM    1  Acute pain of right knee  M25 561                      Subjective:  Patient states that she is doing well  Objective: See treatment diary below  Assessment: Tolerated treatment well  Patient would benefit from continued PT    Plan: Continue per plan of care        Precautions: HTN; DM; Osteoporosis    Manuals 3/7 3/14 3/16 3/21 3/23 3/28 3/30 4/4     Patellar mobs AZ AZ AZ AZ AZ AZ AZ AZ     Tib-fem mobs AZ AZ AZ AZ AZ AZ AZ AZ     PROM AZ AZ AZ AZ AZ AZ AZ AZ                  Neuro Re-Ed                                       Ther Ex             Bike (ROM)   5' 5' 5' 5' 10' 10'     Heel slides 20x 30x 30x reg 30x strap 30x strap 30x strap 30x strap 30x strap 30x strap     Quad sets 20x5s 3x10 5s 3x10 5s 3x10 5s 3x10 5s 3x10 5s 3x10 5s 3x10 5s     Long sitting gastroc str   3x30s 3x30s 3x30s 3x30s 3x30s 3x30s     Seated hamstring str 3x10s 3x10s 3x10s 3x10s 3x10s 3x10s 3x10s 3x10s     Standing hamstring curls     3x10 3x10 3x12 3x15     Hip add  20x5s 20x5s 20x5s 30x5s 30x5s 30x5s 30x5s     Hip abd  20x5s blue 20x5s blue 30x5s blue 30x5s blue 30x5s blue 30x5s blue 30x5s black                  Ther Activity                                       Gait Training                                       Modalities              10' 10' 10' 10' 10' 10' 10' 10'

## 2023-04-06 ENCOUNTER — OFFICE VISIT (OUTPATIENT)
Dept: PHYSICAL THERAPY | Facility: MEDICAL CENTER | Age: 78
End: 2023-04-06

## 2023-04-06 DIAGNOSIS — M25.561 ACUTE PAIN OF RIGHT KNEE: Primary | ICD-10-CM

## 2023-04-06 NOTE — PROGRESS NOTES
Daily Note     Today's date: 2023  Patient name: Santiago Lord  : 1945  MRN: [id number]  Referring provider: Jose Mary PA-C  Dx:   Encounter Diagnosis     ICD-10-CM    1  Acute pain of right knee  M25 561                      Subjective:  Patient states that she is getting close to 100% better  Objective: See treatment diary below  Assessment:  Patient demonstrates good tolerance to exercise progressions c/ increased knee ROM and appropriate progression towards all goals  Tolerated treatment well  Patient would benefit from continued PT  Plan: Continue per plan of care        Precautions: HTN; DM; Osteoporosis    Manuals 3/7 3/14 3/16 3/21 3/23 3/28 3/30 4/4 4/6    Patellar mobs AZ AZ AZ AZ AZ AZ AZ AZ AZ    Tib-fem mobs AZ AZ AZ AZ AZ AZ AZ AZ AZ    PROM AZ AZ AZ AZ AZ AZ AZ AZ AZ                 Neuro Re-Ed                                       Ther Ex             Bike (ROM)   5' 5' 5' 5' 10' 10' 10'    Heel slides 20x 30x 30x reg 30x strap 30x strap 30x strap 30x strap 30x strap 30x strap 30x strap    Quad sets 20x5s 3x10 5s 3x10 5s 3x10 5s 3x10 5s 3x10 5s 3x10 5s 3x10 5s 3x10 5s    Long sitting gastroc str   3x30s 3x30s 3x30s 3x30s 3x30s 3x30s 3x30s    Seated hamstring str 3x10s 3x10s 3x10s 3x10s 3x10s 3x10s 3x10s 3x10s 3x10s    Standing hamstring curls     3x10 3x10 3x12 3x15 3x10 1#    Hip add  20x5s 20x5s 20x5s 30x5s 30x5s 30x5s 30x5s 30x5s    Hip abd  20x5s blue 20x5s blue 30x5s blue 30x5s blue 30x5s blue 30x5s blue [id number]                 Ther Activity                                       Gait Training                                       Modalities              10' 10' 10' 10' 10' 10' 10' 10' 10'

## 2023-04-26 LAB
25(OH)D3 SERPL-MCNC: 76 NG/ML (ref 30–100)
ALBUMIN SERPL-MCNC: 3.2 G/DL (ref 3.6–5.1)
ALBUMIN/GLOB SERPL: 1 (CALC) (ref 1–2.5)
ALP SERPL-CCNC: 78 U/L (ref 37–153)
ALT SERPL-CCNC: 18 U/L (ref 6–29)
AST SERPL-CCNC: 34 U/L (ref 10–35)
BASOPHILS # BLD AUTO: 28 CELLS/UL (ref 0–200)
BASOPHILS NFR BLD AUTO: 0.6 %
BILIRUB SERPL-MCNC: 1.8 MG/DL (ref 0.2–1.2)
BUN SERPL-MCNC: 12 MG/DL (ref 7–25)
BUN/CREAT SERPL: ABNORMAL (CALC) (ref 6–22)
CALCIUM SERPL-MCNC: 9.9 MG/DL (ref 8.6–10.4)
CHLORIDE SERPL-SCNC: 102 MMOL/L (ref 98–110)
CO2 SERPL-SCNC: 26 MMOL/L (ref 20–32)
CREAT SERPL-MCNC: 0.78 MG/DL (ref 0.6–1)
EOSINOPHIL # BLD AUTO: 156 CELLS/UL (ref 15–500)
EOSINOPHIL NFR BLD AUTO: 3.4 %
ERYTHROCYTE [DISTWIDTH] IN BLOOD BY AUTOMATED COUNT: 12.9 % (ref 11–15)
GFR/BSA.PRED SERPLBLD CYS-BASED-ARV: 78 ML/MIN/1.73M2
GLOBULIN SER CALC-MCNC: 3.1 G/DL (CALC) (ref 1.9–3.7)
GLUCOSE SERPL-MCNC: 121 MG/DL (ref 65–99)
HCT VFR BLD AUTO: 35.6 % (ref 35–45)
HGB BLD-MCNC: 12.6 G/DL (ref 11.7–15.5)
LYMPHOCYTES # BLD AUTO: 1201 CELLS/UL (ref 850–3900)
LYMPHOCYTES NFR BLD AUTO: 26.1 %
MCH RBC QN AUTO: 35.5 PG (ref 27–33)
MCHC RBC AUTO-ENTMCNC: 35.4 G/DL (ref 32–36)
MCV RBC AUTO: 100.3 FL (ref 80–100)
MONOCYTES # BLD AUTO: 317 CELLS/UL (ref 200–950)
MONOCYTES NFR BLD AUTO: 6.9 %
NEUTROPHILS # BLD AUTO: 2898 CELLS/UL (ref 1500–7800)
NEUTROPHILS NFR BLD AUTO: 63 %
PLATELET # BLD AUTO: 111 THOUSAND/UL (ref 140–400)
PMV BLD REES-ECKER: 10.5 FL (ref 7.5–12.5)
POTASSIUM SERPL-SCNC: 3.5 MMOL/L (ref 3.5–5.3)
PROT SERPL-MCNC: 6.3 G/DL (ref 6.1–8.1)
RBC # BLD AUTO: 3.55 MILLION/UL (ref 3.8–5.1)
SODIUM SERPL-SCNC: 136 MMOL/L (ref 135–146)
WBC # BLD AUTO: 4.6 THOUSAND/UL (ref 3.8–10.8)

## 2023-04-27 LAB
CHOLEST SERPL-MCNC: 166 MG/DL
CHOLEST/HDLC SERPL: 2.6 (CALC)
HBA1C MFR BLD: 6.7 % OF TOTAL HGB
HDLC SERPL-MCNC: 65 MG/DL
LDLC SERPL CALC-MCNC: 84 MG/DL (CALC)
NONHDLC SERPL-MCNC: 101 MG/DL (CALC)
TRIGL SERPL-MCNC: 78 MG/DL
TSH SERPL-ACNC: 1.64 MIU/L (ref 0.4–4.5)

## 2023-05-11 ENCOUNTER — OFFICE VISIT (OUTPATIENT)
Dept: RHEUMATOLOGY | Facility: CLINIC | Age: 78
End: 2023-05-11

## 2023-05-11 DIAGNOSIS — M81.0 AGE-RELATED OSTEOPOROSIS WITHOUT CURRENT PATHOLOGICAL FRACTURE: Primary | ICD-10-CM

## 2023-05-11 NOTE — PROGRESS NOTES
Pt presents for prolia injection today  Administered into L arm  Pt tolerated well  No redness, swelling, or irritation on or around injection site

## 2023-05-15 ENCOUNTER — OFFICE VISIT (OUTPATIENT)
Dept: FAMILY MEDICINE CLINIC | Facility: CLINIC | Age: 78
End: 2023-05-15

## 2023-05-15 VITALS
WEIGHT: 158.6 LBS | SYSTOLIC BLOOD PRESSURE: 110 MMHG | TEMPERATURE: 97 F | DIASTOLIC BLOOD PRESSURE: 60 MMHG | OXYGEN SATURATION: 97 % | HEART RATE: 75 BPM | RESPIRATION RATE: 14 BRPM | BODY MASS INDEX: 31.14 KG/M2 | HEIGHT: 60 IN

## 2023-05-15 DIAGNOSIS — Z00.00 MEDICARE ANNUAL WELLNESS VISIT, SUBSEQUENT: Primary | ICD-10-CM

## 2023-05-15 DIAGNOSIS — R01.1 HEART MURMUR: ICD-10-CM

## 2023-05-15 DIAGNOSIS — E66.9 OBESITY (BMI 30.0-34.9): ICD-10-CM

## 2023-05-15 DIAGNOSIS — E11.9 TYPE 2 DIABETES MELLITUS WITHOUT COMPLICATION, WITHOUT LONG-TERM CURRENT USE OF INSULIN (HCC): ICD-10-CM

## 2023-05-15 DIAGNOSIS — I10 ESSENTIAL HYPERTENSION: ICD-10-CM

## 2023-05-15 DIAGNOSIS — M81.0 AGE-RELATED OSTEOPOROSIS WITHOUT CURRENT PATHOLOGICAL FRACTURE: ICD-10-CM

## 2023-05-15 RX ORDER — LOSARTAN POTASSIUM 50 MG/1
50 TABLET ORAL DAILY
Qty: 30 TABLET | Refills: 5 | Status: SHIPPED | OUTPATIENT
Start: 2023-05-15

## 2023-05-15 RX ORDER — POTASSIUM CHLORIDE 750 MG/1
10 TABLET, EXTENDED RELEASE ORAL DAILY
Qty: 30 TABLET | Refills: 5 | Status: SHIPPED | OUTPATIENT
Start: 2023-05-15

## 2023-05-15 RX ORDER — HYDROCHLOROTHIAZIDE 25 MG/1
25 TABLET ORAL DAILY
Qty: 30 TABLET | Refills: 5 | Status: SHIPPED | OUTPATIENT
Start: 2023-05-15

## 2023-05-15 NOTE — PATIENT INSTRUCTIONS
Medicare Preventive Visit Patient Instructions  Thank you for completing your Welcome to Medicare Visit or Medicare Annual Wellness Visit today  Your next wellness visit will be due in one year (5/15/2024)  The screening/preventive services that you may require over the next 5-10 years are detailed below  Some tests may not apply to you based off risk factors and/or age  Screening tests ordered at today's visit but not completed yet may show as past due  Also, please note that scanned in results may not display below  Preventive Screenings:  Service Recommendations Previous Testing/Comments   Colorectal Cancer Screening  * Colonoscopy    * Fecal Occult Blood Test (FOBT)/Fecal Immunochemical Test (FIT)  * Fecal DNA/Cologuard Test  * Flexible Sigmoidoscopy Age: 39-70 years old   Colonoscopy: every 10 years (may be performed more frequently if at higher risk)  OR  FOBT/FIT: every 1 year  OR  Cologuard: every 3 years  OR  Sigmoidoscopy: every 5 years  Screening may be recommended earlier than age 39 if at higher risk for colorectal cancer  Also, an individualized decision between you and your healthcare provider will decide whether screening between the ages of 74-80 would be appropriate  Colonoscopy: 07/20/2021  FOBT/FIT: Not on file  Cologuard: Not on file  Sigmoidoscopy: Not on file    Screening Current     Breast Cancer Screening Age: 36 years old  Frequency: every 1-2 years  Not required if history of left and right mastectomy Mammogram: 04/21/2022    Screening Current   Cervical Cancer Screening Between the ages of 21-29, pap smear recommended once every 3 years  Between the ages of 33-67, can perform pap smear with HPV co-testing every 5 years     Recommendations may differ for women with a history of total hysterectomy, cervical cancer, or abnormal pap smears in past  Pap Smear: Not on file    Screening Not Indicated   Hepatitis C Screening Once for adults born between 1945 and 1965  More frequently in patients at high risk for Hepatitis C Hep C Antibody: 04/24/2018    Screening Current   Diabetes Screening 1-2 times per year if you're at risk for diabetes or have pre-diabetes Fasting glucose: No results in last 5 years (No results in last 5 years)  A1C: 6 7 % of total Hgb (4/26/2023)  Screening Not Indicated  History Diabetes   Cholesterol Screening Once every 5 years if you don't have a lipid disorder  May order more often based on risk factors  Lipid panel: 04/26/2023    Screening Current     Other Preventive Screenings Covered by Medicare:  1  Abdominal Aortic Aneurysm (AAA) Screening: covered once if your at risk  You're considered to be at risk if you have a family history of AAA  2  Lung Cancer Screening: covers low dose CT scan once per year if you meet all of the following conditions: (1) Age 50-69; (2) No signs or symptoms of lung cancer; (3) Current smoker or have quit smoking within the last 15 years; (4) You have a tobacco smoking history of at least 20 pack years (packs per day multiplied by number of years you smoked); (5) You get a written order from a healthcare provider  3  Glaucoma Screening: covered annually if you're considered high risk: (1) You have diabetes OR (2) Family history of glaucoma OR (3)  aged 48 and older OR (3)  American aged 72 and older  3  Osteoporosis Screening: covered every 2 years if you meet one of the following conditions: (1) You're estrogen deficient and at risk for osteoporosis based off medical history and other findings; (2) Have a vertebral abnormality; (3) On glucocorticoid therapy for more than 3 months; (4) Have primary hyperparathyroidism; (5) On osteoporosis medications and need to assess response to drug therapy  · Last bone density test (DXA Scan): 10/19/2022   5  HIV Screening: covered annually if you're between the age of 15-65   Also covered annually if you are younger than 13 and older than 72 with risk factors for HIV infection  For pregnant patients, it is covered up to 3 times per pregnancy  Immunizations:  Immunization Recommendations   Influenza Vaccine Annual influenza vaccination during flu season is recommended for all persons aged >= 6 months who do not have contraindications   Pneumococcal Vaccine   * Pneumococcal conjugate vaccine = PCV13 (Prevnar 13), PCV15 (Vaxneuvance), PCV20 (Prevnar 20)  * Pneumococcal polysaccharide vaccine = PPSV23 (Pneumovax) Adults 25-60 years old: 1-3 doses may be recommended based on certain risk factors  Adults 72 years old: 1-2 doses may be recommended based off what pneumonia vaccine you previously received   Hepatitis B Vaccine 3 dose series if at intermediate or high risk (ex: diabetes, end stage renal disease, liver disease)   Tetanus (Td) Vaccine - COST NOT COVERED BY MEDICARE PART B Following completion of primary series, a booster dose should be given every 10 years to maintain immunity against tetanus  Td may also be given as tetanus wound prophylaxis  Tdap Vaccine - COST NOT COVERED BY MEDICARE PART B Recommended at least once for all adults  For pregnant patients, recommended with each pregnancy  Shingles Vaccine (Shingrix) - COST NOT COVERED BY MEDICARE PART B  2 shot series recommended in those aged 48 and above     Health Maintenance Due:      Topic Date Due   • Breast Cancer Screening: Mammogram  04/21/2023   • Colorectal Cancer Screening  07/19/2024   • Hepatitis C Screening  Completed     Immunizations Due:  There are no preventive care reminders to display for this patient  Advance Directives   What are advance directives? Advance directives are legal documents that state your wishes and plans for medical care  These plans are made ahead of time in case you lose your ability to make decisions for yourself  Advance directives can apply to any medical decision, such as the treatments you want, and if you want to donate organs     What are the types of advance directives? There are many types of advance directives, and each state has rules about how to use them  You may choose a combination of any of the following:  · Living will: This is a written record of the treatment you want  You can also choose which treatments you do not want, which to limit, and which to stop at a certain time  This includes surgery, medicine, IV fluid, and tube feedings  · Durable power of  for healthcare [facility]): This is a written record that states who you want to make healthcare choices for you when you are unable to make them for yourself  This person, called a proxy, is usually a family member or a friend  You may choose more than 1 proxy  · Do not resuscitate (DNR) order:  A DNR order is used in case your heart stops beating or you stop breathing  It is a request not to have certain forms of treatment, such as CPR  A DNR order may be included in other types of advance directives  · Medical directive: This covers the care that you want if you are in a coma, near death, or unable to make decisions for yourself  You can list the treatments you want for each condition  Treatment may include pain medicine, surgery, blood transfusions, dialysis, IV or tube feedings, and a ventilator (breathing machine)  · Values history: This document has questions about your views, beliefs, and how you feel and think about life  This information can help others choose the care that you would choose  Why are advance directives important? An advance directive helps you control your care  Although spoken wishes may be used, it is better to have your wishes written down  Spoken wishes can be misunderstood, or not followed  Treatments may be given even if you do not want them  An advance directive may make it easier for your family to make difficult choices about your care     Weight Management   Why it is important to manage your weight:  Being overweight increases your risk of health conditions such as heart disease, high blood pressure, type 2 diabetes, and certain types of cancer  It can also increase your risk for osteoarthritis, sleep apnea, and other respiratory problems  Aim for a slow, steady weight loss  Even a small amount of weight loss can lower your risk of health problems  How to lose weight safely:  A safe and healthy way to lose weight is to eat fewer calories and get regular exercise  You can lose up about 1 pound a week by decreasing the number of calories you eat by 500 calories each day  Healthy meal plan for weight management:  A healthy meal plan includes a variety of foods, contains fewer calories, and helps you stay healthy  A healthy meal plan includes the following:  · Eat whole-grain foods more often  A healthy meal plan should contain fiber  Fiber is the part of grains, fruits, and vegetables that is not broken down by your body  Whole-grain foods are healthy and provide extra fiber in your diet  Some examples of whole-grain foods are whole-wheat breads and pastas, oatmeal, brown rice, and bulgur  · Eat a variety of vegetables every day  Include dark, leafy greens such as spinach, kale, radha greens, and mustard greens  Eat yellow and orange vegetables such as carrots, sweet potatoes, and winter squash  · Eat a variety of fruits every day  Choose fresh or canned fruit (canned in its own juice or light syrup) instead of juice  Fruit juice has very little or no fiber  · Eat low-fat dairy foods  Drink fat-free (skim) milk or 1% milk  Eat fat-free yogurt and low-fat cottage cheese  Try low-fat cheeses such as mozzarella and other reduced-fat cheeses  · Choose meat and other protein foods that are low in fat  Choose beans or other legumes such as split peas or lentils  Choose fish, skinless poultry (chicken or turkey), or lean cuts of red meat (beef or pork)  Before you cook meat or poultry, cut off any visible fat  · Use less fat and oil    Try baking foods instead of frying them  Add less fat, such as margarine, sour cream, regular salad dressing and mayonnaise to foods  Eat fewer high-fat foods  Some examples of high-fat foods include french fries, doughnuts, ice cream, and cakes  · Eat fewer sweets  Limit foods and drinks that are high in sugar  This includes candy, cookies, regular soda, and sweetened drinks  Exercise:  Exercise at least 30 minutes per day on most days of the week  Some examples of exercise include walking, biking, dancing, and swimming  You can also fit in more physical activity by taking the stairs instead of the elevator or parking farther away from stores  Ask your healthcare provider about the best exercise plan for you  © Copyright [employer] 2018 Information is for End User's use only and may not be sold, redistributed or otherwise used for commercial purposes  All illustrations and images included in CareNotes® are the copyrighted property of [employer]  or [facility] Preventive Visit Patient Instructions  Thank you for completing your Welcome to Medicare Visit or Medicare Annual Wellness Visit today  Your next wellness visit will be due in one year (5/15/2024)  The screening/preventive services that you may require over the next 5-10 years are detailed below  Some tests may not apply to you based off risk factors and/or age  Screening tests ordered at today's visit but not completed yet may show as past due  Also, please note that scanned in results may not display below    Preventive Screenings:  Service Recommendations Previous Testing/Comments   Colorectal Cancer Screening  * Colonoscopy    * Fecal Occult Blood Test (FOBT)/Fecal Immunochemical Test (FIT)  * Fecal DNA/Cologuard Test  * Flexible Sigmoidoscopy Age: 39-70 years old   Colonoscopy: every 10 years (may be performed more frequently if at higher risk)  OR  FOBT/FIT: every 1 year  OR  Cologuard: every 3 years  OR  Sigmoidoscopy: every 5 years  Screening may be recommended earlier than age 39 if at higher risk for colorectal cancer  Also, an individualized decision between you and your healthcare provider will decide whether screening between the ages of 74-80 would be appropriate  Colonoscopy: 07/20/2021  FOBT/FIT: Not on file  Cologuard: Not on file  Sigmoidoscopy: Not on file    Screening Current     Breast Cancer Screening Age: 36 years old  Frequency: every 1-2 years  Not required if history of left and right mastectomy Mammogram: 04/21/2022    Screening Current   Cervical Cancer Screening Between the ages of 21-29, pap smear recommended once every 3 years  Between the ages of 33-67, can perform pap smear with HPV co-testing every 5 years  Recommendations may differ for women with a history of total hysterectomy, cervical cancer, or abnormal pap smears in past  Pap Smear: Not on file    Screening Not Indicated   Hepatitis C Screening Once for adults born between 1945 and 1965  More frequently in patients at high risk for Hepatitis C Hep C Antibody: 04/24/2018    Screening Current   Diabetes Screening 1-2 times per year if you're at risk for diabetes or have pre-diabetes Fasting glucose: No results in last 5 years (No results in last 5 years)  A1C: 6 7 % of total Hgb (4/26/2023)  Screening Not Indicated  History Diabetes   Cholesterol Screening Once every 5 years if you don't have a lipid disorder  May order more often based on risk factors  Lipid panel: 04/26/2023    Screening Current     Other Preventive Screenings Covered by Medicare:  6  Abdominal Aortic Aneurysm (AAA) Screening: covered once if your at risk  You're considered to be at risk if you have a family history of AAA    7  Lung Cancer Screening: covers low dose CT scan once per year if you meet all of the following conditions: (1) Age 50-69; (2) No signs or symptoms of lung cancer; (3) Current smoker or have quit smoking within the last 15 years; (4) You have a tobacco smoking history of at least 20 pack years (packs per day multiplied by number of years you smoked); (5) You get a written order from a healthcare provider  8  Glaucoma Screening: covered annually if you're considered high risk: (1) You have diabetes OR (2) Family history of glaucoma OR (3)  aged 48 and older OR (3)  American aged 72 and older  5  Osteoporosis Screening: covered every 2 years if you meet one of the following conditions: (1) You're estrogen deficient and at risk for osteoporosis based off medical history and other findings; (2) Have a vertebral abnormality; (3) On glucocorticoid therapy for more than 3 months; (4) Have primary hyperparathyroidism; (5) On osteoporosis medications and need to assess response to drug therapy  · Last bone density test (DXA Scan): 10/19/2022   10  HIV Screening: covered annually if you're between the age of 15-65  Also covered annually if you are younger than 13 and older than 72 with risk factors for HIV infection  For pregnant patients, it is covered up to 3 times per pregnancy  Immunizations:  Immunization Recommendations   Influenza Vaccine Annual influenza vaccination during flu season is recommended for all persons aged >= 6 months who do not have contraindications   Pneumococcal Vaccine   * Pneumococcal conjugate vaccine = PCV13 (Prevnar 13), PCV15 (Vaxneuvance), PCV20 (Prevnar 20)  * Pneumococcal polysaccharide vaccine = PPSV23 (Pneumovax) Adults 25-60 years old: 1-3 doses may be recommended based on certain risk factors  Adults 72 years old: 1-2 doses may be recommended based off what pneumonia vaccine you previously received   Hepatitis B Vaccine 3 dose series if at intermediate or high risk (ex: diabetes, end stage renal disease, liver disease)   Tetanus (Td) Vaccine - COST NOT COVERED BY MEDICARE PART B Following completion of primary series, a booster dose should be given every 10 years to maintain immunity against tetanus   Td may also be given as tetanus wound prophylaxis  Tdap Vaccine - COST NOT COVERED BY MEDICARE PART B Recommended at least once for all adults  For pregnant patients, recommended with each pregnancy  Shingles Vaccine (Shingrix) - COST NOT COVERED BY MEDICARE PART B  2 shot series recommended in those aged 48 and above     Health Maintenance Due:      Topic Date Due   • Breast Cancer Screening: Mammogram  04/21/2023   • Colorectal Cancer Screening  07/19/2024   • Hepatitis C Screening  Completed     Immunizations Due:  There are no preventive care reminders to display for this patient  Advance Directives   What are advance directives? Advance directives are legal documents that state your wishes and plans for medical care  These plans are made ahead of time in case you lose your ability to make decisions for yourself  Advance directives can apply to any medical decision, such as the treatments you want, and if you want to donate organs  What are the types of advance directives? There are many types of advance directives, and each state has rules about how to use them  You may choose a combination of any of the following:  · Living will: This is a written record of the treatment you want  You can also choose which treatments you do not want, which to limit, and which to stop at a certain time  This includes surgery, medicine, IV fluid, and tube feedings  · Durable power of  for healthcare [facility]): This is a written record that states who you want to make healthcare choices for you when you are unable to make them for yourself  This person, called a proxy, is usually a family member or a friend  You may choose more than 1 proxy  · Do not resuscitate (DNR) order:  A DNR order is used in case your heart stops beating or you stop breathing  It is a request not to have certain forms of treatment, such as CPR  A DNR order may be included in other types of advance directives  · Medical directive:   This covers the care that you want if you are in a coma, near death, or unable to make decisions for yourself  You can list the treatments you want for each condition  Treatment may include pain medicine, surgery, blood transfusions, dialysis, IV or tube feedings, and a ventilator (breathing machine)  · Values history: This document has questions about your views, beliefs, and how you feel and think about life  This information can help others choose the care that you would choose  Why are advance directives important? An advance directive helps you control your care  Although spoken wishes may be used, it is better to have your wishes written down  Spoken wishes can be misunderstood, or not followed  Treatments may be given even if you do not want them  An advance directive may make it easier for your family to make difficult choices about your care  Weight Management   Why it is important to manage your weight:  Being overweight increases your risk of health conditions such as heart disease, high blood pressure, type 2 diabetes, and certain types of cancer  It can also increase your risk for osteoarthritis, sleep apnea, and other respiratory problems  Aim for a slow, steady weight loss  Even a small amount of weight loss can lower your risk of health problems  How to lose weight safely:  A safe and healthy way to lose weight is to eat fewer calories and get regular exercise  You can lose up about 1 pound a week by decreasing the number of calories you eat by 500 calories each day  Healthy meal plan for weight management:  A healthy meal plan includes a variety of foods, contains fewer calories, and helps you stay healthy  A healthy meal plan includes the following:  · Eat whole-grain foods more often  A healthy meal plan should contain fiber  Fiber is the part of grains, fruits, and vegetables that is not broken down by your body  Whole-grain foods are healthy and provide extra fiber in your diet   Some examples of whole-grain foods are whole-wheat breads and pastas, oatmeal, brown rice, and bulgur  · Eat a variety of vegetables every day  Include dark, leafy greens such as spinach, kale, radha greens, and mustard greens  Eat yellow and orange vegetables such as carrots, sweet potatoes, and winter squash  · Eat a variety of fruits every day  Choose fresh or canned fruit (canned in its own juice or light syrup) instead of juice  Fruit juice has very little or no fiber  · Eat low-fat dairy foods  Drink fat-free (skim) milk or 1% milk  Eat fat-free yogurt and low-fat cottage cheese  Try low-fat cheeses such as mozzarella and other reduced-fat cheeses  · Choose meat and other protein foods that are low in fat  Choose beans or other legumes such as split peas or lentils  Choose fish, skinless poultry (chicken or turkey), or lean cuts of red meat (beef or pork)  Before you cook meat or poultry, cut off any visible fat  · Use less fat and oil  Try baking foods instead of frying them  Add less fat, such as margarine, sour cream, regular salad dressing and mayonnaise to foods  Eat fewer high-fat foods  Some examples of high-fat foods include french fries, doughnuts, ice cream, and cakes  · Eat fewer sweets  Limit foods and drinks that are high in sugar  This includes candy, cookies, regular soda, and sweetened drinks  Exercise:  Exercise at least 30 minutes per day on most days of the week  Some examples of exercise include walking, biking, dancing, and swimming  You can also fit in more physical activity by taking the stairs instead of the elevator or parking farther away from stores  Ask your healthcare provider about the best exercise plan for you  © Copyright [employer] 2018 Information is for End User's use only and may not be sold, redistributed or otherwise used for commercial purposes   All illustrations and images included in CareNotes® are the copyrighted property of [employer]  or [address] [place] [address] [facility]

## 2023-05-15 NOTE — PROGRESS NOTES
Assessment and Plan:     Problem List Items Addressed This Visit        Endocrine    Type 2 diabetes mellitus without complication, without long-term current use of insulin ([address] )     Diet controlled  We will continue to monitor  Lab Results   Component Value Date    HGBA1C 6 7 (H) 04/26/2023            Relevant Orders    Hemoglobin A1C    Comprehensive metabolic panel    UA w Reflex to Microscopic w Reflex to Culture - Clinic Collect (Completed)    Urine Microscopic (Completed)       Cardiovascular and Mediastinum    Essential hypertension     Well-controlled  Continue losartan 50 mg daily and HCTZ 25 mg daily  Relevant Medications    losartan (COZAAR) 50 mg tablet    hydrochlorothiazide (HYDRODIURIL) 25 mg tablet    potassium chloride (K-DUR,KLOR-CON) 10 mEq tablet       Musculoskeletal and Integument    Osteoporosis     Patient continues on Prolia via rheumatology plus calcium and vitamin D supplementation  She will be due for her next DEXA in October 2024  Other    Obesity (BMI 30 0-34  9)     BMI Counseling: Body mass index is 31 5 kg/m²  The BMI is above normal  Nutrition recommendations include moderation in carbohydrate intake  Exercise recommendations include exercising 3-5 times per week  Heart murmur     New onset  Check echo  Relevant Orders    Echo complete w/ contrast if indicated   Other Visit Diagnoses     Medicare annual wellness visit, subsequent    -  Primary          Depression Screening and Follow-up Plan: Patient was screened for depression during today's encounter  They screened negative with a PHQ-2 score of 0  Preventive health issues were discussed with patient, and age appropriate screening tests were ordered as noted in patient's After Visit Summary  Personalized health advice and appropriate referrals for health education or preventive services given if needed, as noted in patient's After Visit Summary       History of Present Illness: Patient presents for a Medicare Wellness Visit    DM 2 - diet controlled - A1c was 6 7% last month    Hypertension - on losartan 50 mg daily and HCTZ 25 mg daily - sees similar numbers at home    Osteoporosis - continues Prolia through rheumatology plus calcium and vitamin D supplementation - due for next DEXA scan in October 2024    Hypokalemia - on potassium chloride 10 mill equivalents daily - level last month was 3 5    Previous right knee pain improved with PT, which she completed about 1 month ago - notes that she still has pain at times but no longer needs medication for it - notes that she is still doing the exercises daily at home     Patient Care Team:  Rubio Garzon MD as PCP - General (Family Medicine)  Carlotta Dance, MD Sonda Leo, MD     Review of Systems:     Review of Systems   Constitutional: Negative for chills and fever  HENT: Negative for congestion, postnasal drip, rhinorrhea and sore throat  Eyes: Negative for visual disturbance  Respiratory: Negative for cough, shortness of breath and wheezing  Cardiovascular: Positive for leg swelling (in the right leg - only in the warm weather over the last few years)  Negative for chest pain and palpitations  Gastrointestinal: Negative for abdominal pain, constipation, diarrhea, nausea and vomiting  Endocrine: Negative for polydipsia and polyuria  Genitourinary: Negative for dysuria and frequency  Musculoskeletal: Positive for arthralgias (right knee)  Negative for myalgias  Skin: Negative for rash  Neurological: Negative for dizziness, syncope and headaches  Hematological: Does not bruise/bleed easily  Psychiatric/Behavioral: Negative for dysphoric mood  The patient is not nervous/anxious  Problem List:     Patient Active Problem List   Diagnosis   • Dietary calcium deficiency   • Obesity (BMI 30 0-34  9)   • Osteoporosis   • Hx of colonic polyps   • Heart murmur   • Type 2 diabetes mellitus without complication, without long-term current use of insulin (HCC)   • Thrombocytopenia (HCC)   • Essential hypertension   • Hypokalemia   • Low hematocrit   • Leg swelling   • Primary generalized (osteo)arthritis   • Lumbar spondylosis   • Acute left-sided low back pain without sciatica   • Acute pain of right knee      Past Medical and Surgical History:     Past Medical History:   Diagnosis Date   • Chicken pox    • Diabetes mellitus ([facility] [address] )    • Fall 2019   • Hypertension    • Kidney stones    • Malignant neoplasm of skin     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   • Osteoarthritis    • Osteoporosis      Past Surgical History:   Procedure Laterality Date   • COLONOSCOPY     • COLONOSCOPY      COMPLETE; DUE 3 YRS   • COLONOSCOPY  2018    polyp x1   • DILATION AND CURETTAGE OF UTERUS  2013    THICKENED ENDO ON USG, CVX STENOSIS, PATH: BENIGN POLYP FRAGMENTS AND INACTIVE ENDOMETRIUM  NO NEOPLASIA ; IMPRESSION: 10/27/14; Scararianna Hogan   • HYSTEROSCOPY     • MOHS SURGERY  2020    BCC on face   • TONSILLECTOMY        Family History:     Family History   Problem Relation Age of Onset   • Kidney cancer Mother 79        RENAL CANCER   • Hypertension Mother    • Skin cancer Mother    • Osteoporosis Mother    • Osteoporotic fracture Mother         hip - age 80    • Osteoporotic fracture Paternal Grandmother       Social History:     Social History     Socioeconomic History   • Marital status: Single     Spouse name: None   • Number of children: None   • Years of education: None   • Highest education level: None   Occupational History   • Occupation: [employer]   Tobacco Use   • Smoking status: Former     Packs/day: 0 50     Years: 5 00     Pack years: 2 50     Types: Cigarettes     Quit date: 1970     Years since quittin 4   • Smokeless tobacco: Never   • Tobacco comments:     STARTED AT AGE 17   STOPPED AT AGE 23     Vaping Use   • Vaping Use: Never used   Substance and Sexual Activity   • Alcohol use: Yes     Comment: Wine for Special Occassion   • Drug use: Never   • Sexual activity: None   Other Topics Concern   • None   Social History Narrative    EXERCISES MODERATELY LESS THAN 3 TIMES WEEK     Social Determinants of Health     Financial Resource Strain: Low Risk    • Difficulty of Paying Living Expenses: Not hard at all   Food Insecurity: Not on file   Transportation Needs: No Transportation Needs   • Lack of Transportation (Medical): No   • Lack of Transportation (Non-Medical):  No   Physical Activity: Not on file   Stress: Not on file   Social Connections: Not on file   Intimate Partner Violence: Not on file   Housing Stability: Not on file      Medications and Allergies:     Current Outpatient Medications   Medication Sig Dispense Refill   • Calcium Carbonate-Vitamin D 600-200 MG-UNIT TABS Take 1 tablet by mouth 2 (two) times a day     • Cholecalciferol (VITAMIN D3) 1000 units CAPS Take by mouth daily       • denosumab (PROLIA) 60 mg/mL Inject under the skin every 6 (six) months       • hydrochlorothiazide (HYDRODIURIL) 25 mg tablet Take 1 tablet (25 mg total) by mouth daily 30 tablet 5   • losartan (COZAAR) 50 mg tablet Take 1 tablet (50 mg total) by mouth daily 30 tablet 5   • Multiple Vitamins-Minerals (DAILY MULTI PO) Take by mouth daily       • potassium chloride (K-DUR,KLOR-CON) 10 mEq tablet Take 1 tablet (10 mEq total) by mouth daily 30 tablet 5     Current Facility-Administered Medications   Medication Dose Route Frequency Provider Last Rate Last Admin   • denosumab (PROLIA) subcutaneous injection 60 mg  60 mg Subcutaneous Q6 Months Sebastian Thompson MD   60 mg at 11/10/22 1536     No Known Allergies   Immunizations:     Immunization History   Administered Date(s) Administered   • COVID-19 MODERNA VACC 0 5 ML IM 02/06/2021, 03/09/2021, 01/03/2022   • INFLUENZA 10/24/2007, 10/23/2017, 11/14/2022   • Influenza Split High Dose Preservative Free IM 10/24/2018   • Influenza, high dose seasonal 0 7 mL 09/26/2019, 09/24/2021, 11/14/2022   • Influenza, seasonal, injectable 10/24/2007   • Pneumococcal Conjugate 13-Valent 10/23/2017   • Pneumococcal Polysaccharide PPV23 10/24/2018   • Tdap 04/30/2019   • Zoster 06/17/2016      Health Maintenance:         Topic Date Due   • Breast Cancer Screening: Mammogram  04/21/2023   • Colorectal Cancer Screening  07/19/2024   • Hepatitis C Screening  Completed     There are no preventive care reminders to display for this patient  Medicare Screening Tests and Risk Assessments:     Cheri Ontiveros is here for her Subsequent Wellness visit  Health Risk Assessment:   Patient rates overall health as excellent  Patient feels that their physical health rating is same  Patient is very satisfied with their life  Eyesight was rated as same  Hearing was rated as same  Patient feels that their emotional and mental health rating is same  Patients states they are never, rarely angry  Patient states they are never, rarely unusually tired/fatigued  Pain experienced in the last 7 days has been some  Patient's pain rating has been 5/10  Patient states that she has experienced no weight loss or gain in last 6 months  Depression Screening:   PHQ-2 Score: 0      Fall Risk Screening: In the past year, patient has experienced: no history of falling in past year      Urinary Incontinence Screening:   Patient has not leaked urine accidently in the last six months  Home Safety:  Patient has trouble with stairs inside or outside of their home  Patient has working smoke alarms and has working carbon monoxide detector  Home safety hazards include: none  Nutrition:   Current diet is Regular  Medications:   Patient is currently taking over-the-counter supplements  OTC medications include: see medication list  Patient is able to manage medications       Activities of Daily Living (ADLs)/Instrumental Activities of Daily Living (IADLs):   Walk and transfer into and out of bed and chair?: Yes  Dress "and groom yourself?: Yes    Bathe or shower yourself?: Yes    Feed yourself? Yes  Do your laundry/housekeeping?: Yes  Manage your money, pay your bills and track your expenses?: Yes  Make your own meals?: Yes    Do your own shopping?: Yes    Previous Hospitalizations:   Any hospitalizations or ED visits within the last 12 months?: No      Advance Care Planning:   Living will: No    Durable POA for healthcare: No    Advanced directive: No      Comments: Notes there is nothing documented     Cognitive Screening:   Provider or family/friend/caregiver concerned regarding cognition?: No    PREVENTIVE SCREENINGS      Cardiovascular Screening:    General: Screening Current      Diabetes Screening:     General: Screening Not Indicated and History Diabetes      Colorectal Cancer Screening:     General: Screening Current      Breast Cancer Screening:     General: Screening Current      Cervical Cancer Screening:    General: Screening Not Indicated      Osteoporosis Screening:    General: Screening Not Indicated and History Osteoporosis      Abdominal Aortic Aneurysm (AAA) Screening:        General: Screening Not Indicated      Lung Cancer Screening:     General: Screening Not Indicated      Hepatitis C Screening:    General: Screening Current    Screening, Brief Intervention, and Referral to Treatment (SBIRT)    Screening  Typical number of drinks in a day: 0  Typical number of drinks in a week: 0  Interpretation: Low risk drinking behavior  Single Item Drug Screening:  How often have you used an illegal drug (including marijuana) or a prescription medication for non-medical reasons in the past year? never    Single Item Drug Screen Score: 0  Interpretation: Negative screen for possible drug use disorder    No results found       Physical Exam:     /60 (BP Location: Left arm, Patient Position: Sitting, Cuff Size: Large)   Pulse 75   Temp (!) 97 °F (36 1 °C) (Tympanic)   Resp 14   Ht 4' 11 5\" (1 511 m)   Wt 71 9 " kg (158 lb 9 6 oz)   SpO2 97%   BMI 31 50 kg/m²     Physical Exam  Vitals reviewed  Constitutional:       General: She is not in acute distress  Appearance: Normal appearance  She is well-developed  She is obese  She is not ill-appearing  HENT:      Head: Normocephalic and atraumatic  Right Ear: External ear normal       Left Ear: External ear normal       Nose: Nose normal       Mouth/Throat:      Pharynx: No oropharyngeal exudate  Eyes:      Conjunctiva/sclera: Conjunctivae normal       Pupils: Pupils are equal, round, and reactive to light  Neck:      Thyroid: No thyromegaly  Cardiovascular:      Rate and Rhythm: Normal rate and regular rhythm  Pulses: Normal pulses  Heart sounds: Murmur heard  Systolic murmur is present  Pulmonary:      Effort: Pulmonary effort is normal       Breath sounds: Normal breath sounds  No wheezing or rales  Abdominal:      General: Bowel sounds are normal  There is no distension  Palpations: Abdomen is soft  There is no mass  Tenderness: There is no abdominal tenderness  Musculoskeletal:      Cervical back: Normal range of motion and neck supple  Right lower leg: Edema present  Left lower leg: No edema  Lymphadenopathy:      Cervical: No cervical adenopathy  Skin:     General: Skin is warm and dry  Findings: No rash  Neurological:      Mental Status: She is alert  Sensory: No sensory deficit        Comments: 5/5 strength in UE and LE   Psychiatric:         Behavior: Behavior normal           Tereza Jones PA-C

## 2023-05-16 ENCOUNTER — TELEPHONE (OUTPATIENT)
Dept: ADMINISTRATIVE | Facility: OTHER | Age: 78
End: 2023-05-16

## 2023-05-16 LAB
AMORPH URATE CRY URNS QL MICRO: ABNORMAL
BACTERIA UR QL AUTO: ABNORMAL /HPF
BILIRUB UR QL STRIP: NEGATIVE
CLARITY UR: CLEAR
COLOR UR: YELLOW
GLUCOSE UR STRIP-MCNC: NEGATIVE MG/DL
HGB UR QL STRIP.AUTO: ABNORMAL
KETONES UR STRIP-MCNC: NEGATIVE MG/DL
LEUKOCYTE ESTERASE UR QL STRIP: ABNORMAL
MUCOUS THREADS UR QL AUTO: ABNORMAL
NITRITE UR QL STRIP: POSITIVE
NON-SQ EPI CELLS URNS QL MICRO: ABNORMAL /HPF
PH UR STRIP.AUTO: 7.5 [PH]
PROT UR STRIP-MCNC: NEGATIVE MG/DL
RBC #/AREA URNS AUTO: ABNORMAL /HPF
SP GR UR STRIP.AUTO: 1.02 (ref 1–1.03)
UROBILINOGEN UR STRIP-ACNC: <2 MG/DL
WBC #/AREA URNS AUTO: ABNORMAL /HPF

## 2023-05-16 NOTE — LETTER
Diabetic Eye Exam Form    Date Requested: 23  Patient: Sunshine Sifuentes  Patient : 1945   Referring Provider: Aguilar Aguilera MD      DIABETIC Eye Exam Date _______________________________      Type of Exam MUST be documented for Diabetic Eye Exams  Please CHECK ONE  Retinal Exam       Dilated Retinal Exam       OCT       Optomap-Iris Exam      Fundus Photography       Left Eye - Please check Retinopathy or No Retinopathy        Exam did show retinopathy    Exam did not show retinopathy       Right Eye - Please check Retinopathy or No Retinopathy       Exam did show retinopathy    Exam did not show retinopathy       Comments __________________________________________________________    Practice Providing Exam ______________________________________________    Exam Performed By (print name) _______________________________________      Provider Signature ___________________________________________________      These reports are needed for  compliance  Please fax this completed form and a copy of the Diabetic Eye Exam report to our office located at [place] [address] as soon as possible via Fax [phone] attention Tanesha: Phone [phone]  We thank you for your assistance in treating our mutual patient

## 2023-05-16 NOTE — TELEPHONE ENCOUNTER
Upon review of the In Basket request and the patient's chart, initial outreach has been made via fax to facility  Please see Contacts section for details       Thank you  Jaun Moyer MA

## 2023-05-16 NOTE — TELEPHONE ENCOUNTER
----- Message from Casandra Suarez PA-C sent at 5/15/2023  4:04 PM EDT -----  Regarding: Eye exam  05/15/23 4:04 PM    Hello, our patient Aravind Choi has had Diabetic Eye Exam completed/performed  Please assist in updating the patient chart by making an External outreach to [facility] facility located in [facility] (Dr Edward Couch)  The date of service is 1/2023      Thank you,  Casandra Suarez PA-C  [facility]

## 2023-05-19 NOTE — TELEPHONE ENCOUNTER
Upon review of the In Basket request we were able to locate, review, and update the patient chart as requested for Diabetic Eye Exam     Any additional questions or concerns should be emailed to the Practice Liaisons via the appropriate education email address, please do not reply via In Basket      Thank you  Hector Rangel MA

## 2023-05-22 NOTE — ASSESSMENT & PLAN NOTE
BMI Counseling: Body mass index is 31 5 kg/m²  The BMI is above normal  Nutrition recommendations include moderation in carbohydrate intake  Exercise recommendations include exercising 3-5 times per week

## 2023-05-22 NOTE — ASSESSMENT & PLAN NOTE
Patient continues on Prolia via rheumatology plus calcium and vitamin D supplementation  She will be due for her next DEXA in October 2024

## 2023-05-22 NOTE — ASSESSMENT & PLAN NOTE
Diet controlled  We will continue to monitor    Lab Results   Component Value Date    HGBA1C 6 7 (H) 04/26/2023

## 2023-06-13 ENCOUNTER — HOSPITAL ENCOUNTER (OUTPATIENT)
Dept: NON INVASIVE DIAGNOSTICS | Facility: HOSPITAL | Age: 78
Discharge: HOME | End: 2023-06-13
Payer: MEDICARE

## 2023-06-13 VITALS
DIASTOLIC BLOOD PRESSURE: 60 MMHG | SYSTOLIC BLOOD PRESSURE: 110 MMHG | WEIGHT: 158 LBS | HEIGHT: 59 IN | BODY MASS INDEX: 31.85 KG/M2 | HEART RATE: 75 BPM

## 2023-06-13 DIAGNOSIS — R01.1 HEART MURMUR: ICD-10-CM

## 2023-06-13 PROCEDURE — 93306 TTE W/DOPPLER COMPLETE: CPT

## 2023-06-14 LAB
AORTIC ROOT: 3 CM
AORTIC VALVE MEAN VELOCITY: 11.4 M/S
APICAL FOUR CHAMBER EJECTION FRACTION: 62 %
ASCENDING AORTA: 3.3 CM
AV AREA BY CONTINUOUS VTI: 2.3 CM2
AV AREA PEAK VELOCITY: 2.1 CM2
AV LVOT MEAN GRADIENT: 2 MMHG
AV LVOT PEAK GRADIENT: 5 MMHG
AV MEAN GRADIENT: 6 MMHG
AV PEAK GRADIENT: 11 MMHG
AV VALVE AREA: 2.25 CM2
AV VELOCITY RATIO: 0.66
DOP CALC AO PEAK VEL: 1.66 M/S
DOP CALC AO VTI: 30.71 CM
DOP CALC LVOT AREA: 3.14 CM2
DOP CALC LVOT DIAMETER: 2 CM
DOP CALC LVOT PEAK VEL VTI: 22.02 CM
DOP CALC LVOT PEAK VEL: 1.1 M/S
DOP CALC LVOT STROKE INDEX: 42.3 ML/M2
DOP CALC LVOT STROKE VOLUME: 69.14 CM3
E WAVE DECELERATION TIME: 295 MS
FRACTIONAL SHORTENING: 37 % (ref 28–44)
INTERVENTRICULAR SEPTUM IN DIASTOLE (PARASTERNAL SHORT AXIS VIEW): 0.9 CM
INTERVENTRICULAR SEPTUM: 0.9 CM (ref 0.6–1.1)
LAAS-AP2: 24 CM2
LAAS-AP4: 18.2 CM2
LEFT ATRIUM SIZE: 3.2 CM
LEFT INTERNAL DIMENSION IN SYSTOLE: 2.7 CM (ref 2.1–4)
LEFT VENTRICULAR INTERNAL DIMENSION IN DIASTOLE: 4.3 CM (ref 3.5–6)
LEFT VENTRICULAR POSTERIOR WALL IN END DIASTOLE: 0.8 CM
LEFT VENTRICULAR STROKE VOLUME: 55 ML
LVSV (TEICH): 55 ML
MV E'TISSUE VEL-LAT: 14 CM/S
MV E'TISSUE VEL-SEP: 12 CM/S
MV PEAK A VEL: 1.02 M/S
MV PEAK E VEL: 66 CM/S
MV STENOSIS PRESSURE HALF TIME: 86 MS
MV VALVE AREA P 1/2 METHOD: 2.56 CM2
RIGHT ATRIUM AREA SYSTOLE A4C: 14.8 CM2
RIGHT VENTRICLE ID DIMENSION: 3.7 CM
SINOTUBULAR JUNCTION: 2.6 CM
SL CV LEFT ATRIUM LENGTH A2C: 5.7 CM
SL CV LV EF: 67
SL CV PED ECHO LEFT VENTRICLE DIASTOLIC VOLUME (MOD BIPLANE) 2D: 82 ML
SL CV PED ECHO LEFT VENTRICLE SYSTOLIC VOLUME (MOD BIPLANE) 2D: 27 ML
SL CV SINUS OF VALSALVA 2D: 3.1 CM
STJ: 2.6 CM
TR MAX PG: 27 MMHG
TR PEAK VELOCITY: 2.6 M/S
TRICUSPID ANNULAR PLANE SYSTOLIC EXCURSION: 2.6 CM
TRICUSPID VALVE PEAK REGURGITATION VELOCITY: 2.58 M/S

## 2023-06-14 PROCEDURE — 93306 TTE W/DOPPLER COMPLETE: CPT | Performed by: INTERNAL MEDICINE

## 2023-07-20 ENCOUNTER — TELEPHONE (OUTPATIENT)
Dept: ADMINISTRATIVE | Facility: OTHER | Age: 78
End: 2023-07-20

## 2023-07-20 NOTE — TELEPHONE ENCOUNTER
----- Message from Kendra Loja sent at 7/17/2023  8:42 AM EDT -----  Regarding: care gap request  07/17/23 8:42 AM    Hello, our patient attached above has had Mammogram completed/performed. Please assist in updating the patient chart by pulling the Care Everywhere (CE) document. The date of service is 7/13/23.      Thank you,  Kendra Coker

## 2023-07-20 NOTE — TELEPHONE ENCOUNTER
Upon review of the In Basket request we were able to locate, review, and update the patient chart as requested for Mammogram.    Any additional questions or concerns should be emailed to the Practice Liaisons via the appropriate education email address, please do not reply via In Basket.     Thank you  Srikanth Leonard

## 2023-08-30 ENCOUNTER — TELEPHONE (OUTPATIENT)
Age: 78
End: 2023-08-30

## 2023-08-30 NOTE — TELEPHONE ENCOUNTER
Caller: Patient    Doctor: Elo Floyd    Reason for call: Pt is scheduled to get a tooth pulled on 9/7 and was advised to reach out and ask the Dr if this would interfere with her upcoming Prolia injection in November.   Please advise     Call back#: [phone]

## 2023-08-31 NOTE — TELEPHONE ENCOUNTER
Please let her know that it should be fine as long as her dentist feels the area is fully healed by the time she comes in for the Prolia injection

## 2023-10-28 DIAGNOSIS — I10 ESSENTIAL HYPERTENSION: ICD-10-CM

## 2023-10-28 RX ORDER — LOSARTAN POTASSIUM 50 MG/1
50 TABLET ORAL DAILY
Qty: 30 TABLET | Refills: 5 | Status: SHIPPED | OUTPATIENT
Start: 2023-10-28

## 2023-11-01 ENCOUNTER — TELEPHONE (OUTPATIENT)
Dept: OBGYN CLINIC | Facility: HOSPITAL | Age: 78
End: 2023-11-01

## 2023-11-01 NOTE — TELEPHONE ENCOUNTER
Caller: Patient     Doctor: Rogelia Eisenmenger     Reason for call: Patient asked about her blood work, also the appointment for her polia injection was cancelled and patient would like to reschedule this, the next available appointment is 4/15     Call back#: [phone]

## 2023-11-01 NOTE — TELEPHONE ENCOUNTER
Caller: Patient    Doctor: Cedric Chang    Reason for call: Patient is asking if she needs lab work completed before upcoming appt 11-16-23. Patient stated she does not have a lab script.     Call back#: [phone]

## 2023-11-02 LAB
ALBUMIN SERPL-MCNC: 3.4 G/DL (ref 3.6–5.1)
ALBUMIN/GLOB SERPL: 1.1 (CALC) (ref 1–2.5)
ALP SERPL-CCNC: 86 U/L (ref 37–153)
ALT SERPL-CCNC: 18 U/L (ref 6–29)
AST SERPL-CCNC: 39 U/L (ref 10–35)
BILIRUB SERPL-MCNC: 1.9 MG/DL (ref 0.2–1.2)
BUN SERPL-MCNC: 15 MG/DL (ref 7–25)
BUN/CREAT SERPL: ABNORMAL (CALC) (ref 6–22)
CALCIUM SERPL-MCNC: 10.1 MG/DL (ref 8.6–10.4)
CHLORIDE SERPL-SCNC: 104 MMOL/L (ref 98–110)
CO2 SERPL-SCNC: 27 MMOL/L (ref 20–32)
CREAT SERPL-MCNC: 0.78 MG/DL (ref 0.6–1)
GFR/BSA.PRED SERPLBLD CYS-BASED-ARV: 78 ML/MIN/1.73M2
GLOBULIN SER CALC-MCNC: 3.2 G/DL (CALC) (ref 1.9–3.7)
GLUCOSE SERPL-MCNC: 121 MG/DL (ref 65–99)
HBA1C MFR BLD: 6.8 % OF TOTAL HGB
POTASSIUM SERPL-SCNC: 3.4 MMOL/L (ref 3.5–5.3)
PROT SERPL-MCNC: 6.6 G/DL (ref 6.1–8.1)
SODIUM SERPL-SCNC: 139 MMOL/L (ref 135–146)

## 2023-11-02 NOTE — TELEPHONE ENCOUNTER
Caller: Patient     Doctor: Taniya Hamm     Reason for call:  : Patient asked about her blood work, also the appointment for her polia injection was cancelled and patient would like to reschedule this, the next available appointment is 4/15  Patient stated she does not have a lab script. She is asking if she can get in sooner than April, and needs her lab script. This is her 3rd call.       Call back#: [phone]

## 2023-11-03 NOTE — TELEPHONE ENCOUNTER
Please let her know that I don't need any more labs currently, I saw her recent labs, and please at least schedule her for Prolia only MA injection visit next week. We can try scheduling her for her next follow-up with me plus Prolia after this visit.

## 2023-11-03 NOTE — TELEPHONE ENCOUNTER
Caller: Patient    Doctor: Jelly Chaudhry    Reason for call: Patient is calling back and is asking to speak with Dr. Jelly Chaudhry. I offered her an appointment on 11/15/23 at 11am but the schedule is blocked and I was unable to add the appointment. That date and time works for her and she wrote it down on her calendar. She does want a call back.     Call back#: [phone]

## 2023-11-03 NOTE — TELEPHONE ENCOUNTER
Will call her, but please put her on my schedule for Nov. 15th for Prolia plus provider visit at 11am

## 2023-11-03 NOTE — TELEPHONE ENCOUNTER
Actually, her Prolia plus provider visit can be scheduled for Wednesday, Nov. 15th, we have plenty of slots

## 2023-11-03 NOTE — TELEPHONE ENCOUNTER
Patient returned call from Dr. James Peacock. Per the notes, advised patient that someone will reach out again later today.     # [phone]

## 2023-11-03 NOTE — TELEPHONE ENCOUNTER
Patient has been put on the schedule and would you like me to be the one to reach out to the patient?  Please Padmini Cruz

## 2023-11-03 NOTE — TELEPHONE ENCOUNTER
I just called her back, it went to voicemail. You can try calling her back again later today to see if she picks up.

## 2023-11-04 DIAGNOSIS — I10 ESSENTIAL HYPERTENSION: ICD-10-CM

## 2023-11-04 RX ORDER — HYDROCHLOROTHIAZIDE 25 MG/1
25 TABLET ORAL DAILY
Qty: 30 TABLET | Refills: 0 | Status: SHIPPED | OUTPATIENT
Start: 2023-11-04

## 2023-11-11 DIAGNOSIS — I10 ESSENTIAL HYPERTENSION: ICD-10-CM

## 2023-11-12 RX ORDER — POTASSIUM CHLORIDE 750 MG/1
10 TABLET, EXTENDED RELEASE ORAL DAILY
Qty: 30 TABLET | Refills: 5 | Status: SHIPPED | OUTPATIENT
Start: 2023-11-12 | End: 2023-11-15 | Stop reason: SDUPTHER

## 2023-11-13 ENCOUNTER — RA CDI HCC (OUTPATIENT)
Dept: OTHER | Facility: HOSPITAL | Age: 78
End: 2023-11-13

## 2023-11-15 ENCOUNTER — OFFICE VISIT (OUTPATIENT)
Dept: RHEUMATOLOGY | Facility: CLINIC | Age: 78
End: 2023-11-15
Payer: MEDICARE

## 2023-11-15 ENCOUNTER — OFFICE VISIT (OUTPATIENT)
Dept: FAMILY MEDICINE CLINIC | Facility: CLINIC | Age: 78
End: 2023-11-15
Payer: MEDICARE

## 2023-11-15 VITALS
BODY MASS INDEX: 31.61 KG/M2 | HEART RATE: 81 BPM | HEIGHT: 60 IN | SYSTOLIC BLOOD PRESSURE: 112 MMHG | DIASTOLIC BLOOD PRESSURE: 64 MMHG | WEIGHT: 161 LBS | OXYGEN SATURATION: 99 %

## 2023-11-15 VITALS
TEMPERATURE: 98.2 F | HEART RATE: 81 BPM | HEIGHT: 60 IN | WEIGHT: 159 LBS | RESPIRATION RATE: 16 BRPM | OXYGEN SATURATION: 99 % | SYSTOLIC BLOOD PRESSURE: 120 MMHG | DIASTOLIC BLOOD PRESSURE: 66 MMHG | BODY MASS INDEX: 31.22 KG/M2

## 2023-11-15 DIAGNOSIS — M81.0 AGE-RELATED OSTEOPOROSIS WITHOUT CURRENT PATHOLOGICAL FRACTURE: Primary | ICD-10-CM

## 2023-11-15 DIAGNOSIS — M81.0 AGE-RELATED OSTEOPOROSIS WITHOUT CURRENT PATHOLOGICAL FRACTURE: ICD-10-CM

## 2023-11-15 DIAGNOSIS — E87.6 HYPOKALEMIA: ICD-10-CM

## 2023-11-15 DIAGNOSIS — E11.9 TYPE 2 DIABETES MELLITUS WITHOUT COMPLICATION, WITHOUT LONG-TERM CURRENT USE OF INSULIN (HCC): Primary | ICD-10-CM

## 2023-11-15 DIAGNOSIS — Z79.899 HIGH RISK MEDICATION USE: ICD-10-CM

## 2023-11-15 DIAGNOSIS — E66.9 OBESITY (BMI 30.0-34.9): ICD-10-CM

## 2023-11-15 DIAGNOSIS — I10 ESSENTIAL HYPERTENSION: ICD-10-CM

## 2023-11-15 DIAGNOSIS — E55.9 VITAMIN D DEFICIENCY: ICD-10-CM

## 2023-11-15 DIAGNOSIS — Z23 ENCOUNTER FOR IMMUNIZATION: ICD-10-CM

## 2023-11-15 LAB
CREAT UR-MCNC: 75.6 MG/DL
MICROALBUMIN UR-MCNC: 10.9 MG/L
MICROALBUMIN/CREAT 24H UR: 14 MG/G CREATININE (ref 0–30)

## 2023-11-15 PROCEDURE — 82570 ASSAY OF URINE CREATININE: CPT | Performed by: PHYSICIAN ASSISTANT

## 2023-11-15 PROCEDURE — G0008 ADMIN INFLUENZA VIRUS VAC: HCPCS

## 2023-11-15 PROCEDURE — 96372 THER/PROPH/DIAG INJ SC/IM: CPT

## 2023-11-15 PROCEDURE — 82043 UR ALBUMIN QUANTITATIVE: CPT | Performed by: PHYSICIAN ASSISTANT

## 2023-11-15 PROCEDURE — 99214 OFFICE O/P EST MOD 30 MIN: CPT

## 2023-11-15 PROCEDURE — 90662 IIV NO PRSV INCREASED AG IM: CPT

## 2023-11-15 PROCEDURE — 99214 OFFICE O/P EST MOD 30 MIN: CPT | Performed by: PHYSICIAN ASSISTANT

## 2023-11-15 RX ORDER — POTASSIUM CHLORIDE 750 MG/1
10 TABLET, EXTENDED RELEASE ORAL DAILY
Qty: 90 TABLET | Refills: 1 | Status: SHIPPED | OUTPATIENT
Start: 2023-11-15

## 2023-11-15 RX ORDER — IBUPROFEN 600 MG/1
TABLET ORAL
COMMUNITY
Start: 2023-09-07

## 2023-11-15 RX ORDER — LOSARTAN POTASSIUM 50 MG/1
50 TABLET ORAL DAILY
Qty: 90 TABLET | Refills: 1 | Status: SHIPPED | OUTPATIENT
Start: 2023-11-15

## 2023-11-15 RX ORDER — CHLORHEXIDINE GLUCONATE ORAL RINSE 1.2 MG/ML
SOLUTION DENTAL
COMMUNITY
Start: 2023-09-07

## 2023-11-15 RX ORDER — HYDROCHLOROTHIAZIDE 25 MG/1
25 TABLET ORAL DAILY
Qty: 90 TABLET | Refills: 1 | Status: SHIPPED | OUTPATIENT
Start: 2023-11-15

## 2023-11-15 NOTE — PROGRESS NOTES
FAMILY PRACTICE OFFICE VISIT  [facility] - [facility] PRIMARY CARE       NAME: Maggie Dia  AGE: 69 y.o. SEX: female       : 1945        MRN: [id number]    DATE: 2023  TIME: 3:25 AM    Assessment and Plan     Problem List Items Addressed This Visit          Endocrine    Type 2 diabetes mellitus without complication, without long-term current use of insulin ([address]) - Primary     Diet controlled. We will continue to monitor. Lab Results   Component Value Date    HGBA1C 6.8 (H) 2023          Relevant Orders    CBC and differential    Comprehensive metabolic panel    Lipid Panel with Direct LDL reflex    Hemoglobin A1C    Vitamin D 25 hydroxy    TSH, 3rd generation with Free T4 reflex    Albumin / creatinine urine ratio (Completed)       Cardiovascular and Mediastinum    Essential hypertension     Controlled. Continue losartan 50 mg daily and hydrochlorothiazide 25 mg daily. Relevant Medications    hydrochlorothiazide (HYDRODIURIL) 25 mg tablet    losartan (COZAAR) 50 mg tablet    potassium chloride (K-DUR,KLOR-CON) 10 mEq tablet       Musculoskeletal and Integument    Osteoporosis     On Prolia through rheumatology. Continue also on calcium and vitamin D. Due for next DEXA in 2024. Relevant Orders    Comprehensive metabolic panel    Vitamin D 25 hydroxy       Other    Obesity (BMI 30.0-34. 9)     BMI Counseling: Body mass index is 31.05 kg/m². The BMI is above normal. Nutrition recommendations include 3-5 servings of fruits/vegetables daily. Exercise recommendations include exercising 3-5 times per week. Hypokalemia     Slightly low on most recent labs at 3.4. We will continue current KCl 10 mill equivalents daily and monitor.          Relevant Orders    Comprehensive metabolic panel     Other Visit Diagnoses       Encounter for immunization        Relevant Orders    influenza vaccine, high-dose, PF 0.7 mL (FLUZONE HIGH-DOSE) (Completed)            Type 2 diabetes mellitus without complication, without long-term current use of insulin ([facility])  Diet controlled. We will continue to monitor. Lab Results   Component Value Date    HGBA1C 6.8 (H) 11/02/2023       Essential hypertension  Controlled. Continue losartan 50 mg daily and hydrochlorothiazide 25 mg daily. Osteoporosis  On Prolia through rheumatology. Continue also on calcium and vitamin D. Due for next DEXA in October 2024. Hypokalemia  Slightly low on most recent labs at 3.4. We will continue current KCl 10 mill equivalents daily and monitor. Obesity (BMI 30.0-34. 9)  BMI Counseling: Body mass index is 31.05 kg/m². The BMI is above normal. Nutrition recommendations include 3-5 servings of fruits/vegetables daily. Exercise recommendations include exercising 3-5 times per week. Chief Complaint     Chief Complaint   Patient presents with    Follow-up     6 month follow up       History of Present Illness   Gabby Husain is a 69y.o.-year-old female who presents for follow-up on chronic conditions. DM 2-diet controlled-A1c is 6.8% this month - notes that she has been increased with carb intake    Hypertension-on losartan 50 mg daily and HCTZ 25 mg daily - sees /60 approximately     Osteoporosis-continues Prolia through rheumatology plus calcium and vitamin D supplementation-due for next DEXA scan in October 2024    Hypokalemia-on potassium chloride 10 mill equivalents daily-level this month is 3.4    Patient notes that she fell last week - legs got weak - fell in the grass - had a similar episode - notes that she did PT and does the exercises at home -           Review of Systems   Review of Systems   Constitutional:  Negative for chills and fever. HENT:  Negative for congestion, rhinorrhea and sore throat. Eyes:  Negative for visual disturbance. Respiratory:  Negative for cough, shortness of breath and wheezing.     Cardiovascular:  Positive for leg swelling (but started compression stockings and is helping). Negative for chest pain and palpitations. Gastrointestinal:  Negative for abdominal pain, constipation, diarrhea, nausea and vomiting. Endocrine: Negative for polydipsia and polyuria. Genitourinary:  Negative for dysuria and frequency. Musculoskeletal:  Negative for arthralgias and myalgias. Skin:  Negative for rash. Neurological:  Negative for dizziness, syncope and headaches. Hematological:  Does not bruise/bleed easily. Psychiatric/Behavioral:  Negative for dysphoric mood. The patient is not nervous/anxious. Active Problem List     Patient Active Problem List   Diagnosis    Dietary calcium deficiency    Obesity (BMI 30.0-34.9)    Osteoporosis    Hx of colonic polyps    Heart murmur    Type 2 diabetes mellitus without complication, without long-term current use of insulin (HCC)    Thrombocytopenia (HCC)    Essential hypertension    Hypokalemia    Low hematocrit    Leg swelling    Primary generalized (osteo)arthritis    Lumbar spondylosis    Acute left-sided low back pain without sciatica    Acute pain of right knee         Past Medical History:  Past Medical History:   Diagnosis Date    Chicken pox     Diabetes mellitus ([address])     Fall 05/01/2019    Hypertension     Kidney stones     Malignant neoplasm of skin     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    Osteoarthritis     Osteoporosis        Past Surgical History:  Past Surgical History:   Procedure Laterality Date    COLONOSCOPY  2018    COLONOSCOPY  2015    COMPLETE; DUE 3 YRS    COLONOSCOPY  06/20/2018    polyp x1    DILATION AND CURETTAGE OF UTERUS  08/2013    THICKENED ENDO ON USG, CVX STENOSIS, PATH: BENIGN POLYP FRAGMENTS AND INACTIVE ENDOMETRIUM.  NO NEOPLASIA ; IMPRESSION: 10/27/14; RISA MORRIS    HYSTEROSCOPY      MOHS SURGERY  02/19/2020    BCC on face    TONSILLECTOMY         Family History:  Family History   Problem Relation Age of Onset    Kidney cancer Mother 79 RENAL CANCER    Hypertension Mother     Skin cancer Mother     Osteoporosis Mother     Osteoporotic fracture Mother         hip - age 80     Osteoporotic fracture Paternal Grandmother        Social History:  Social History     Socioeconomic History    Marital status: Single     Spouse name: Not on file    Number of children: Not on file    Years of education: Not on file    Highest education level: Not on file   Occupational History    Occupation: [employer]   Tobacco Use    Smoking status: Former     Packs/day: 0.50     Years: 5.00     Total pack years: 2.50     Types: Cigarettes     Quit date: 1970     Years since quittin.9    Smokeless tobacco: Never    Tobacco comments:     STARTED AT AGE 17. STOPPED AT AGE 26.    Vaping Use    Vaping Use: Never used   Substance and Sexual Activity    Alcohol use: Yes     Comment: Wine for Special Occassion    Drug use: Never    Sexual activity: Not on file   Other Topics Concern    Not on file   Social History Narrative    EXERCISES MODERATELY LESS THAN 3 TIMES WEEK     Social Determinants of Health     Financial Resource Strain: Low Risk  (5/15/2023)    Overall Financial Resource Strain (CARDIA)     Difficulty of Paying Living Expenses: Not hard at all   Food Insecurity: Not on file   Transportation Needs: No Transportation Needs (5/15/2023)    PRAPARE - Transportation     Lack of Transportation (Medical): No     Lack of Transportation (Non-Medical):  No   Physical Activity: Not on file   Stress: Not on file   Social Connections: Not on file   Intimate Partner Violence: Not on file   Housing Stability: Not on file       Objective     Vitals:    11/15/23 1356   BP: 120/66   BP Location: Left arm   Patient Position: Sitting   Cuff Size: Large   Pulse: 81   Resp: 16   Temp: 98.2 °F (36.8 °C)   TempSrc: Temporal   SpO2: 99%   Weight: 72.1 kg (159 lb)   Height: 5' (1.524 m)     Wt Readings from Last 3 Encounters:   11/15/23 72.1 kg (159 lb)   11/15/23 73 kg (161 lb) 06/13/23 71.7 kg (158 lb)       Physical Exam  Vitals reviewed. Constitutional:       General: She is not in acute distress. Appearance: Normal appearance. She is well-developed. She is obese. She is not ill-appearing. HENT:      Head: Normocephalic and atraumatic. Neck:      Thyroid: No thyromegaly. Cardiovascular:      Rate and Rhythm: Normal rate and regular rhythm. Pulses: Normal pulses. Heart sounds: Murmur heard. Systolic murmur is present with a grade of 3/6. Comments: No carotid bruits noted  Pulmonary:      Effort: Pulmonary effort is normal.      Breath sounds: Normal breath sounds. No wheezing, rhonchi or rales. Musculoskeletal:      Cervical back: Neck supple. Right lower leg: Edema (1+) present. Left lower leg: Edema (1+) present. Lymphadenopathy:      Cervical: No cervical adenopathy. Neurological:      Mental Status: She is alert. Psychiatric:         Mood and Affect: Mood normal.         Behavior: Behavior normal.         Thought Content:  Thought content normal.         Judgment: Judgment normal.         Pertinent Laboratory/Diagnostic Studies:  Lab Results   Component Value Date    BUN 15 11/02/2023    CREATININE 0.78 11/02/2023    CALCIUM 10.1 11/02/2023    K 3.4 (L) 11/02/2023    CO2 27 11/02/2023     11/02/2023     Lab Results   Component Value Date    ALT 18 11/02/2023    AST 39 (H) 11/02/2023    ALKPHOS 86 11/02/2023       Lab Results   Component Value Date    WBC 4.6 04/26/2023    HGB 12.6 04/26/2023    HCT 35.6 04/26/2023    .3 (H) 04/26/2023     (L) 04/26/2023     Lab Results   Component Value Date    TRIG 78 04/26/2023     Lab Results   Component Value Date    HDL 65 04/26/2023     Lab Results   Component Value Date    LDLCALC 84 04/26/2023     Lab Results   Component Value Date    HGBA1C 6.8 (H) 11/02/2023       Results for orders placed or performed in visit on 11/15/23   Albumin / creatinine urine ratio   Result Value Ref Range    Creatinine, Ur 75.6 Reference range not established. mg/dL    Albumin,U,Random 10.9 <20.0 mg/L    Albumin Creat Ratio 14 0 - 30 mg/g creatinine           ALLERGIES:  No Known Allergies    Current Medications     Current Outpatient Medications   Medication Sig Dispense Refill    Calcium Carbonate-Vitamin D 600-200 MG-UNIT TABS Take 1 tablet by mouth 2 (two) times a day      Cholecalciferol (VITAMIN D3) 1000 units CAPS Take by mouth daily        denosumab (PROLIA) 60 mg/mL Inject under the skin every 6 (six) months        hydrochlorothiazide (HYDRODIURIL) 25 mg tablet Take 1 tablet (25 mg total) by mouth daily 90 tablet 1    losartan (COZAAR) 50 mg tablet Take 1 tablet (50 mg total) by mouth daily 90 tablet 1    Multiple Vitamins-Minerals (DAILY MULTI PO) Take by mouth daily        potassium chloride (K-DUR,KLOR-CON) 10 mEq tablet Take 1 tablet (10 mEq total) by mouth daily 90 tablet 1    chlorhexidine (PERIDEX) 0.12 % solution RINSE MOUTH WITH 15ML (1 CAPFUL) 2 TIMES A DAY AFTER MEALS. SWISH. ..  (REFER TO PRESCRIPTION NOTES). (Patient not taking: Reported on 11/15/2023)      ibuprofen (MOTRIN) 600 mg tablet take 1 tablet by mouth 6 hours for pain with food (Patient not taking: Reported on 11/15/2023)       No current facility-administered medications for this visit.          Health Maintenance     Health Maintenance   Topic Date Due    PT PLAN OF CARE  04/06/2023    Depression Screening  05/15/2024    COVID-19 Vaccine (4 - [place] series) 02/15/2024 (Originally 2/28/2022)    Diabetic Foot Exam  03/07/2024    HEMOGLOBIN A1C  05/02/2024    Fall Risk  05/15/2024    Urinary Incontinence Screening  05/15/2024    Medicare Annual Wellness Visit (AWV)  05/15/2024    Breast Cancer Screening: Mammogram  07/13/2024    Colorectal Cancer Screening  07/19/2024    Kidney Health Evaluation: GFR  11/02/2024    BMI: Adult  11/15/2024    Kidney Health Evaluation: Albumin/Creatinine Ratio  11/15/2024    BMI: Followup Plan  11/24/2024    DM Eye Exam  12/09/2024    Hepatitis C Screening  Completed    Osteoporosis Screening  Completed    Pneumococcal Vaccine: 65+ Years  Completed    Influenza Vaccine  Completed    HIB Vaccine  Aged Out    IPV Vaccine  Aged Out    Hepatitis A Vaccine  Aged Out    Meningococcal ACWY Vaccine  Aged Out    HPV Vaccine  Aged Out     Immunization History   Administered Date(s) Administered    COVID-19 MODERNA VACC 0.5 ML IM 02/06/2021, 03/09/2021, 01/03/2022    INFLUENZA 10/24/2007, 10/23/2017, 11/14/2022    Influenza Split High Dose Preservative Free IM 10/24/2018    Influenza, high dose seasonal 0.7 mL 09/26/2019, 09/24/2021, 11/14/2022, 11/15/2023    Influenza, seasonal, injectable 10/24/2007    Pneumococcal Conjugate 13-Valent 10/23/2017    Pneumococcal Polysaccharide PPV23 10/24/2018    Tdap 04/30/2019    Zoster 06/17/2016       Mercy Kam PA-C  11/24/2023 3:25 AM  [facility]

## 2023-11-15 NOTE — PATIENT INSTRUCTIONS
Continue 1,200mg of calcium daily  Continue 1,000 units of Vit.  D daily  Continue Prolia injections every 6 months  Do lab before next Prolia injection    Return to clinic in 6 months for Prolia only injection  Return to clinic in 1 year for Prolia + provider visit

## 2023-11-20 NOTE — PROGRESS NOTES
Assessment and Plan:   Michelle Bang is a 69 y.o.  female who presents for follow-up of her osteoporosis (osteopenia with high FRAX score). Has been tolerating Prolia injections every 6 months well. Received her 19th injection today in clinic. Has had a couple of falls recently without any fractures, so it seems medication is protecting her from fractures. Continue 1,200mg of calcium daily  Continue 1,000 units of Vit. D daily  Continue Prolia injections every 6 months  Do lab before next Prolia injection    Return to clinic in 6 months for Prolia only injection  Return to clinic in 1 year for Prolia + provider visit    Plan:  Diagnoses and all orders for this visit:    Age-related osteoporosis without current pathological fracture  -     denosumab (PROLIA) subcutaneous injection 60 mg    Vitamin D deficiency  -     Vitamin D 25 hydroxy    High risk medication use    Other orders  -     chlorhexidine (PERIDEX) 0.12 % solution; RINSE MOUTH WITH 15ML (1 CAPFUL) 2 TIMES A DAY AFTER MEALS. SWISH. ..  (REFER TO PRESCRIPTION NOTES). (Patient not taking: Reported on 11/15/2023)  -     ibuprofen (MOTRIN) 600 mg tablet; take 1 tablet by mouth 6 hours for pain with food (Patient not taking: Reported on 11/15/2023)    High risk medication use - Prolia (denosumab) is a monoclonal antibody biologic medication that can rapidly utilize the body's calcium and make one susceptible to hypocalcemia; the medication also has a risk of osteonecrosis of the jaw in patients with exposed jaw bone. Patient does not plan on getting any invasive dental procedures in the near future. Follow-up plan:   Return to clinic in 6 months for Prolia only injection  Return to clinic in 1 year for Prolia + provider visit        Rheumatic Disease Summary  Initial visit 11/15/22: Patient presents as a Rheumatology evaluation for osteoporosis; wants to transfer care to [facility], since office is closer.  Received Prolia this past Thursday with Dr. Desmond Kent, was her 17th shot. Was on Reclast and Fosamax in the past; Fosamax was hard on her stomach. Broke 3 ribs in the past when she fell over her dog. Continue 1,200mg of calcium daily  Continue 1,000 units of Vit. D daily  Continue Prolia injections every 6 months  Do labs before next Prolia injection  Return to clinic on 5/11/23 at 1pm for Prolia only injection visit  Return to clinic in 1 year for Prolia + provider visit    5/11/23: Prolia injection only    HPI  Karla Lindsay is a 69 y.o.  female who presents for follow-up of her osteoporosis. Has received 18 Prolia injections so far. Presents for her 19th injection today. She uses a cane to ambulate. Has had 2 falls in the past year, one on cement and one on grass, with no fractures. Review of Systems  Review of Systems   Constitutional:  Negative for fatigue. HENT:  Negative for mouth sores. Respiratory:  Negative for cough and shortness of breath. Cardiovascular:  Negative for chest pain and leg swelling. Gastrointestinal:  Negative for abdominal pain, constipation and diarrhea. Musculoskeletal:  Negative for arthralgias, back pain, joint swelling and myalgias. Skin:  Negative for color change and rash. Neurological:  Negative for weakness. Hematological:  Negative for adenopathy. Psychiatric/Behavioral:  Negative for sleep disturbance. Reviewed and agree.     Allergies  No Known Allergies    Home Medications    Current Outpatient Medications:     Calcium Carbonate-Vitamin D 600-200 MG-UNIT TABS, Take 1 tablet by mouth 2 (two) times a day, Disp: , Rfl:     Cholecalciferol (VITAMIN D3) 1000 units CAPS, Take by mouth daily  , Disp: , Rfl:     denosumab (PROLIA) 60 mg/mL, Inject under the skin every 6 (six) months  , Disp: , Rfl:     Multiple Vitamins-Minerals (DAILY MULTI PO), Take by mouth daily  , Disp: , Rfl:     chlorhexidine (PERIDEX) 0.12 % solution, RINSE MOUTH WITH 15ML (1 CAPFUL) 2 TIMES A DAY AFTER MEALS. SWISH. ..  (REFER TO PRESCRIPTION NOTES). (Patient not taking: Reported on 11/15/2023), Disp: , Rfl:     hydrochlorothiazide (HYDRODIURIL) 25 mg tablet, Take 1 tablet (25 mg total) by mouth daily, Disp: 90 tablet, Rfl: 1    ibuprofen (MOTRIN) 600 mg tablet, take 1 tablet by mouth 6 hours for pain with food (Patient not taking: Reported on 11/15/2023), Disp: , Rfl:     losartan (COZAAR) 50 mg tablet, Take 1 tablet (50 mg total) by mouth daily, Disp: 90 tablet, Rfl: 1    potassium chloride (K-DUR,KLOR-CON) 10 mEq tablet, Take 1 tablet (10 mEq total) by mouth daily, Disp: 90 tablet, Rfl: 1    Past Medical History  Past Medical History:   Diagnosis Date    Chicken pox     Diabetes mellitus ([address])     Fall 05/01/2019    Hypertension     Kidney stones     Malignant neoplasm of skin     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    Osteoarthritis     Osteoporosis        Past Surgical History   Past Surgical History:   Procedure Laterality Date    COLONOSCOPY  2018    COLONOSCOPY  2015    COMPLETE; DUE 3 YRS    COLONOSCOPY  06/20/2018    polyp x1    DILATION AND CURETTAGE OF UTERUS  08/2013    THICKENED ENDO ON USG, CVX STENOSIS, PATH: BENIGN POLYP FRAGMENTS AND INACTIVE ENDOMETRIUM.  NO NEOPLASIA ; IMPRESSION: 10/27/14; [address]    HYSTEROSCOPY      MOHS SURGERY  02/19/2020    BCC on face    TONSILLECTOMY         Family History    Family History   Problem Relation Age of Onset    Kidney cancer Mother 79        RENAL CANCER    Hypertension Mother     Skin cancer Mother     Osteoporosis Mother     Osteoporotic fracture Mother         hip - age 80     Osteoporotic fracture Paternal Grandmother          Social History  Social History     Substance and Sexual Activity   Alcohol Use Yes    Comment: Wine for Special Occassion     Social History     Substance and Sexual Activity   Drug Use Never     Social History     Tobacco Use   Smoking Status Former    Packs/day: 0.50    Years: 5.00    Total pack years: 2.50    Types: Cigarettes Quit date: 1970    Years since quittin.9   Smokeless Tobacco Never   Tobacco Comments    STARTED AT AGE 17. STOPPED AT AGE 23.        Objective:  Vitals:    11/15/23 1130   BP: 112/64   Pulse: 81   SpO2: 99%   Weight: 73 kg (161 lb)   Height: 5' (1.524 m)       Physical Exam  Constitutional:       General: She is not in acute distress. HENT:      Head: Normocephalic and atraumatic. Eyes:      Conjunctiva/sclera: Conjunctivae normal.   Cardiovascular:      Rate and Rhythm: Normal rate and regular rhythm. Heart sounds: S1 normal and S2 normal.      No friction rub. Pulmonary:      Effort: Pulmonary effort is normal. No respiratory distress. Breath sounds: Normal breath sounds. No wheezing, rhonchi or rales. Musculoskeletal:         General: Swelling and tenderness present. Cervical back: Neck supple. Comments: Right ankle swelling/tenderness; ambulating with cane   Skin:     General: Skin is warm and dry. Coloration: Skin is not pale. Findings: No rash. Neurological:      Mental Status: She is alert. Mental status is at baseline. Psychiatric:         Mood and Affect: Mood normal.         Behavior: Behavior normal.       Reviewed labs and imaging. Imaging:   DXA scan 10/14/22  RESULTS:   LUMBAR SPINE:  L1-L4:  BMD 0.991 gm/cm2  T-score -0.5 and on the prior study the T score was -0.5. Z-score 2.0     LEFT TOTAL HIP:  BMD 0.848 gm/cm2  T-score -0.8 and on the prior study the T score was -0.8. Z-score 1.1     LEFT FEMORAL NECK:  BMD 0.659 gm/cm2  T-score -1.7 and on the prior study the T score was -1.8.  Z-score 0.4     IMPRESSION:  1. Based on the [facility] classification, this study identifies a diagnosis of low bone mass, notable at the femoral neck area but the patient may be considered at risk for fracture as evidenced by the increased hip fracture risk noted on the Frax score.     XR lumbar spine 21  Multilevel degenerative changes with exaggerated lordosis. XR left hip 9/24/21  Mild left hip osteoarthritis is seen. DXA scan 10/5/20  Impression:   The examination is reviewed using the World Health Organization criteria. There is osteopenia as measured by the bone mineral density of the femoral neck. 1. The lumbar spine has bone mineral density that falls within the normal range. 2. The hip has bone mineral density of osteopenia with measurements that show   increased risk for fracture. 3. A FRAX is not reported because the patient is being treated for osteoporosis. Labs:   Office Visit on 11/15/2023   Component Date Value Ref Range Status    Creatinine, Ur 11/15/2023 75.6  Reference range not established. mg/dL Final    Albumin,U,Random 11/15/2023 10.9  <20.0 mg/L Final    Albumin Creat Ratio 11/15/2023 14  0 - 30 mg/g creatinine Final   Orders Only on 11/02/2023   Component Date Value Ref Range Status    Glucose, Random 11/02/2023 121 (H)  65 - 99 mg/dL Final    Comment:               Fasting reference interval     For someone without known diabetes, a glucose value  between 100 and 125 mg/dL is consistent with  prediabetes and should be confirmed with a  follow-up test.         BUN 11/02/2023 15  7 - 25 mg/dL Final    Creatinine 11/02/2023 0.78  0.60 - 1.00 mg/dL Final    eGFR 11/02/2023 78  > OR = 60 mL/min/1.73m2 Final    SL AMB BUN/CREATININE RATIO 11/02/2023 SEE NOTE:  6 - 22 (calc) Final    Comment:    Not Reported: BUN and Creatinine are within     reference range.             Sodium 11/02/2023 139  135 - 146 mmol/L Final    Potassium 11/02/2023 3.4 (L)  3.5 - 5.3 mmol/L Final    Chloride 11/02/2023 104  98 - 110 mmol/L Final    CO2 11/02/2023 27  20 - 32 mmol/L Final    Calcium 11/02/2023 10.1  8.6 - 10.4 mg/dL Final    Protein, Total 11/02/2023 6.6  6.1 - 8.1 g/dL Final    Albumin 11/02/2023 3.4 (L)  3.6 - 5.1 g/dL Final    Globulin 11/02/2023 3.2  1.9 - 3.7 g/dL (calc) Final    Albumin/Globulin Ratio 11/02/2023 1.1  1.0 - 2.5 (calc) Final    TOTAL BILIRUBIN 11/02/2023 1.9 (H)  0.2 - 1.2 mg/dL Final    Alkaline Phosphatase 11/02/2023 86  37 - 153 U/L Final    AST 11/02/2023 39 (H)  10 - 35 U/L Final    ALT 11/02/2023 18  6 - 29 U/L Final    Hemoglobin A1C 11/02/2023 6.8 (H)  <5.7 % of total Hgb Final    Comment: For someone without known diabetes, a hemoglobin A1c  value of 6.5% or greater indicates that they may have   diabetes and this should be confirmed with a follow-up   test.     For someone with known diabetes, a value <7% indicates   that their diabetes is well controlled and a value   greater than or equal to 7% indicates suboptimal   control. A1c targets should be individualized based on   duration of diabetes, age, comorbid conditions, and   other considerations. Currently, no consensus exists regarding use of  hemoglobin A1c for diagnosis of diabetes for children.          Hospital Outpatient Visit on 06/13/2023   Component Date Value Ref Range Status    AV area peak hollis 06/13/2023 2.1  cm2 Final    LA size 06/13/2023 3.2  cm Final    Aortic valve mean velocity 06/13/2023 11.40  m/s Final    Triscuspid Valve Regurgitation Pea* 06/13/2023 27.0  mmHg Final    Tricuspid valve peak regurgitation* 06/13/2023 2.58  m/s Final    Ao STJ 06/13/2023 2.60  cm Final    LVPWd 06/13/2023 0.80  cm Final    Left Atrium Area-systolic Apical T* 80/95/6355 24  cm2 Final    Left Atrium Area-systolic Four Judith* 44/61/1233 18.2  cm2 Final    MV E' Tissue Velocity Septal 06/13/2023 12  cm/s Final    MV E' Tissue Velocity Lateral 06/13/2023 14  cm/s Final    Tricuspid annular plane systolic e* 51/45/0153 2.00  cm Final    TR Peak Hollis 06/13/2023 2.6  m/s Final    IVSd 06/13/2023 0.90  cm Final    LV DIASTOLIC VOLUME (MOD BIPLANE) * 06/13/2023 82  mL Final    LEFT VENTRICLE SYSTOLIC VOLUME (MO* 76/78/6676 27  mL Final    Left ventricular stroke volume (2D) 06/13/2023 55.00  mL Final    A4C EF 06/13/2023 62  % Final    LA length (A2C) 06/13/2023 5.70 cm Final    Sinus of Valsalva, 2D 06/13/2023 3.1  cm Final    LVIDd 06/13/2023 4.30  cm Final    IVS 06/13/2023 0.9  cm Final    LVIDS 06/13/2023 2.70  cm Final    FS 06/13/2023 37  28 - 44 % Final    STJ 06/13/2023 2.6  cm Final    Asc Ao 06/13/2023 3.3  cm Final    Ao root 06/13/2023 3.00  cm Final    RVID d 06/13/2023 3.7  cm Final    LVOT mn grad 06/13/2023 2.0  mmHg Final    AV area by cont VTI 06/13/2023 2.3  cm2 Final    AV mean gradient 06/13/2023 6  mmHg Final    AV LVOT peak gradient 06/13/2023 5  mmHg Final    MV valve area p 1/2 method 06/13/2023 2.56  cm2 Final    E wave deceleration time 06/13/2023 295  ms Final    LVOT diameter 06/13/2023 2.0  cm Final    LVOT peak hollis 06/13/2023 1.1  m/s Final    LVOT peak VTI 06/13/2023 22.02  cm Final    Aortic valve peak velocity 06/13/2023 1.66  m/s Final    Ao VTI 06/13/2023 30.71  cm Final    LVOT stroke volume 06/13/2023 69.14  cm3 Final    AV peak gradient 06/13/2023 11  mmHg Final    MV Peak E Hollis 06/13/2023 66  cm/s Final    MV Peak A Hollis 06/13/2023 1.02  m/s Final    RAA A4C 06/13/2023 14.8  cm2 Final    MV stenosis pressure 1/2 time 06/13/2023 86  ms Final    LVOT stroke volume index 06/13/2023 42.30  ml/m2 Final    LVSV, 2D 06/13/2023 55  mL Final    LVOT area 06/13/2023 3.14  cm2 Final    DVI 06/13/2023 0.66   Final    AV valve area 06/13/2023 2.25  cm2 Final    LV EF 06/13/2023 67   Final   Telephone on 05/16/2023   Component Date Value Ref Range Status    Severity 12/09/2022 Normal   Final    Right Eye Diabetic Retinopathy 12/09/2022 None   Final    Left Eye Diabetic Retinopathy 12/09/2022 None   Final

## 2023-11-24 NOTE — ASSESSMENT & PLAN NOTE
On Prolia through rheumatology. Continue also on calcium and vitamin D. Due for next DEXA in October 2024.

## 2023-11-24 NOTE — ASSESSMENT & PLAN NOTE
Slightly low on most recent labs at 3.4. We will continue current KCl 10 mill equivalents daily and monitor.

## 2023-11-24 NOTE — ASSESSMENT & PLAN NOTE
BMI Counseling: Body mass index is 31.05 kg/m². The BMI is above normal. Nutrition recommendations include 3-5 servings of fruits/vegetables daily. Exercise recommendations include exercising 3-5 times per week.

## 2023-11-24 NOTE — ASSESSMENT & PLAN NOTE
Diet controlled. We will continue to monitor.   Lab Results   Component Value Date    HGBA1C 6.8 (H) 11/02/2023

## 2024-01-31 ENCOUNTER — CONSULT (OUTPATIENT)
Dept: FAMILY MEDICINE CLINIC | Facility: CLINIC | Age: 79
End: 2024-01-31

## 2024-01-31 VITALS
SYSTOLIC BLOOD PRESSURE: 120 MMHG | TEMPERATURE: 98.1 F | WEIGHT: 158 LBS | DIASTOLIC BLOOD PRESSURE: 76 MMHG | OXYGEN SATURATION: 96 % | BODY MASS INDEX: 31.02 KG/M2 | HEIGHT: 60 IN | HEART RATE: 77 BPM

## 2024-01-31 DIAGNOSIS — I10 ESSENTIAL HYPERTENSION: ICD-10-CM

## 2024-01-31 DIAGNOSIS — E11.9 TYPE 2 DIABETES MELLITUS WITHOUT COMPLICATION, WITHOUT LONG-TERM CURRENT USE OF INSULIN (HCC): ICD-10-CM

## 2024-01-31 DIAGNOSIS — H26.9 CATARACT OF RIGHT EYE, UNSPECIFIED CATARACT TYPE: ICD-10-CM

## 2024-01-31 DIAGNOSIS — Z01.818 PRE-OP EXAMINATION: Primary | ICD-10-CM

## 2024-01-31 LAB — SL AMB POCT HEMOGLOBIN AIC: 7.7 (ref ?–6.5)

## 2024-01-31 RX ORDER — KETOROLAC TROMETHAMINE 5 MG/ML
SOLUTION OPHTHALMIC
COMMUNITY
Start: 2024-01-23

## 2024-01-31 RX ORDER — OFLOXACIN 3 MG/ML
SOLUTION/ DROPS OPHTHALMIC
COMMUNITY
Start: 2024-01-23

## 2024-01-31 RX ORDER — PREDNISOLONE ACETATE 10 MG/ML
SUSPENSION/ DROPS OPHTHALMIC
COMMUNITY
Start: 2024-01-23

## 2024-01-31 NOTE — PROGRESS NOTES
FAMILY PRACTICE OFFICE VISIT  [facility] - [facility]       NAME: Violeta Hollis  AGE: 79 y.o. SEX: female       : 1945        MRN: [id number]    DATE: 2024  TIME: 12:52 AM    Assessment and Plan     Problem List Items Addressed This Visit          Endocrine    Type 2 diabetes mellitus without complication, without long-term current use of insulin (HCC)     Less controlled than previously.  Patient will work on improving her diet and that the holidays are over.  Will recheck at next visit.  Lab Results   Component Value Date    HGBA1C 7.7 (A) 2024            Relevant Orders    POCT hemoglobin A1c (Completed)       Cardiovascular and Mediastinum    Essential hypertension     Well-controlled.  Continue current regimen.          Other Visit Diagnoses       Pre-op examination    -  Primary    Cataract of right eye, unspecified cataract type        To be corrected with upcoming procedure as noted below.    Relevant Medications    prednisoLONE acetate (PRED FORTE) 1 % ophthalmic suspension    ofloxacin (OCUFLOX) 0.3 % ophthalmic solution    ketorolac (ACULAR) 0.5 % ophthalmic solution               Surgical clearance:  The patient is found to be at acceptable risk from a cardiovascular standpoint.  Additional cardiac testing is not necessary.  Re-evaluation is needed if the patient should present with symptoms prior to surgery/ procedure.  Surgical clearance will be faxed to Dr. Wright.        Chief Complaint     Chief Complaint   Patient presents with    Pre-op Exam       History of Present Illness   Violeta Hollis is a 79 y.o.-year-old female who presents for preop clearance.     Pre-Op Visit:  The patient is being seen today for preoperative visit.  The procedure that is scheduled is cataract extraction.  It is scheduled on 2/15/24  with Dr. Wright.   The indication for the surgery is decreased visual acuity.    Surgical Risk Assessment:  Prior Anesthesia:   The patient confirms prior anesthesia and denies problems with anesthesia.  Pertinent Past Medical History:  The patient confirms history of  diabetes (A1c today 7.7%) and denies  angina,  CAD,  arrhythmia,  DVT,  pulmonary embolism,  chronic liver disease,  chronic kidney disease,  COPD,  asthma,  thyroid disease, and  seizure disorder.   Exercise Capacity:  The patient confirms ability to walk 4 blocks without symptoms (but notes that she does not walk outside anymore due to falls) and confirms ability to walk two flights of stairs without symptoms.   Lifestyle Factors:  The patient denies alcohol use, denies tobacco use, and denies drug use.   Symptoms:  The patient denies easy bruising,  chest pain,  cough,  dyspnea,  edema,  palpitations, and  wheezing.   Pertinent Family History:  The patient denies family history of  ischemic heart diease,  adverse reaction to anesthesia,  aneurysm,  bleeding problems,  sudden early death, and  stroke.   Living Situation:  The patient's reports home is secure and denies any post-op concerns with current living situation.            Review of Systems   Review of Systems   Constitutional:  Negative for chills and fever.   HENT:  Negative for congestion, rhinorrhea and sore throat.    Respiratory:  Negative for cough, shortness of breath and wheezing.    Cardiovascular:  Negative for chest pain, palpitations and leg swelling.   Gastrointestinal:  Negative for abdominal pain, blood in stool, constipation, diarrhea, nausea and vomiting.   Genitourinary:  Negative for dysuria and frequency.   Skin:  Negative for rash.   Neurological:  Negative for dizziness, syncope and headaches.   Hematological:  Does not bruise/bleed easily.       Active Problem List     Patient Active Problem List   Diagnosis    Dietary calcium deficiency    Obesity (BMI 30.0-34.9)    Osteoporosis    Hx of colonic polyps    Heart murmur    Type 2 diabetes mellitus without complication, without long-term current  use of insulin (HCC)    Thrombocytopenia (HCC)    Essential hypertension    Hypokalemia    Low hematocrit    Leg swelling    Primary generalized (osteo)arthritis    Lumbar spondylosis    Acute left-sided low back pain without sciatica    Acute pain of right knee         Past Medical History:  Past Medical History:   Diagnosis Date    Chicken pox     Diabetes mellitus (HCC)     Fall 2019    Hypertension     Kidney stones     Malignant neoplasm of skin     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    Osteoarthritis     Osteoporosis        Past Surgical History:  Past Surgical History:   Procedure Laterality Date    COLONOSCOPY      COLONOSCOPY      COMPLETE; DUE 3 YRS    COLONOSCOPY  2018    polyp x1    DILATION AND CURETTAGE OF UTERUS  2013    THICKENED ENDO ON USG, CVX STENOSIS, PATH: BENIGN POLYP FRAGMENTS AND INACTIVE ENDOMETRIUM. NO NEOPLASIA ; IMPRESSION: 10/27/14; RISA MORRIS    HYSTEROSCOPY      MOHS SURGERY  2020    BCC on face    TONSILLECTOMY         Family History:  Family History   Problem Relation Age of Onset    Kidney cancer Mother 70        RENAL CANCER    Hypertension Mother     Skin cancer Mother     Osteoporosis Mother     Osteoporotic fracture Mother         hip - age 92     Osteoporotic fracture Paternal Grandmother        Social History:  Social History     Socioeconomic History    Marital status: Single     Spouse name: Not on file    Number of children: Not on file    Years of education: Not on file    Highest education level: Not on file   Occupational History    Occupation: [employer]   Tobacco Use    Smoking status: Former     Current packs/day: 0.00     Average packs/day: 0.5 packs/day for 5.0 years (2.5 ttl pk-yrs)     Types: Cigarettes     Start date: 1965     Quit date: 1970     Years since quittin.1     Passive exposure: Never    Smokeless tobacco: Never    Tobacco comments:     STARTED AT AGE 18. STOPPED AT AGE 23.    Vaping Use    Vaping status:  Never Used   Substance and Sexual Activity    Alcohol use: Not Currently     Comment: Wine for Special Occassion    Drug use: Never    Sexual activity: Not on file   Other Topics Concern    Not on file   Social History Narrative    EXERCISES MODERATELY LESS THAN 3 TIMES WEEK     Social Determinants of Health     Financial Resource Strain: Low Risk  (5/15/2023)    Overall Financial Resource Strain (CARDIA)     Difficulty of Paying Living Expenses: Not hard at all   Food Insecurity: Not on file   Transportation Needs: No Transportation Needs (5/15/2023)    PRAPARE - Transportation     Lack of Transportation (Medical): No     Lack of Transportation (Non-Medical): No   Physical Activity: Not on file   Stress: Not on file   Social Connections: Not on file   Intimate Partner Violence: Not on file   Housing Stability: Not on file       Objective     Vitals:    01/31/24 1114   BP: 120/76   BP Location: Left arm   Patient Position: Sitting   Cuff Size: Adult   Pulse: 77   Temp: 98.1 °F (36.7 °C)   TempSrc: Temporal   SpO2: 96%   Weight: 71.7 kg (158 lb)   Height: 5' (1.524 m)     Wt Readings from Last 3 Encounters:   01/31/24 71.7 kg (158 lb)   11/15/23 72.1 kg (159 lb)   11/15/23 73 kg (161 lb)       Physical Exam  Constitutional:       General: She is not in acute distress.     Appearance: Normal appearance. She is well-developed. She is obese. She is not ill-appearing.   HENT:      Head: Normocephalic and atraumatic.      Right Ear: Hearing, tympanic membrane, ear canal and external ear normal.      Left Ear: Hearing, tympanic membrane, ear canal and external ear normal.      Nose: Nose normal.   Eyes:      General: Lids are normal.      Conjunctiva/sclera: Conjunctivae normal.      Pupils: Pupils are equal, round, and reactive to light.   Neck:      Thyroid: No thyroid mass or thyromegaly.      Vascular: No carotid bruit.      Trachea: Trachea normal.   Cardiovascular:      Rate and Rhythm: Normal rate and regular  rhythm.      Pulses: Normal pulses.           Radial pulses are 2+ on the right side and 2+ on the left side.        Posterior tibial pulses are 2+ on the right side and 2+ on the left side.      Heart sounds: S1 normal and S2 normal. Murmur heard.      Systolic murmur is present with a grade of 3/6.   Pulmonary:      Effort: Pulmonary effort is normal.      Breath sounds: Normal breath sounds. No decreased breath sounds, wheezing, rhonchi or rales.   Abdominal:      General: Bowel sounds are normal. There is no distension.      Palpations: Abdomen is soft. There is no mass.      Tenderness: There is no abdominal tenderness.      Hernia: No hernia is present.   Musculoskeletal:         General: Normal range of motion.      Cervical back: Normal range of motion and neck supple.      Right lower leg: No edema.      Left lower leg: No edema.   Lymphadenopathy:      Cervical: No cervical adenopathy.   Skin:     General: Skin is warm and dry.      Findings: No rash.   Neurological:      Mental Status: She is alert.      Sensory: No sensory deficit (light touch sensation intact and equal in UE and LE bilaterally).   Psychiatric:         Mood and Affect: Mood normal.         Behavior: Behavior normal.         Thought Content: Thought content normal.         Judgment: Judgment normal.         Pertinent Laboratory/Diagnostic Studies:  Lab Results   Component Value Date    BUN 15 11/02/2023    CREATININE 0.78 11/02/2023    CALCIUM 10.1 11/02/2023    K 3.4 (L) 11/02/2023    CO2 27 11/02/2023     11/02/2023     Lab Results   Component Value Date    ALT 18 11/02/2023    AST 39 (H) 11/02/2023    ALKPHOS 86 11/02/2023       Lab Results   Component Value Date    WBC 4.6 04/26/2023    HGB 12.6 04/26/2023    HCT 35.6 04/26/2023    .3 (H) 04/26/2023     (L) 04/26/2023       Lab Results   Component Value Date    TRIG 78 04/26/2023     Lab Results   Component Value Date    HDL 65 04/26/2023     Lab Results    Component Value Date    LDLCALC 84 04/26/2023     Lab Results   Component Value Date    HGBA1C 7.7 (A) 01/31/2024       Results for orders placed or performed in visit on 01/31/24   POCT hemoglobin A1c   Result Value Ref Range    Hemoglobin A1C 7.7 (A) 6.5       Orders Placed This Encounter   Procedures    POCT hemoglobin A1c       ALLERGIES:  No Known Allergies    Current Medications     Current Outpatient Medications   Medication Sig Dispense Refill    Calcium Carbonate-Vitamin D 600-200 MG-UNIT TABS Take 1 tablet by mouth 2 (two) times a day      Cholecalciferol (VITAMIN D3) 1000 units CAPS Take by mouth daily        denosumab (PROLIA) 60 mg/mL Inject under the skin every 6 (six) months        hydrochlorothiazide (HYDRODIURIL) 25 mg tablet Take 1 tablet (25 mg total) by mouth daily 90 tablet 1    ketorolac (ACULAR) 0.5 % ophthalmic solution INSTILL 1 DROP INTO SURGERY EYE 4 TIMES A DAY START RIGHT EYE ON 2/12/2024      losartan (COZAAR) 50 mg tablet Take 1 tablet (50 mg total) by mouth daily 90 tablet 1    Multiple Vitamins-Minerals (DAILY MULTI PO) Take by mouth daily        ofloxacin (OCUFLOX) 0.3 % ophthalmic solution INSTILL 1 DROP INTO SURGERY EYE 4 TIMES A DAY START RIGHT EYE ON 2/12/2024      potassium chloride (K-DUR,KLOR-CON) 10 mEq tablet Take 1 tablet (10 mEq total) by mouth daily 90 tablet 1    prednisoLONE acetate (PRED FORTE) 1 % ophthalmic suspension Instill 1 drop IN SURGERY EYE four times a day (BEGINNING DAY OF SURGERY, BUT AFTER PROCEDURE)       No current facility-administered medications for this visit.         Health Maintenance     Health Maintenance   Topic Date Due    Zoster Vaccine (2 of 3) 08/12/2016    PT PLAN OF CARE  04/06/2023    Diabetic Foot Exam  03/06/2024    COVID-19 Vaccine (4 - 2023-24 season) 02/15/2024 (Originally 9/1/2023)    Fall Risk  05/15/2024    Urinary Incontinence Screening  05/15/2024    Medicare Annual Wellness Visit (AWV)  05/15/2024    Breast Cancer Screening:  Mammogram  07/13/2024    Colorectal Cancer Screening  07/19/2024    HEMOGLOBIN A1C  07/31/2024    Kidney Health Evaluation: GFR  11/02/2024    Kidney Health Evaluation: Albumin/Creatinine Ratio  11/15/2024    DM Eye Exam  12/09/2024    Depression Screening  01/31/2025    Hepatitis C Screening  Completed    Osteoporosis Screening  Completed    Pneumococcal Vaccine: 65+ Years  Completed    Influenza Vaccine  Completed    HIB Vaccine  Aged Out    IPV Vaccine  Aged Out    Hepatitis A Vaccine  Aged Out    Meningococcal ACWY Vaccine  Aged Out    HPV Vaccine  Aged Out     Immunization History   Administered Date(s) Administered    COVID-19 MODERNA VACC 0.5 ML IM 02/06/2021, 03/09/2021, 01/03/2022    INFLUENZA 10/24/2007, 10/23/2017, 11/14/2022    Influenza Split High Dose Preservative Free IM 10/24/2018    Influenza, high dose seasonal 0.7 mL 09/26/2019, 09/24/2021, 11/14/2022, 11/15/2023    Influenza, seasonal, injectable 10/24/2007    Pneumococcal Conjugate 13-Valent 10/23/2017    Pneumococcal Polysaccharide PPV23 10/24/2018    Tdap 04/30/2019    Zoster 06/17/2016       Venessa Kay PA-C  2/2/2024 12:52 AM  [facility]

## 2024-02-02 NOTE — ASSESSMENT & PLAN NOTE
Less controlled than previously.  Patient will work on improving her diet and that the holidays are over.  Will recheck at next visit.  Lab Results   Component Value Date    HGBA1C 7.7 (A) 01/31/2024

## 2024-02-07 ENCOUNTER — TELEPHONE (OUTPATIENT)
Dept: FAMILY MEDICINE CLINIC | Facility: CLINIC | Age: 79
End: 2024-02-07

## 2024-04-05 ENCOUNTER — TELEPHONE (OUTPATIENT)
Dept: FAMILY MEDICINE CLINIC | Facility: CLINIC | Age: 79
End: 2024-04-05

## 2024-04-05 NOTE — TELEPHONE ENCOUNTER
Received a fax through [employer] 4-3-24 / 4:06 PM from [facility] Pt has appt 6-14-24 @ 1:20 PM with Yonathan

## 2024-04-10 ENCOUNTER — TELEPHONE (OUTPATIENT)
Dept: FAMILY MEDICINE CLINIC | Facility: CLINIC | Age: 79
End: 2024-04-10

## 2024-04-10 DIAGNOSIS — I10 ESSENTIAL HYPERTENSION: ICD-10-CM

## 2024-04-10 NOTE — TELEPHONE ENCOUNTER
LVM for pt to call back to reschedule her May 17, 2024 2:00 PM appt due to Venessa not being in the office.

## 2024-04-10 NOTE — TELEPHONE ENCOUNTER
Last visit 01/31/2024  Next appt 06/14/2024    Patient is requesting a refill on 3 prescriptions. They are potassium chloride, hydrochlorothiazide and losartan.

## 2024-04-11 RX ORDER — LOSARTAN POTASSIUM 50 MG/1
50 TABLET ORAL DAILY
Qty: 90 TABLET | Refills: 1 | Status: SHIPPED | OUTPATIENT
Start: 2024-04-11

## 2024-04-11 RX ORDER — HYDROCHLOROTHIAZIDE 25 MG/1
25 TABLET ORAL DAILY
Qty: 90 TABLET | Refills: 1 | Status: SHIPPED | OUTPATIENT
Start: 2024-04-11

## 2024-04-11 RX ORDER — POTASSIUM CHLORIDE 750 MG/1
10 TABLET, EXTENDED RELEASE ORAL DAILY
Qty: 90 TABLET | Refills: 1 | Status: SHIPPED | OUTPATIENT
Start: 2024-04-11

## 2024-05-07 LAB — 25(OH)D3 SERPL-MCNC: 78 NG/ML (ref 30–100)

## 2024-05-15 ENCOUNTER — CLINICAL SUPPORT (OUTPATIENT)
Dept: RHEUMATOLOGY | Facility: CLINIC | Age: 79
End: 2024-05-15
Payer: MEDICARE

## 2024-05-15 DIAGNOSIS — M81.0 AGE-RELATED OSTEOPOROSIS WITHOUT CURRENT PATHOLOGICAL FRACTURE: Primary | ICD-10-CM

## 2024-05-15 PROCEDURE — 96372 THER/PROPH/DIAG INJ SC/IM: CPT

## 2024-05-15 NOTE — PROGRESS NOTES
Assessment/Plan:    Violeta Hollis came into the [facility] Office today 05/15/24 to receive Prolia injection.      Verbal consent obtained.  Consent given by: patient    patient states patient has been medically healthy with no underlining concerns/complications.      Violeta Hollis presents with no symptoms today.       All insturctions were reviewed with the patient.    If the patient should have any questions/concerns, advised patient to contacted [facility].       Subjective:     History provided by: patient    Patient ID: Violeta Hollis is a 79 y.o. female      Objective:    There were no vitals filed for this visit.    Patient tolerated the injection well without any complications.  Injection site/s left arm.  Medication was provided by provider stock.    Patient signed consent form no   Patient signed ABN form no (If no patient is not a medicare patient).   Patient waited 15 minutes after injection no (This only applies to patient's receiving first time injection).       Last Visit: Visit date not found  Next visit:Visit date not found

## 2024-05-24 ENCOUNTER — TELEPHONE (OUTPATIENT)
Age: 79
End: 2024-05-24

## 2024-05-24 NOTE — TELEPHONE ENCOUNTER
Patient called regarding Pre Op appointment on 6/14/24 and AMW on 6/19/24; wants to know if both appointments can be combined into one. Wants to come in on 6/19/24 for both pre op and AMW. Warm transfer to [facility]; patient advised message will be sent to Venessa; will call her back to let her know what Venessa decides.

## 2024-05-24 NOTE — TELEPHONE ENCOUNTER
5/4 1:47pm: Please advise if PT's appt for Pre-op Clearance and AWV can be combined into one visit, on 6/19/24.

## 2024-06-11 LAB
25(OH)D3 SERPL-MCNC: 69 NG/ML (ref 30–100)
ALBUMIN SERPL-MCNC: 3.4 G/DL (ref 3.6–5.1)
ALBUMIN/CREAT UR: 14 MG/G CREAT
ALBUMIN/GLOB SERPL: 1.1 (CALC) (ref 1–2.5)
ALP SERPL-CCNC: 76 U/L (ref 37–153)
ALT SERPL-CCNC: 17 U/L (ref 6–29)
AST SERPL-CCNC: 39 U/L (ref 10–35)
BASOPHILS # BLD AUTO: 31 CELLS/UL (ref 0–200)
BASOPHILS NFR BLD AUTO: 0.8 %
BILIRUB SERPL-MCNC: 2 MG/DL (ref 0.2–1.2)
BUN SERPL-MCNC: 19 MG/DL (ref 7–25)
BUN/CREAT SERPL: ABNORMAL (CALC) (ref 6–22)
CALCIUM SERPL-MCNC: 10.3 MG/DL (ref 8.6–10.4)
CHLORIDE SERPL-SCNC: 104 MMOL/L (ref 98–110)
CHOLEST SERPL-MCNC: 166 MG/DL
CHOLEST/HDLC SERPL: 2.5 (CALC)
CO2 SERPL-SCNC: 25 MMOL/L (ref 20–32)
CREAT SERPL-MCNC: 0.86 MG/DL (ref 0.6–1)
CREAT UR-MCNC: 145 MG/DL (ref 20–275)
EOSINOPHIL # BLD AUTO: 211 CELLS/UL (ref 15–500)
EOSINOPHIL NFR BLD AUTO: 5.4 %
ERYTHROCYTE [DISTWIDTH] IN BLOOD BY AUTOMATED COUNT: 12.7 % (ref 11–15)
GFR/BSA.PRED SERPLBLD CYS-BASED-ARV: 69 ML/MIN/1.73M2
GLOBULIN SER CALC-MCNC: 3.2 G/DL (CALC) (ref 1.9–3.7)
GLUCOSE SERPL-MCNC: 113 MG/DL (ref 65–99)
HBA1C MFR BLD: 7.1 % OF TOTAL HGB
HCT VFR BLD AUTO: 34.8 % (ref 35–45)
HDLC SERPL-MCNC: 67 MG/DL
HGB BLD-MCNC: 11.9 G/DL (ref 11.7–15.5)
LDLC SERPL CALC-MCNC: 85 MG/DL (CALC)
LYMPHOCYTES # BLD AUTO: 1392 CELLS/UL (ref 850–3900)
LYMPHOCYTES NFR BLD AUTO: 35.7 %
MCH RBC QN AUTO: 34.9 PG (ref 27–33)
MCHC RBC AUTO-ENTMCNC: 34.2 G/DL (ref 32–36)
MCV RBC AUTO: 102.1 FL (ref 80–100)
MICROALBUMIN UR-MCNC: 2.1 MG/DL
MONOCYTES # BLD AUTO: 289 CELLS/UL (ref 200–950)
MONOCYTES NFR BLD AUTO: 7.4 %
NEUTROPHILS # BLD AUTO: 1977 CELLS/UL (ref 1500–7800)
NEUTROPHILS NFR BLD AUTO: 50.7 %
NONHDLC SERPL-MCNC: 99 MG/DL (CALC)
PLATELET # BLD AUTO: 118 THOUSAND/UL (ref 140–400)
PMV BLD REES-ECKER: 10.6 FL (ref 7.5–12.5)
POTASSIUM SERPL-SCNC: 3.6 MMOL/L (ref 3.5–5.3)
PROT SERPL-MCNC: 6.6 G/DL (ref 6.1–8.1)
RBC # BLD AUTO: 3.41 MILLION/UL (ref 3.8–5.1)
SODIUM SERPL-SCNC: 137 MMOL/L (ref 135–146)
TRIGL SERPL-MCNC: 61 MG/DL
TSH SERPL-ACNC: 1.35 MIU/L (ref 0.4–4.5)
WBC # BLD AUTO: 3.9 THOUSAND/UL (ref 3.8–10.8)

## 2024-06-19 ENCOUNTER — APPOINTMENT (OUTPATIENT)
Dept: RADIOLOGY | Facility: MEDICAL CENTER | Age: 79
End: 2024-06-19
Payer: MEDICARE

## 2024-06-19 ENCOUNTER — OFFICE VISIT (OUTPATIENT)
Dept: FAMILY MEDICINE CLINIC | Facility: CLINIC | Age: 79
End: 2024-06-19
Payer: MEDICARE

## 2024-06-19 VITALS
HEART RATE: 86 BPM | BODY MASS INDEX: 32.25 KG/M2 | WEIGHT: 160 LBS | DIASTOLIC BLOOD PRESSURE: 78 MMHG | SYSTOLIC BLOOD PRESSURE: 120 MMHG | OXYGEN SATURATION: 98 % | HEIGHT: 59 IN

## 2024-06-19 DIAGNOSIS — M79.89 LEG SWELLING: ICD-10-CM

## 2024-06-19 DIAGNOSIS — Z86.010 PERSONAL HISTORY OF COLONIC POLYPS: ICD-10-CM

## 2024-06-19 DIAGNOSIS — I10 ESSENTIAL HYPERTENSION: ICD-10-CM

## 2024-06-19 DIAGNOSIS — G89.29 CHRONIC PAIN OF RIGHT KNEE: ICD-10-CM

## 2024-06-19 DIAGNOSIS — E87.6 HYPOKALEMIA: ICD-10-CM

## 2024-06-19 DIAGNOSIS — E11.9 TYPE 2 DIABETES MELLITUS WITHOUT COMPLICATION, WITHOUT LONG-TERM CURRENT USE OF INSULIN (HCC): ICD-10-CM

## 2024-06-19 DIAGNOSIS — Z01.818 PRE-OP EXAMINATION: ICD-10-CM

## 2024-06-19 DIAGNOSIS — M81.0 AGE-RELATED OSTEOPOROSIS WITHOUT CURRENT PATHOLOGICAL FRACTURE: ICD-10-CM

## 2024-06-19 DIAGNOSIS — M25.561 CHRONIC PAIN OF RIGHT KNEE: ICD-10-CM

## 2024-06-19 DIAGNOSIS — Z12.31 ENCOUNTER FOR SCREENING MAMMOGRAM FOR BREAST CANCER: ICD-10-CM

## 2024-06-19 DIAGNOSIS — D69.6 THROMBOCYTOPENIA (HCC): ICD-10-CM

## 2024-06-19 DIAGNOSIS — Z00.00 MEDICARE ANNUAL WELLNESS VISIT, SUBSEQUENT: Primary | ICD-10-CM

## 2024-06-19 DIAGNOSIS — H26.9 CATARACT OF LEFT EYE, UNSPECIFIED CATARACT TYPE: ICD-10-CM

## 2024-06-19 PROCEDURE — 73562 X-RAY EXAM OF KNEE 3: CPT

## 2024-06-19 PROCEDURE — G0439 PPPS, SUBSEQ VISIT: HCPCS | Performed by: PHYSICIAN ASSISTANT

## 2024-06-19 PROCEDURE — 99214 OFFICE O/P EST MOD 30 MIN: CPT | Performed by: PHYSICIAN ASSISTANT

## 2024-06-19 NOTE — PROGRESS NOTES
Ambulatory Visit  Name: Violeta Hollis      : 1945      MRN: [id number]  Encounter Provider: Venessa Kay PA-C  Encounter Date: 2024   Encounter department: [facility]    Assessment & Plan   1. Medicare annual wellness visit, subsequent  2. Chronic pain of right knee  -     XR knee 3 vw right non injury; Future; Expected date: 2024  3. Age-related osteoporosis without current pathological fracture  -     DXA bone density spine hip and pelvis; Future; Expected date: 10/14/2024  4. Thrombocytopenia (HCC)  5. Encounter for screening mammogram for breast cancer  -     Mammo screening bilateral w 3d & cad; Future; Expected date: 2024  6. Personal history of colonic polyps  -     Ambulatory referral to Gastroenterology; Future       Preventive health issues were discussed with patient, and age appropriate screening tests were ordered as noted in patient's After Visit Summary. Personalized health advice and appropriate referrals for health education or preventive services given if needed, as noted in patient's After Visit Summary.    History of Present Illness   {Disappearing Hyperlinks I Encounters * My Last Note * Since Last Visit * History :15085}  HPI   Patient Care Team:  Venessa Kay PA-C as PCP - General (Family Medicine)  MD Fang Crawford MD    Review of Systems  Medical History Reviewed by provider this encounter:       Annual Wellness Visit Questionnaire   Annual Wellness Visit  Social Determinants of Health     Financial Resource Strain: Low Risk  (5/15/2023)    Overall Financial Resource Strain (CARDIA)    • Difficulty of Paying Living Expenses: Not hard at all   Food Insecurity: No Food Insecurity (2024)    Hunger Vital Sign    • Worried About Running Out of Food in the Last Year: Never true    • Ran Out of Food in the Last Year: Never true   Transportation Needs: No Transportation Needs (2024)    PRAPARE - Transportation  "   • Lack of Transportation (Medical): No    • Lack of Transportation (Non-Medical): No   Housing Stability: Low Risk  (6/19/2024)    Housing Stability Vital Sign    • Unable to Pay for Housing in the Last Year: No    • Number of Times Moved in the Last Year: 0    • Homeless in the Last Year: No   Utilities: Not At Risk (6/19/2024)    [facility] Utilities    • Threatened with loss of utilities: No     No results found.    Objective   {Disappearing Hyperlinks   Review Vitals * Enter New Vitals * Results Review * Labs * Imaging * Cardiology * Procedures * Lung Cancer Screening :02180}  /78 (BP Location: Left arm, Cuff Size: Large)   Pulse 86   Ht 4' 11\" (1.499 m)   Wt 72.6 kg (160 lb)   SpO2 98%   BMI 32.32 kg/m²     Physical Exam  Administrative Statements {Disappearing Hyperlinks I  Level of Service * PCMH/PCSP:16613}  {Time Spent Statement (Optional):11883}        "

## 2024-06-19 NOTE — PATIENT INSTRUCTIONS

## 2024-06-19 NOTE — PROGRESS NOTES
Diabetic Foot Exam    Patient's shoes and socks removed.    Right Foot/Ankle   Right Foot Inspection  Skin Exam: skin normal and skin intact. No dry skin, no warmth, no callus, no erythema, no maceration, no abnormal color, no pre-ulcer, no ulcer and no callus.     Toe Exam: ROM and strength within normal limits.     Sensory   Monofilament testing: intact    Vascular  The right DP pulse is 2+. The right PT pulse is 2+.     Left Foot/Ankle  Left Foot Inspection  Skin Exam: skin normal and skin intact. No dry skin, no warmth, no erythema, no maceration, normal color, no pre-ulcer, no ulcer and no callus.     Toe Exam: ROM and strength within normal limits.     Sensory   Monofilament testing: intact    Vascular  The left DP pulse is 2+. The left PT pulse is 2+.     Assign Risk Category  No deformity present  No loss of protective sensation  No weak pulses  Risk: 0  Ambulatory Visit  Name: Violeta Hollis      : 1945      MRN: [id number]  Encounter Provider: Venessa Kay PA-C  Encounter Date: 2024   Encounter department: [facility]    Assessment & Plan   1. Medicare annual wellness visit, subsequent  2. Chronic pain of right knee  Assessment & Plan:  No red flags on exam. Check knee XR and plan for possible Ortho referral.   Orders:  -     XR knee 3 vw right non injury; Future; Expected date: 2024  3. Age-related osteoporosis without current pathological fracture  Assessment & Plan:  Continue Prolia via rheumatology and Ca+D. Recheck DEXA in October.  Orders:  -     DXA bone density spine hip and pelvis; Future; Expected date: 10/14/2024  4. Thrombocytopenia (HCC)  Assessment & Plan:  Stable. Will continue to monitor.  5. Encounter for screening mammogram for breast cancer  -     Mammo screening bilateral w 3d & cad; Future; Expected date: 2024  6. Personal history of colonic polyps  -     Ambulatory referral to Gastroenterology; Future  7. Essential  hypertension  Assessment & Plan:  Controlled. Continue losartan 50 mg daily and HCTZ 25 mg daily.   8. Type 2 diabetes mellitus without complication, without long-term current use of insulin (HCC)  Assessment & Plan:  Improved. Continue to limit carbs in diet. Will continue to monitor.  Lab Results   Component Value Date    HGBA1C 7.1 (H) 06/11/2024     9. Hypokalemia  Assessment & Plan:  Controlled on recent labs. Continue Kcl 10 meq daily.   10. Leg swelling  Assessment & Plan:  Discussed option of transitioning HCTZ to furosemide. Patient will hold off for now and simply take an extra HCTZ daily as needed. We reviewed that it this is often, we would need to check labs to re-evaluate renal function and potassium level. Patient will reach out if often taking an extra. She will strive for good water intake, limited salt, and frequent leg elevation over heart level as well.  11. Pre-op examination  12. Cataract of left eye, unspecified cataract type      Surgical clearance:  The patient is found to be at acceptable risk from a cardiovascular standpoint.  Additional cardiac testing is not necessary.  Preoperative testing reviewed includes n/a.  Re-evaluation is needed if the patient should present with symptoms prior to surgery/ procedure.  Surgical clearance will be faxed to Dr. Wright.          Preventive health issues were discussed with patient, and age appropriate screening tests were ordered as noted in patient's After Visit Summary. Personalized health advice and appropriate referrals for health education or preventive services given if needed, as noted in patient's After Visit Summary.    History of Present Illness     DM 2-diet controlled-A1c was 7.1% last week    Hypertension-on losartan 50 mg daily and HCTZ 25 mg daily - similar at home     Osteoporosis-continues Prolia through rheumatology plus calcium and vitamin D supplementation-due for next DEXA scan in October 2024    Hypokalemia-on KCl 10 mill  equivalents daily-level last week was 3.6    Patient has right knee pain and reports that it is not helping - reports that it is constant pain and then gets sudden severe pain into the left hip - has been doing PT and has been using cane - has been continuing her PT exercises twice weekly - notes that she felt really good when finished PT last year - did 6 weeks or so of therapy at the time    Also notes that her ankles were swollen when she was at [employer] recently - reports that she had been walking a lot on the beach and then the boardwalk the next day             Pre-Op Visit:  The patient is being seen today for preoperative visit.  The procedure that is scheduled is left cataract extraction.  It is scheduled on 7/11/204  with Dr. Wright.   The indication for the surgery is decreased visual acuity.    Surgical Risk Assessment:  Prior Anesthesia:  The patient confirms prior anesthesia and denies problems with anesthesia.  Pertinent Past Medical History:  The patient confirms history of  diabetes and denies  angina,  CAD,  arrhythmia,  DVT,  pulmonary embolism,  chronic liver disease,  chronic kidney disease,  COPD,  asthma,  thyroid disease, and  seizure disorder.   Exercise Capacity:  The patient denies ability to walk 4 blocks without symptoms and denies ability to walk two flights of stairs without symptoms - limited by knee and hip pain, not chest pain/SOB.   Lifestyle Factors:  The patient denies alcohol use, denies tobacco use, and denies drug use.   Symptoms:  The patient denies easy bruising,  chest pain,  cough,  dyspnea,  edema,  palpitations, and  wheezing.   Pertinent Family History:  The patient denies family history of  ischemic heart diease,  adverse reaction to anesthesia,  aneurysm,  bleeding problems,  sudden early death, and  stroke.   Living Situation:  The patient's reports home is secure and denies any post-op concerns with current living situation.         Patient Care Team:  Venessa Kay  ERIN as PCP - General (Family Medicine)  MD Fang Crawford MD    Review of Systems   Constitutional:  Negative for chills and fever.   HENT:  Negative for congestion, rhinorrhea and sore throat.    Respiratory:  Negative for cough, shortness of breath and wheezing.    Cardiovascular:  Positive for leg swelling. Negative for chest pain and palpitations.   Gastrointestinal:  Negative for abdominal pain, constipation, diarrhea, nausea and vomiting.   Genitourinary:  Negative for dysuria and frequency.   Musculoskeletal:  Positive for arthralgias (as in HPI). Negative for myalgias.   Skin:  Negative for rash.   Neurological:  Negative for dizziness, syncope and headaches.   Hematological:  Does not bruise/bleed easily.   Psychiatric/Behavioral:  Negative for dysphoric mood. The patient is not nervous/anxious.      Medical History Reviewed by provider this encounter:       Annual Wellness Visit Questionnaire   Violeta is here for her Subsequent Wellness visit.     Health Risk Assessment:   Patient rates overall health as excellent. Patient feels that their physical health rating is same. Patient is very satisfied with their life. Eyesight was rated as same. Hearing was rated as same. Patient feels that their emotional and mental health rating is same. Patients states they are never, rarely angry. Patient states they are sometimes unusually tired/fatigued. Pain experienced in the last 7 days has been a lot. Patient's pain rating has been 7/10. Patient states that she has experienced no weight loss or gain in last 6 months.     Depression Screening:   PHQ-2 Score: 0      Fall Risk Screening:   In the past year, patient has experienced: history of falling in past year    Number of falls: 2 or more  Injured during fall?: No    Feels unsteady when standing or walking?: Yes    Worried about falling?: Yes      Urinary Incontinence Screening:   Patient has leaked urine accidently in the last six months. Occ with  sneeze or laugh - wears liner - no assistance desired     Home Safety:  Patient has trouble with stairs inside or outside of their home. Patient has working smoke alarms and has working carbon monoxide detector. Home safety hazards include: none.     Nutrition:   Current diet is Diabetic.     Medications:   Patient is currently taking over-the-counter supplements. OTC medications include: see medication list. Patient is able to manage medications.     Activities of Daily Living (ADLs)/Instrumental Activities of Daily Living (IADLs):   Walk and transfer into and out of bed and chair?: Yes  Dress and groom yourself?: Yes    Bathe or shower yourself?: Yes    Feed yourself? Yes  Do your laundry/housekeeping?: Yes  Manage your money, pay your bills and track your expenses?: Yes  Make your own meals?: Yes    Do your own shopping?: Yes    Previous Hospitalizations:   Any hospitalizations or ED visits within the last 12 months?: No      Advance Care Planning:     Durable POA for healthcare: Yes      Cognitive Screening:   Provider or family/friend/caregiver concerned regarding cognition?: No    PREVENTIVE SCREENINGS      Cardiovascular Screening:    General: Screening Current      Diabetes Screening:     General: Screening Not Indicated and History Diabetes      Colorectal Cancer Screening:     General: Screening Current      Breast Cancer Screening:     General: Screening Current      Cervical Cancer Screening:    General: Screening Not Indicated      Osteoporosis Screening:    General: Screening Not Indicated and History Osteoporosis    Due for: DXA Axial      Abdominal Aortic Aneurysm (AAA) Screening:        General: Screening Not Indicated      Lung Cancer Screening:     General: Screening Not Indicated      Hepatitis C Screening:    General: Screening Current    Screening, Brief Intervention, and Referral to Treatment (SBIRT)    Screening  Typical number of drinks in a day: 0  Typical number of drinks in a week:  "0  Interpretation: Low risk drinking behavior.    Social Determinants of Health     Financial Resource Strain: Low Risk  (5/15/2023)    Overall Financial Resource Strain (CARDIA)     Difficulty of Paying Living Expenses: Not hard at all   Food Insecurity: No Food Insecurity (6/19/2024)    Hunger Vital Sign     Worried About Running Out of Food in the Last Year: Never true     Ran Out of Food in the Last Year: Never true   Transportation Needs: No Transportation Needs (6/19/2024)    PRAPARE - Transportation     Lack of Transportation (Medical): No     Lack of Transportation (Non-Medical): No   Housing Stability: Low Risk  (6/19/2024)    Housing Stability Vital Sign     Unable to Pay for Housing in the Last Year: No     Number of Times Moved in the Last Year: 0     Homeless in the Last Year: No   Utilities: Not At Risk (6/19/2024)    [facility] Utilities     Threatened with loss of utilities: No     No results found.    Objective     /78 (BP Location: Left arm, Cuff Size: Large)   Pulse 86   Ht 4' 11\" (1.499 m)   Wt 72.6 kg (160 lb)   SpO2 98%   BMI 32.32 kg/m²     Physical Exam  Vitals reviewed.   Constitutional:       General: She is not in acute distress.     Appearance: Normal appearance. She is well-developed. She is not ill-appearing.   HENT:      Head: Normocephalic and atraumatic.      Right Ear: Tympanic membrane, ear canal and external ear normal.      Left Ear: Tympanic membrane, ear canal and external ear normal.      Mouth/Throat:      Mouth: Mucous membranes are moist.      Pharynx: No oropharyngeal exudate.   Eyes:      Conjunctiva/sclera: Conjunctivae normal.      Pupils: Pupils are equal, round, and reactive to light.   Neck:      Thyroid: No thyromegaly.      Vascular: No carotid bruit.   Cardiovascular:      Rate and Rhythm: Normal rate and regular rhythm.      Pulses: Normal pulses. no weak pulses.           Dorsalis pedis pulses are 2+ on the right side and 2+ on the left side.        " Posterior tibial pulses are 2+ on the right side and 2+ on the left side.      Heart sounds: Normal heart sounds. No murmur heard.  Pulmonary:      Effort: Pulmonary effort is normal.      Breath sounds: Normal breath sounds. No wheezing, rhonchi or rales.   Abdominal:      General: Bowel sounds are normal. There is no distension.      Palpations: Abdomen is soft. There is no mass.      Tenderness: There is no abdominal tenderness. There is no guarding.   Musculoskeletal:      Cervical back: Normal range of motion and neck supple.      Right lower leg: Edema (1-2+) present.      Left lower leg: Edema (1-2+) present.      Comments: No erythema/swelling/effusion noted of either knee, TTP over the medial right knee, negative Lenard's bilaterally, negative anterior and posterior drawer bilaterally, negative medial and lateral distraction bilaterally, negative Apley compression and distraction bilaterally     Feet:      Right foot:      Skin integrity: No ulcer, skin breakdown, erythema, warmth, callus or dry skin.      Left foot:      Skin integrity: No ulcer, skin breakdown, erythema, warmth, callus or dry skin.   Lymphadenopathy:      Cervical: No cervical adenopathy.   Skin:     General: Skin is warm and dry.      Findings: No rash.   Neurological:      Mental Status: She is alert.      Sensory: No sensory deficit.      Comments: 5/5 strength in UE and LE   Psychiatric:         Mood and Affect: Mood normal.         Behavior: Behavior normal.         Thought Content: Thought content normal.         Judgment: Judgment normal.       Administrative Statements

## 2024-06-20 ENCOUNTER — TELEPHONE (OUTPATIENT)
Age: 79
End: 2024-06-20

## 2024-06-20 DIAGNOSIS — M25.561 CHRONIC PAIN OF RIGHT KNEE: Primary | ICD-10-CM

## 2024-06-20 DIAGNOSIS — G89.29 CHRONIC PAIN OF RIGHT KNEE: Primary | ICD-10-CM

## 2024-06-20 DIAGNOSIS — M17.11 OSTEOARTHRITIS OF RIGHT KNEE, UNSPECIFIED OSTEOARTHRITIS TYPE: ICD-10-CM

## 2024-06-20 NOTE — TELEPHONE ENCOUNTER
Patient called the office regarding her Xray results of her knee. Patient would like a call back once the provider reviews the results. Please review and advise

## 2024-06-21 PROBLEM — G89.29 CHRONIC PAIN OF RIGHT KNEE: Status: ACTIVE | Noted: 2024-06-21

## 2024-06-21 PROBLEM — M25.561 CHRONIC PAIN OF RIGHT KNEE: Status: ACTIVE | Noted: 2024-06-21

## 2024-06-21 NOTE — TELEPHONE ENCOUNTER
Called Pt and informed x-ray shows osteophytes (arthritis) in the right knee in multiple locations.  There is a small amount of extra fluid in the joint from inflammation and slight angling of the knee joint.  I recommend follow-up with orthopedics.  An order has been entered.   Pt reviewed on [facility].

## 2024-06-21 NOTE — ASSESSMENT & PLAN NOTE
Discussed option of transitioning HCTZ to furosemide. Patient will hold off for now and simply take an extra HCTZ daily as needed. We reviewed that it this is often, we would need to check labs to re-evaluate renal function and potassium level. Patient will reach out if often taking an extra. She will strive for good water intake, limited salt, and frequent leg elevation over heart level as well.

## 2024-06-21 NOTE — ASSESSMENT & PLAN NOTE
Improved. Continue to limit carbs in diet. Will continue to monitor.  Lab Results   Component Value Date    HGBA1C 7.1 (H) 06/11/2024

## 2024-06-26 ENCOUNTER — TELEPHONE (OUTPATIENT)
Dept: FAMILY MEDICINE CLINIC | Facility: CLINIC | Age: 79
End: 2024-06-26

## 2024-06-26 NOTE — TELEPHONE ENCOUNTER
6/26 10am: Faxed Pre-op Clearance paperwork and Visit Notes to [facility]

## 2024-07-09 ENCOUNTER — OFFICE VISIT (OUTPATIENT)
Dept: OBGYN CLINIC | Facility: MEDICAL CENTER | Age: 79
End: 2024-07-09
Payer: MEDICARE

## 2024-07-09 VITALS
HEIGHT: 59 IN | HEART RATE: 86 BPM | WEIGHT: 161 LBS | DIASTOLIC BLOOD PRESSURE: 80 MMHG | SYSTOLIC BLOOD PRESSURE: 128 MMHG | BODY MASS INDEX: 32.46 KG/M2

## 2024-07-09 DIAGNOSIS — M17.11 PRIMARY OSTEOARTHRITIS OF RIGHT KNEE: ICD-10-CM

## 2024-07-09 DIAGNOSIS — G89.29 CHRONIC PAIN OF RIGHT KNEE: Primary | ICD-10-CM

## 2024-07-09 DIAGNOSIS — M25.561 CHRONIC PAIN OF RIGHT KNEE: Primary | ICD-10-CM

## 2024-07-09 PROCEDURE — 20610 DRAIN/INJ JOINT/BURSA W/O US: CPT | Performed by: EMERGENCY MEDICINE

## 2024-07-09 PROCEDURE — 99204 OFFICE O/P NEW MOD 45 MIN: CPT | Performed by: EMERGENCY MEDICINE

## 2024-07-09 RX ORDER — ROPIVACAINE HYDROCHLORIDE 2 MG/ML
4 INJECTION, SOLUTION EPIDURAL; INFILTRATION; PERINEURAL
Status: SHIPPED | OUTPATIENT
Start: 2024-07-09

## 2024-07-09 RX ORDER — LIDOCAINE HYDROCHLORIDE 10 MG/ML
4 INJECTION, SOLUTION INFILTRATION; PERINEURAL
Status: COMPLETED | OUTPATIENT
Start: 2024-07-09 | End: 2024-07-09

## 2024-07-09 RX ORDER — ROPIVACAINE HYDROCHLORIDE 2 MG/ML
4 INJECTION, SOLUTION EPIDURAL; INFILTRATION; PERINEURAL
Status: DISCONTINUED | OUTPATIENT
Start: 2024-07-09 | End: 2024-07-09

## 2024-07-09 RX ORDER — TRIAMCINOLONE ACETONIDE 40 MG/ML
40 INJECTION, SUSPENSION INTRA-ARTICULAR; INTRAMUSCULAR
Status: COMPLETED | OUTPATIENT
Start: 2024-07-09 | End: 2024-07-09

## 2024-07-09 RX ADMIN — LIDOCAINE HYDROCHLORIDE 4 ML: 10 INJECTION, SOLUTION INFILTRATION; PERINEURAL at 11:00

## 2024-07-09 RX ADMIN — TRIAMCINOLONE ACETONIDE 40 MG: 40 INJECTION, SUSPENSION INTRA-ARTICULAR; INTRAMUSCULAR at 11:00

## 2024-07-09 RX ADMIN — ROPIVACAINE HYDROCHLORIDE 4 ML: 2 INJECTION, SOLUTION EPIDURAL; INFILTRATION; PERINEURAL at 11:38

## 2024-07-09 NOTE — PROGRESS NOTES
Assessment/Plan:    Diagnoses and all orders for this visit:    Chronic pain of right knee  -     Ambulatory Referral to Orthopedic Surgery  -     Large joint arthrocentesis: R knee  -     ropivacaine (NAROPIN) injection 4 mL    Primary osteoarthritis of right knee  -     Ambulatory Referral to Orthopedic Surgery  -     Large joint arthrocentesis: R knee  -     ropivacaine (NAROPIN) injection 4 mL    Chronic worsening condition  Right knee CSI provided today also discussed viscosupplementation.  Status post PT    Return in about 3 months (around 10/9/2024).      Subjective:   Patient ID: Violeta Hollis is a 79 y.o. female.    Patient presents with daughter for worsening chronic right knee pain and swelling referred by PCP.  Most of her pain is anterior medially.  Underwent PT      Review of Systems    The following portions of the patient's chart were reviewed and updated as appropriate:   Allergy:  No Known Allergies    Medications:    Current Outpatient Medications:     Calcium Carbonate-Vitamin D 600-200 MG-UNIT TABS, Take 1 tablet by mouth 2 (two) times a day, Disp: , Rfl:     Cholecalciferol (VITAMIN D3) 1000 units CAPS, Take by mouth daily  , Disp: , Rfl:     denosumab (PROLIA) 60 mg/mL, Inject under the skin every 6 (six) months  , Disp: , Rfl:     hydroCHLOROthiazide 25 mg tablet, Take 1 tablet (25 mg total) by mouth daily, Disp: 90 tablet, Rfl: 1    ketorolac (ACULAR) 0.5 % ophthalmic solution, INSTILL 1 DROP INTO SURGERY EYE 4 TIMES A DAY START RIGHT EYE ON 2/12/2024, Disp: , Rfl:     losartan (COZAAR) 50 mg tablet, Take 1 tablet (50 mg total) by mouth daily, Disp: 90 tablet, Rfl: 1    Multiple Vitamins-Minerals (DAILY MULTI PO), Take by mouth daily  , Disp: , Rfl:     ofloxacin (OCUFLOX) 0.3 % ophthalmic solution, INSTILL 1 DROP INTO SURGERY EYE 4 TIMES A DAY START RIGHT EYE ON 2/12/2024, Disp: , Rfl:     potassium chloride (Klor-Con M10) 10 mEq tablet, Take 1 tablet (10 mEq total) by mouth daily,  "Disp: 90 tablet, Rfl: 1    prednisoLONE acetate (PRED FORTE) 1 % ophthalmic suspension, Instill 1 drop IN SURGERY EYE four times a day (BEGINNING DAY OF SURGERY, BUT AFTER PROCEDURE), Disp: , Rfl:     Current Facility-Administered Medications:     ropivacaine (NAROPIN) injection 4 mL, 4 mL, Epidural, Titrated,     Patient Active Problem List   Diagnosis    Dietary calcium deficiency    Obesity (BMI 30.0-34.9)    Osteoporosis    Hx of colonic polyps    Heart murmur    Type 2 diabetes mellitus without complication, without long-term current use of insulin (HCC)    Thrombocytopenia (HCC)    Essential hypertension    Hypokalemia    Low hematocrit    Leg swelling    Primary generalized (osteo)arthritis    Lumbar spondylosis    Acute left-sided low back pain without sciatica    Acute pain of right knee    Chronic pain of right knee       Objective:  /80   Pulse 86   Ht 4' 11\" (1.499 m)   Wt 73 kg (161 lb)   BMI 32.52 kg/m²     Right Knee Exam     Tenderness   The patient is experiencing tenderness in the medial joint line.    Other   Erythema: absent  Sensation: normal  Swelling: mild  Effusion: effusion present          Observations     Right Knee   Positive for effusion.       Physical Exam  Musculoskeletal:      Right knee: Effusion present.           Neurologic Exam    Large joint arthrocentesis: R knee  Universal Protocol:  Consent: Verbal consent obtained.  Risks and benefits: risks, benefits and alternatives were discussed  Consent given by: patient  Time out: Immediately prior to procedure a \"time out\" was called to verify the correct patient, procedure, equipment, support staff and site/side marked as required.  Timeout called at: 7/9/2024 11:24 AM.  Patient understanding: patient states understanding of the procedure being performed  Test results: test results available and properly labeled  Site marked: the operative site was marked  Patient identity confirmed: verbally with patient  Supporting " Documentation  Indications: pain   Procedure Details  Location: knee - R knee  Preparation: Patient was prepped and draped in the usual sterile fashion  Needle size: 22 G  Ultrasound guidance: no  Approach: anterolateral  Medications administered: 4 mL lidocaine 1 %; 40 mg triamcinolone acetonide 40 mg/mL    Patient tolerance: patient tolerated the procedure well with no immediate complications  Dressing:  Sterile dressing applied    No erythema of knee(s)        I have personally reviewed the written report and images of the pertinent studies.   RIGHT KNEE  Small osteophytes medial and lateral tibial plateau and lateral femoral epicondyle. Minimal valgus angulation. Tiny patellar enthesophyte.  No acute fracture or dislocation.  No osseous lesions.  Small effusion.            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 NO NEOPLASIA ; IMPRESSION: 10/27/14; RISA MORRIS    HYSTEROSCOPY      MOHS SURGERY  02/19/2020    BCC on face    TONSILLECTOMY         Social History     Socioeconomic History    Marital status: Single     Spouse name: Not on file    Number of children: Not on file    Years of education: Not on file    Highest education level: Not on file   Occupational History    Occupation: [employer]   Tobacco Use    Smoking status: Former     Current packs/day: 0.00     Average packs/day: 0.5 packs/day for 5.0 years (2.5 ttl pk-yrs)     Types: Cigarettes     Start date: 1/1/1965     Quit date: 1/1/1970     Years since  quittin.5     Passive exposure: Never    Smokeless tobacco: Never    Tobacco comments:     STARTED AT AGE 18. STOPPED AT AGE 23.    Vaping Use    Vaping status: Never Used   Substance and Sexual Activity    Alcohol use: Not Currently     Comment: Wine for Special Occassion    Drug use: Never    Sexual activity: Not on file   Other Topics Concern    Not on file   Social History Narrative    EXERCISES MODERATELY LESS THAN 3 TIMES WEEK     Social Determinants of Health     Financial Resource Strain: Low Risk  (5/15/2023)    Overall Financial Resource Strain (CARDIA)     Difficulty of Paying Living Expenses: Not hard at all   Food Insecurity: No Food Insecurity (2024)    Hunger Vital Sign     Worried About Running Out of Food in the Last Year: Never true     Ran Out of Food in the Last Year: Never true   Transportation Needs: No Transportation Needs (2024)    PRAPARE - Transportation     Lack of Transportation (Medical): No     Lack of Transportation (Non-Medical): No   Physical Activity: Not on file   Stress: Not on file   Social Connections: Unknown (2024)    Received from [employer]    Social Connections     How often do you feel lonely or isolated from those around you? (Adult - for ages 18 years and over): Not on file   Intimate Partner Violence: Not on file   Housing Stability: Low Risk  (2024)    Housing Stability Vital Sign     Unable to Pay for Housing in the Last Year: No     Number of Times Moved in the Last Year: 0     Homeless in the Last Year: No       Family History   Problem Relation Age of Onset    Kidney cancer Mother 70        RENAL CANCER    Hypertension Mother     Skin cancer Mother     Osteoporosis Mother     Osteoporotic fracture Mother         hip - age 92     Osteoporotic fracture Paternal Grandmother

## 2024-07-19 ENCOUNTER — TELEPHONE (OUTPATIENT)
Dept: FAMILY MEDICINE CLINIC | Facility: CLINIC | Age: 79
End: 2024-07-19

## 2024-07-19 NOTE — TELEPHONE ENCOUNTER
----- Message from Patel Myers MD sent at 7/19/2024  3:05 PM EDT -----  Mammogram okay.  Repeat 1 year.

## 2024-08-20 ENCOUNTER — OFFICE VISIT (OUTPATIENT)
Dept: GASTROENTEROLOGY | Facility: MEDICAL CENTER | Age: 79
End: 2024-08-20
Payer: MEDICARE

## 2024-08-20 VITALS
BODY MASS INDEX: 31.77 KG/M2 | OXYGEN SATURATION: 96 % | HEIGHT: 60 IN | WEIGHT: 161.8 LBS | HEART RATE: 74 BPM | SYSTOLIC BLOOD PRESSURE: 126 MMHG | TEMPERATURE: 96.9 F | DIASTOLIC BLOOD PRESSURE: 68 MMHG

## 2024-08-20 DIAGNOSIS — Z86.010 PERSONAL HISTORY OF COLONIC POLYPS: ICD-10-CM

## 2024-08-20 PROCEDURE — 99204 OFFICE O/P NEW MOD 45 MIN: CPT | Performed by: PHYSICIAN ASSISTANT

## 2024-08-20 NOTE — PROGRESS NOTES
[facility] - Outpatient Consultation  Violeta Hollis 79 y.o. female MRN: [id number]  Encounter: [id number]      Assessment and Plan    1. Personal history of colon polyps  The patient's last colonoscopy was 7/2021 with 2 precancerous polyps removed and a 3-year recall.  The patient has no GI symptoms or complaints.  -Discussed the risks and benefits of colonoscopy given the patient's age and comorbidities, discussed that I think it is very reasonable to do a colonoscopy at this time but this would likely be her last, the patient would like to consider whether or not she would like to proceed and call us to schedule if so    Follow-up as needed    ______________________________________________________________________    History of Present Illness  Violeta Hollis is a 79 y.o. female with HTN and DM 2 here for consultation of a personal history of colon polyps.  The patient's last colonoscopy was 7/2021 revealed 2 precancerous polyps with 3-year recall.  The patient has no GI symptoms or complaints.      Review of Systems   Constitutional:  Negative for activity change, appetite change, chills, fatigue, fever and unexpected weight change.       Past Medical History  Past Medical History:   Diagnosis Date    Chicken pox     Diabetes mellitus (HCC)     Fall 05/01/2019    Hypertension     Kidney stones     Malignant neoplasm of skin     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    Osteoarthritis     Osteoporosis        Past Social history  Past Surgical History:   Procedure Laterality Date    CATARACT EXTRACTION  07/2024    Pt reported    COLONOSCOPY  2018    COLONOSCOPY  2015    COMPLETE; DUE 3 YRS    COLONOSCOPY  06/20/2018    polyp x1    DILATION AND CURETTAGE OF UTERUS  08/2013    THICKENED ENDO ON USG, CVX STENOSIS, PATH: BENIGN POLYP FRAGMENTS AND INACTIVE ENDOMETRIUM. NO NEOPLASIA ; IMPRESSION: 10/27/14; RISA MORRIS    HYSTEROSCOPY      MOHS SURGERY  02/19/2020    BCC on face    TONSILLECTOMY        Social History     Socioeconomic History    Marital status: Single     Spouse name: Not on file    Number of children: Not on file    Years of education: Not on file    Highest education level: Not on file   Occupational History    Occupation: [employer]   Tobacco Use    Smoking status: Former     Current packs/day: 0.00     Average packs/day: 0.5 packs/day for 5.0 years (2.5 ttl pk-yrs)     Types: Cigarettes     Start date: 1965     Quit date: 1970     Years since quittin.6     Passive exposure: Never    Smokeless tobacco: Never    Tobacco comments:     STARTED AT AGE 18. STOPPED AT AGE 23.    Vaping Use    Vaping status: Never Used   Substance and Sexual Activity    Alcohol use: Not Currently     Comment: Wine for Special Occassion    Drug use: Never    Sexual activity: Not on file   Other Topics Concern    Not on file   Social History Narrative    EXERCISES MODERATELY LESS THAN 3 TIMES WEEK     Social Determinants of Health     Financial Resource Strain: Low Risk  (5/15/2023)    Overall Financial Resource Strain (CARDIA)     Difficulty of Paying Living Expenses: Not hard at all   Food Insecurity: No Food Insecurity (2024)    Hunger Vital Sign     Worried About Running Out of Food in the Last Year: Never true     Ran Out of Food in the Last Year: Never true   Transportation Needs: No Transportation Needs (2024)    PRAPARE - Transportation     Lack of Transportation (Medical): No     Lack of Transportation (Non-Medical): No   Physical Activity: Not on file   Stress: Not on file   Social Connections: Unknown (2024)    Received from [facility]    Social Connections     How often do you feel lonely or isolated from those around you? (Adult - for ages 18 years and over): Not on file   Intimate Partner Violence: Not on file   Housing Stability: Low Risk  (2024)    Housing Stability Vital Sign     Unable to Pay for Housing in the Last Year: No     Number of Times Moved in the Last  Year: 0     Homeless in the Last Year: No     Social History     Substance and Sexual Activity   Alcohol Use Not Currently    Comment: Wine for Special Occassion     Social History     Substance and Sexual Activity   Drug Use Never     Social History     Tobacco Use   Smoking Status Former    Current packs/day: 0.00    Average packs/day: 0.5 packs/day for 5.0 years (2.5 ttl pk-yrs)    Types: Cigarettes    Start date: 1965    Quit date: 1970    Years since quittin.6    Passive exposure: Never   Smokeless Tobacco Never   Tobacco Comments    STARTED AT AGE 18. STOPPED AT AGE 23.        Past Family History  Family History   Problem Relation Age of Onset    Kidney cancer Mother 70        RENAL CANCER    Hypertension Mother     Skin cancer Mother     Osteoporosis Mother     Osteoporotic fracture Mother         hip - age 92     Osteoporotic fracture Paternal Grandmother        Current Medications  Current Outpatient Medications   Medication Sig Dispense Refill    Calcium Carbonate-Vitamin D 600-200 MG-UNIT TABS Take 1 tablet by mouth 2 (two) times a day      Cholecalciferol (VITAMIN D3) 1000 units CAPS Take by mouth daily        denosumab (PROLIA) 60 mg/mL Inject under the skin every 6 (six) months        hydroCHLOROthiazide 25 mg tablet Take 1 tablet (25 mg total) by mouth daily 90 tablet 1    ketorolac (ACULAR) 0.5 % ophthalmic solution INSTILL 1 DROP INTO SURGERY EYE 4 TIMES A DAY START RIGHT EYE ON 2024      losartan (COZAAR) 50 mg tablet Take 1 tablet (50 mg total) by mouth daily 90 tablet 1    Multiple Vitamins-Minerals (DAILY MULTI PO) Take by mouth daily        potassium chloride (Klor-Con M10) 10 mEq tablet Take 1 tablet (10 mEq total) by mouth daily 90 tablet 1    prednisoLONE acetate (PRED FORTE) 1 % ophthalmic suspension Instill 1 drop IN SURGERY EYE four times a day (BEGINNING DAY OF SURGERY, BUT AFTER PROCEDURE)      ofloxacin (OCUFLOX) 0.3 % ophthalmic solution INSTILL 1 DROP INTO SURGERY  EYE 4 TIMES A DAY START RIGHT EYE ON 2/12/2024 (Patient not taking: Reported on 8/20/2024)       Current Facility-Administered Medications   Medication Dose Route Frequency Provider Last Rate Last Admin    ropivacaine (NAROPIN) injection 4 mL  4 mL Intra-articular Titrated    4 mL at 07/09/24 1138       Allergies  No Known Allergies      The following portions of the patient's history were reviewed and updated as appropriate: allergies, current medications, past medical history, past social history, past surgical history and problem list.      Vitals  Vitals:    08/20/24 1343   BP: 126/68   BP Location: Left arm   Patient Position: Sitting   Cuff Size: Standard   Pulse: 74   Temp: (!) 96.9 °F (36.1 °C)   TempSrc: Tympanic   SpO2: 96%   Weight: 73.4 kg (161 lb 12.8 oz)   Height: 5' (1.524 m)         Physical Exam  Constitutional   General appearance: Patient is seated and in no acute distress, well appearing and well nourished.   Head and Face   Head and face: Normal.    Eyes   Conjunctiva and lids: No erythema, swelling or discharge.  Anicteric.  Ears, Nose, Mouth, and Throat   Hearing: Normal.    Neck: Supple, trachea midline.  Pulmonary   Respiratory effort: No increased work of breathing or signs of respiratory distress.    Cardiovascular   Examination of extremities for edema and/or varicosities: Normal.    Musculoskeletal   Gait and station: Normal    Skin   Skin and subcutaneous tissue: Warm, dry, and intact. No visible jaundice, lesions or rashes.  Psychiatric   Judgment and insight: Normal  Recent and remote memory:  Normal  Mood and affect: Normal      Results  No visits with results within 1 Day(s) from this visit.   Latest known visit with results is:   Orders Only on 06/11/2024   Component Date Value    Total Cholesterol 06/11/2024 166     HDL 06/11/2024 67     Triglycerides 06/11/2024 61     LDL Calculated 06/11/2024 85     Chol HDLC Ratio 06/11/2024 2.5     Non-HDL Cholesterol 06/11/2024 99      Creatinine, Urine 06/11/2024 145     Albumin,U,Random 06/11/2024 2.1     Microalb/Creat Ratio 06/11/2024 14     Glucose, Random 06/11/2024 113 (H)     BUN 06/11/2024 19     Creatinine 06/11/2024 0.86     eGFR 06/11/2024 69     SL AMB BUN/CREATININE RA* 06/11/2024 SEE NOTE:     Sodium 06/11/2024 137     Potassium 06/11/2024 3.6     Chloride 06/11/2024 104     CO2 06/11/2024 25     Calcium 06/11/2024 10.3     Protein, Total 06/11/2024 6.6     Albumin 06/11/2024 3.4 (L)     Globulin 06/11/2024 3.2     Albumin/Globulin Ratio 06/11/2024 1.1     TOTAL BILIRUBIN 06/11/2024 2.0 (H)     Alkaline Phosphatase 06/11/2024 76     AST 06/11/2024 39 (H)     ALT 06/11/2024 17     White Blood Cell Count 06/11/2024 3.9     Red Blood Cell Count 06/11/2024 3.41 (L)     Hemoglobin 06/11/2024 11.9     HCT 06/11/2024 34.8 (L)     MCV 06/11/2024 102.1 (H)     MCH 06/11/2024 34.9 (H)     MCHC 06/11/2024 34.2     RDW 06/11/2024 12.7     Platelet Count 06/11/2024 118 (L)     SL AMB MPV 06/11/2024 10.6     Neutrophils (Absolute) 06/11/2024 1,977     Lymphocytes (Absolute) 06/11/2024 1,392     Monocytes (Absolute) 06/11/2024 289     Eosinophils (Absolute) 06/11/2024 211     Basophils ABS 06/11/2024 31     Neutrophils 06/11/2024 50.7     Lymphocytes 06/11/2024 35.7     Monocytes 06/11/2024 7.4     Eosinophils 06/11/2024 5.4     Basophils PCT 06/11/2024 0.8     TSH W/RFX TO FREE T4 06/11/2024 1.35     Vitamin D, 25-Hydroxy, S* 06/11/2024 69     Hemoglobin A1C 06/11/2024 7.1 (H)        Radiology Results  No results found.    Orders  No orders of the defined types were placed in this encounter.

## 2024-10-02 DIAGNOSIS — I10 ESSENTIAL HYPERTENSION: ICD-10-CM

## 2024-10-02 RX ORDER — POTASSIUM CHLORIDE 750 MG/1
10 TABLET, EXTENDED RELEASE ORAL DAILY
Qty: 90 TABLET | Refills: 1 | Status: SHIPPED | OUTPATIENT
Start: 2024-10-02

## 2024-10-02 RX ORDER — LOSARTAN POTASSIUM 50 MG/1
50 TABLET ORAL DAILY
Qty: 90 TABLET | Refills: 1 | Status: SHIPPED | OUTPATIENT
Start: 2024-10-02

## 2024-10-02 RX ORDER — HYDROCHLOROTHIAZIDE 25 MG/1
25 TABLET ORAL DAILY
Qty: 90 TABLET | Refills: 1 | Status: SHIPPED | OUTPATIENT
Start: 2024-10-02

## 2024-10-04 ENCOUNTER — TELEPHONE (OUTPATIENT)
Dept: FAMILY MEDICINE CLINIC | Facility: CLINIC | Age: 79
End: 2024-10-04

## 2024-10-09 ENCOUNTER — OFFICE VISIT (OUTPATIENT)
Dept: OBGYN CLINIC | Facility: MEDICAL CENTER | Age: 79
End: 2024-10-09
Payer: MEDICARE

## 2024-10-09 VITALS
SYSTOLIC BLOOD PRESSURE: 136 MMHG | BODY MASS INDEX: 31.61 KG/M2 | HEART RATE: 76 BPM | HEIGHT: 60 IN | DIASTOLIC BLOOD PRESSURE: 84 MMHG | WEIGHT: 161 LBS

## 2024-10-09 DIAGNOSIS — G89.29 CHRONIC PAIN OF RIGHT KNEE: Primary | ICD-10-CM

## 2024-10-09 DIAGNOSIS — M17.11 PRIMARY OSTEOARTHRITIS OF RIGHT KNEE: ICD-10-CM

## 2024-10-09 DIAGNOSIS — M25.561 CHRONIC PAIN OF RIGHT KNEE: Primary | ICD-10-CM

## 2024-10-09 PROCEDURE — 20610 DRAIN/INJ JOINT/BURSA W/O US: CPT | Performed by: EMERGENCY MEDICINE

## 2024-10-09 PROCEDURE — 99212 OFFICE O/P EST SF 10 MIN: CPT | Performed by: EMERGENCY MEDICINE

## 2024-10-09 RX ORDER — ROPIVACAINE HYDROCHLORIDE 2 MG/ML
4 INJECTION, SOLUTION EPIDURAL; INFILTRATION; PERINEURAL ONCE
Status: COMPLETED | OUTPATIENT
Start: 2024-10-09 | End: 2024-10-09

## 2024-10-09 RX ORDER — TRIAMCINOLONE ACETONIDE 40 MG/ML
40 INJECTION, SUSPENSION INTRA-ARTICULAR; INTRAMUSCULAR
Status: COMPLETED | OUTPATIENT
Start: 2024-10-09 | End: 2024-10-09

## 2024-10-09 RX ADMIN — TRIAMCINOLONE ACETONIDE 40 MG: 40 INJECTION, SUSPENSION INTRA-ARTICULAR; INTRAMUSCULAR at 12:15

## 2024-10-09 RX ADMIN — ROPIVACAINE HYDROCHLORIDE 4 ML: 2 INJECTION, SOLUTION EPIDURAL; INFILTRATION; PERINEURAL at 12:34

## 2024-10-09 RX ADMIN — ROPIVACAINE HYDROCHLORIDE 4 ML: 2 INJECTION, SOLUTION EPIDURAL; INFILTRATION; PERINEURAL at 12:24

## 2024-10-09 NOTE — PROGRESS NOTES
Assessment/Plan:    Diagnoses and all orders for this visit:    Chronic pain of right knee  -     Large joint arthrocentesis: R knee  -     ropivacaine (NAROPIN) injection 4 mL    Primary osteoarthritis of right knee  -     Large joint arthrocentesis: R knee  -     ropivacaine (NAROPIN) injection 4 mL    T/c Visco    Return in about 3 months (around 1/9/2025).      Subjective:   Patient ID: Violeta Hollis is a 79 y.o. female.    Violeta returns for chronic right knee pain/OA requesting CSI  Received about 2 months of benefit from previous CSI        Review of Systems    The following portions of the patient's chart were reviewed and updated as appropriate:   Allergy:  No Known Allergies    Medications:    Current Outpatient Medications:     Calcium Carbonate-Vitamin D 600-200 MG-UNIT TABS, Take 1 tablet by mouth 2 (two) times a day, Disp: , Rfl:     Cholecalciferol (VITAMIN D3) 1000 units CAPS, Take by mouth daily  , Disp: , Rfl:     denosumab (PROLIA) 60 mg/mL, Inject under the skin every 6 (six) months  , Disp: , Rfl:     hydroCHLOROthiazide 25 mg tablet, take 1 tablet by mouth once daily, Disp: 90 tablet, Rfl: 1    ketorolac (ACULAR) 0.5 % ophthalmic solution, INSTILL 1 DROP INTO SURGERY EYE 4 TIMES A DAY START RIGHT EYE ON 2/12/2024, Disp: , Rfl:     losartan (COZAAR) 50 mg tablet, take 1 tablet by mouth once daily, Disp: 90 tablet, Rfl: 1    Multiple Vitamins-Minerals (DAILY MULTI PO), Take by mouth daily  , Disp: , Rfl:     potassium chloride (Klor-Con M10) 10 mEq tablet, take 1 tablet by mouth once daily, Disp: 90 tablet, Rfl: 1    prednisoLONE acetate (PRED FORTE) 1 % ophthalmic suspension, Instill 1 drop IN SURGERY EYE four times a day (BEGINNING DAY OF SURGERY, BUT AFTER PROCEDURE), Disp: , Rfl:     ofloxacin (OCUFLOX) 0.3 % ophthalmic solution, INSTILL 1 DROP INTO SURGERY EYE 4 TIMES A DAY START RIGHT EYE ON 2/12/2024 (Patient not taking: Reported on 8/20/2024), Disp: , Rfl:     Current  "Facility-Administered Medications:     ropivacaine (NAROPIN) injection 4 mL, 4 mL, Intra-articular, Titrated, , 4 mL at 10/09/24 1224    Patient Active Problem List   Diagnosis    Dietary calcium deficiency    Obesity (BMI 30.0-34.9)    Osteoporosis    Hx of colonic polyps    Heart murmur    Type 2 diabetes mellitus without complication, without long-term current use of insulin (HCC)    Thrombocytopenia (HCC)    Essential hypertension    Hypokalemia    Low hematocrit    Leg swelling    Primary generalized (osteo)arthritis    Lumbar spondylosis    Acute left-sided low back pain without sciatica    Acute pain of right knee    Chronic pain of right knee       Objective:  /84   Pulse 76   Ht 5' (1.524 m)   Wt 73 kg (161 lb)   BMI 31.44 kg/m²     Right Knee Exam     Other   Erythema: absent            Physical Exam      Neurologic Exam    Large joint arthrocentesis: R knee  Universal Protocol:  Consent: Verbal consent obtained.  Risks and benefits: risks, benefits and alternatives were discussed  Consent given by: patient  Time out: Immediately prior to procedure a \"time out\" was called to verify the correct patient, procedure, equipment, support staff and site/side marked as required.  Timeout called at: 10/9/2024 12:20 PM.  Patient understanding: patient states understanding of the procedure being performed  Test results: test results available and properly labeled  Site marked: the operative site was marked  Patient identity confirmed: verbally with patient  Supporting Documentation  Indications: pain   Procedure Details  Location: knee - R knee  Preparation: Patient was prepped and draped in the usual sterile fashion  Needle size: 22 G  Ultrasound guidance: no  Approach: anterolateral  Medications administered: 40 mg triamcinolone acetonide 40 mg/mL    Patient tolerance: patient tolerated the procedure well with no immediate complications  Dressing:  Sterile dressing applied    No erythema of " knee(s)        I have personally reviewed the written report of the pertinent studies.             Past Medical History:   Diagnosis Date    Chicken pox     Diabetes mellitus (HCC)     Fall 2019    Hypertension     Kidney stones     Malignant neoplasm of skin     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    Osteoarthritis     Osteoporosis        Past Surgical History:   Procedure Laterality Date    CATARACT EXTRACTION  2024    Pt reported    COLONOSCOPY      COLONOSCOPY      COMPLETE; DUE 3 YRS    COLONOSCOPY  2018    polyp x1    DILATION AND CURETTAGE OF UTERUS  2013    THICKENED ENDO ON USG, CVX STENOSIS, PATH: BENIGN POLYP FRAGMENTS AND INACTIVE ENDOMETRIUM. NO NEOPLASIA ; IMPRESSION: 10/27/14; RISA MORRIS    HYSTEROSCOPY      MOHS SURGERY  2020    BCC on face    TONSILLECTOMY         Social History     Socioeconomic History    Marital status: Single     Spouse name: Not on file    Number of children: Not on file    Years of education: Not on file    Highest education level: Not on file   Occupational History    Occupation: [employer]   Tobacco Use    Smoking status: Former     Current packs/day: 0.00     Average packs/day: 0.5 packs/day for 5.0 years (2.5 ttl pk-yrs)     Types: Cigarettes     Start date: 1965     Quit date: 1970     Years since quittin.8     Passive exposure: Never    Smokeless tobacco: Never    Tobacco comments:     STARTED AT AGE 18. STOPPED AT AGE 23.    Vaping Use    Vaping status: Never Used   Substance and Sexual Activity    Alcohol use: Not Currently     Comment: Wine for Special Occassion    Drug use: Never    Sexual activity: Not on file   Other Topics Concern    Not on file   Social History Narrative    EXERCISES MODERATELY LESS THAN 3 TIMES WEEK     Social Determinants of Health     Financial Resource Strain: Low Risk  (5/15/2023)    Overall Financial Resource Strain (CARDIA)     Difficulty of Paying Living Expenses: Not hard at all   Food  Insecurity: No Food Insecurity (6/19/2024)    Hunger Vital Sign     Worried About Running Out of Food in the Last Year: Never true     Ran Out of Food in the Last Year: Never true   Transportation Needs: No Transportation Needs (6/19/2024)    PRAPARE - Transportation     Lack of Transportation (Medical): No     Lack of Transportation (Non-Medical): No   Physical Activity: Not on file   Stress: Not on file   Social Connections: Unknown (6/18/2024)    Received from [place]    Social Connections     How often do you feel lonely or isolated from those around you? (Adult - for ages 18 years and over): Not on file   Intimate Partner Violence: Not on file   Housing Stability: Low Risk  (6/19/2024)    Housing Stability Vital Sign     Unable to Pay for Housing in the Last Year: No     Number of Times Moved in the Last Year: 0     Homeless in the Last Year: No       Family History   Problem Relation Age of Onset    Kidney cancer Mother 70        RENAL CANCER    Hypertension Mother     Skin cancer Mother     Osteoporosis Mother     Osteoporotic fracture Mother         hip - age 92     Osteoporotic fracture Paternal Grandmother

## 2024-11-12 ENCOUNTER — HOSPITAL ENCOUNTER (OUTPATIENT)
Dept: BONE DENSITY | Facility: MEDICAL CENTER | Age: 79
Discharge: HOME | End: 2024-11-12
Payer: MEDICARE

## 2024-11-12 DIAGNOSIS — M81.0 AGE-RELATED OSTEOPOROSIS WITHOUT CURRENT PATHOLOGICAL FRACTURE: ICD-10-CM

## 2024-11-12 PROCEDURE — 77080 DXA BONE DENSITY AXIAL: CPT

## 2024-11-19 ENCOUNTER — OFFICE VISIT (OUTPATIENT)
Dept: RHEUMATOLOGY | Facility: CLINIC | Age: 79
End: 2024-11-19
Payer: MEDICARE

## 2024-11-19 ENCOUNTER — TELEPHONE (OUTPATIENT)
Age: 79
End: 2024-11-19

## 2024-11-19 VITALS
WEIGHT: 151 LBS | SYSTOLIC BLOOD PRESSURE: 124 MMHG | BODY MASS INDEX: 29.64 KG/M2 | HEIGHT: 60 IN | DIASTOLIC BLOOD PRESSURE: 72 MMHG

## 2024-11-19 DIAGNOSIS — Z79.899 HIGH RISK MEDICATION USE: ICD-10-CM

## 2024-11-19 DIAGNOSIS — M81.0 AGE-RELATED OSTEOPOROSIS WITHOUT CURRENT PATHOLOGICAL FRACTURE: Primary | ICD-10-CM

## 2024-11-19 PROCEDURE — 96372 THER/PROPH/DIAG INJ SC/IM: CPT | Performed by: INTERNAL MEDICINE

## 2024-11-19 PROCEDURE — 99214 OFFICE O/P EST MOD 30 MIN: CPT | Performed by: INTERNAL MEDICINE

## 2024-11-19 NOTE — PATIENT INSTRUCTIONS
Will review latest DEXA scan  Prolia injection given in clinic today; will continue every 6 months for now  Continue 1,200mg of calcium daily  Continue 1,000 units of Vit. D daily    Return to clinic in 6 months for Prolia only injection  Return to clinic in 1 year for Prolia + provider visit

## 2024-11-19 NOTE — PROGRESS NOTES
Assessment/Plan:    Violeta Hollis came into the [facility] today 11/19/24 to receive prolia injection.      Verbal consent obtained.  Consent given by: patient    patient states patient has been medically healthy with no underlining concerns/complications.      Violeta Hollis presents with no symptoms today.       All insturctions were reviewed with the patient.    If the patient should have any questions/concerns, advised patient to contacted [facility].       Subjective:     History provided by: patient    Patient ID: Violeta Hollis is a 79 y.o. female      Objective:    Vitals:    11/19/24 1232   Weight: 68.5 kg (151 lb)   Height: 5' (1.524 m)       Patient tolerated the injection well without any complications.  Injection site/s left arm.  Medication was provided by providers stock.    Patient signed consent form no   Patient signed ABN form no (If no patient is not a medicare patient).   Patient waited 15 minutes after injection yes (This only applies to patient's receiving first time injection).       Last Visit: Visit date not found  Next visit:Visit date not found

## 2024-11-19 NOTE — TELEPHONE ENCOUNTER
Patient called in to schedule her 6 month Prolia injections in May . I advise the patient she is schedule for 1year FU + Prolia but not just her 6 month FU as per  noted in her AVS . Please advise the patient on date and time for her nurse visit in 6 months . Thank you

## 2024-11-19 NOTE — PROGRESS NOTES
RHEUMATOLOGY FOLLOW-UP NOTE    Assessment and Plan:   Violeta Hollis is a 79 y.o.  female who presents for follow-up of osteoporosis (osteopenia with high FRAX score), doing well on Prolia injections. Would benefit from continuing.    Prolia injection given in clinic today; will continue every 6 months  Continue 1,200mg of calcium daily  Continue 1,000 units of Vit. D daily    Return to clinic in 6 months for Prolia only injection  Return to clinic in 1 year for Prolia + provider visit  Assessment & Plan  1. Osteoporosis.  She has been stable with no falls or fractures in the past year. She continues to take 1200 mg of calcium and 1000 units of vitamin D daily. She received her Prolia injection and will continue with Prolia indefinitely. If the latest DEXA scan results show improvement, a transition to Reclast will be considered. The results of the DEXA scan will be communicated via [employer]. If transitioning off Prolia, Reclast infusion will be scheduled at the closest infusion center.    2. Knee Pain.  She receives steroid injections every 3 months for knee pain, which provide relief for about 2 months. If the steroid injections become less effective, gel shots will be considered. She will continue to see her orthopedic specialist for these injections.    Follow-up  Patient is scheduled for a follow-up visit in 1 year.    Plan:  Diagnoses and all orders for this visit:    Age-related osteoporosis without current pathological fracture  -     denosumab (PROLIA) subcutaneous injection 60 mg    High risk medication use    High risk medication use - Prolia (denosumab) is a monoclonal antibody biologic medication that can rapidly utilize the body's calcium and make one susceptible to hypocalcemia. The medication also has a risk of osteonecrosis of the jaw in patients with exposed jaw bone; patient does not plan to get any invasive dental procedures in the near future.    Follow-up plan:   Return to clinic in 6 months  for Prolia only injection  Return to clinic in 1 year for Prolia + provider visit         Rheumatic Disease Summary      Chief Complaint  No chief complaint on file.      HPI  Violeta Hollis is a 79 y.o.  female who presents     History of Present Illness  The patient is a 79-year-old female who presents for a follow-up of her osteoporosis.    She reports no recent falls or fractures, with her last fall occurring in 2020 when she tripped over her dog, resulting in three broken ribs. She uses a cane for stability, particularly on uneven surfaces. Her current medication regimen includes 1200 mg of calcium and 1000 units of vitamin D daily, along with Prolia injections. She has previously been treated with Reclast and Fosamax but discontinued these due to adverse effects.    Additionally, she receives knee injections every three months, which provide relief for approximately two months. She is not experiencing any joint pain.    The following portions of the patient's history were reviewed and updated as appropriate: allergies, current medications, past family history, past medical history, past social history, past surgical history and problem list.    Review of Systems:   See HPI    Home Medications:    Current Outpatient Medications:     Calcium Carbonate-Vitamin D 600-200 MG-UNIT TABS, Take 1 tablet by mouth 2 (two) times a day, Disp: , Rfl:     Cholecalciferol (VITAMIN D3) 1000 units CAPS, Take by mouth daily  , Disp: , Rfl:     denosumab (PROLIA) 60 mg/mL, Inject under the skin every 6 (six) months  , Disp: , Rfl:     hydroCHLOROthiazide 25 mg tablet, take 1 tablet by mouth once daily, Disp: 90 tablet, Rfl: 1    losartan (COZAAR) 50 mg tablet, take 1 tablet by mouth once daily, Disp: 90 tablet, Rfl: 1    Multiple Vitamins-Minerals (DAILY MULTI PO), Take by mouth daily  , Disp: , Rfl:     potassium chloride (Klor-Con M10) 10 mEq tablet, take 1 tablet by mouth once daily, Disp: 90 tablet, Rfl: 1    ketorolac  (ACULAR) 0.5 % ophthalmic solution, INSTILL 1 DROP INTO SURGERY EYE 4 TIMES A DAY START RIGHT EYE ON 2/12/2024 (Patient not taking: Reported on 11/19/2024), Disp: , Rfl:     ofloxacin (OCUFLOX) 0.3 % ophthalmic solution, INSTILL 1 DROP INTO SURGERY EYE 4 TIMES A DAY START RIGHT EYE ON 2/12/2024 (Patient not taking: Reported on 11/19/2024), Disp: , Rfl:     prednisoLONE acetate (PRED FORTE) 1 % ophthalmic suspension, Instill 1 drop IN SURGERY EYE four times a day (BEGINNING DAY OF SURGERY, BUT AFTER PROCEDURE) (Patient not taking: Reported on 11/19/2024), Disp: , Rfl:     Current Facility-Administered Medications:     denosumab (PROLIA) subcutaneous injection 60 mg, 60 mg, Subcutaneous, Q6 Months, , 60 mg at 11/19/24 1300    ropivacaine (NAROPIN) injection 4 mL, 4 mL, Intra-articular, Titrated, , 4 mL at 10/09/24 1224    Objective:    Vitals:    11/19/24 1232   BP: 124/72   Weight: 68.5 kg (151 lb)   Height: 5' (1.524 m)       Physical Exam  Constitutional:       General: She is not in acute distress.  HENT:      Head: Normocephalic and atraumatic.   Eyes:      Conjunctiva/sclera: Conjunctivae normal.   Cardiovascular:      Rate and Rhythm: Normal rate and regular rhythm.      Heart sounds: S1 normal and S2 normal.      No friction rub.   Pulmonary:      Effort: Pulmonary effort is normal. No respiratory distress.      Breath sounds: Normal breath sounds. No wheezing, rhonchi or rales.   Musculoskeletal:      Cervical back: Neck supple.   Skin:     Coloration: Skin is not pale.   Neurological:      Mental Status: She is alert. Mental status is at baseline.   Psychiatric:         Mood and Affect: Mood normal.         Behavior: Behavior normal.       Physical Exam      Reviewed labs and imaging.    Imaging:   DEXA Scan 11/12/24  RESULTS:  LUMBAR SPINE: L3-L4:  BMD 0.947 gm/cm2  T-score -1.4 and 4.2% less than 2022, statistically significant change.  Z-score  The changes are significant at the 95% confidence level. The  LSC is 0.019 g/sq cm.     LEFT TOTAL HIP:  BMD 0.862 gm/cm2  T-score -0.7  Z-score     LEFT FEMORAL NECK:  BMD 0.658 gm/cm2  T-score -1.7 and 0.1% less than 2022.  Z-score        IMPRESSION:  1. Based on the WHO classification, this study identifies a diagnosis of low bone mass, notable at the spine and femoral neck areas but the patient may be considered at  risk for fracture as evidenced by the increased fracture risks noted on the FRAX   score.     Continuation of treatment would seem appropriate at this time.     2. A daily intake of calcium of at least 1200 mg and vitamin D, 800-1000 IU, as well as weight bearing and muscle strengthening exercise, fall prevention and avoidance of tobacco and excessive alcohol intake as basic preventive measures are recommended.     3. Repeat DXA scan on the same equipment in 18-24 months as clinically indicated.        The 10 year risk of hip fracture is 14%, with the 10 year risk of major osteoporotic fracture being 23%, as calculated by the WHO fracture risk assessment tool (FRAX).  The current NOF guidelines recommend treating patients with FRAX 10 year risk score   of >3% for hip fracture and >20% for major osteoporotic fracture.    Labs:   Orders Only on 06/11/2024   Component Date Value Ref Range Status    Total Cholesterol 06/11/2024 166  <200 mg/dL Final    HDL 06/11/2024 67  > OR = 50 mg/dL Final    Triglycerides 06/11/2024 61  <150 mg/dL Final    LDL Calculated 06/11/2024 85  mg/dL (calc) Final    Comment: Reference range: <100     Desirable range <100 mg/dL for primary prevention;    <70 mg/dL for patients with CHD or diabetic patients   with > or = 2 CHD risk factors.     LDL-C is now calculated using the Afshin-Scarlett   calculation, which is a validated novel method providing   better accuracy than the Friedewald equation in the   estimation of LDL-C.   Afshin SS et al. ALUREN. 2013;310(19): 7992-2355   (http://education.[employer].fr.[employer]/faq/SOL574)      Chol  HDLC Ratio 06/11/2024 2.5  <5.0 (calc) Final    Non-HDL Cholesterol 06/11/2024 99  <130 mg/dL (calc) Final    Comment: For patients with diabetes plus 1 major ASCVD risk   factor, treating to a non-HDL-C goal of <100 mg/dL   (LDL-C of <70 mg/dL) is considered a therapeutic   option.      Creatinine, Urine 06/11/2024 145  20 - 275 mg/dL Final    Albumin,U,Random 06/11/2024 2.1  See Note: mg/dL Final    Comment: Reference Range:    Reference Range  Not established      Microalb/Creat Ratio 06/11/2024 14  <30 mg/g creat Final    Comment:    The ADA defines abnormalities in albumin  excretion as follows:     Albuminuria Category        Result (mg/g creatinine)     Normal to Mildly increased   <30  Moderately increased            Severely increased           > OR = 300     The ADA recommends that at least two of three  specimens collected within a 3-6 month period be  abnormal before considering a patient to be  within a diagnostic category.      Glucose, Random 06/11/2024 113 (H)  65 - 99 mg/dL Final    Comment:               Fasting reference interval     For someone without known diabetes, a glucose value  between 100 and 125 mg/dL is consistent with  prediabetes and should be confirmed with a  follow-up test.         BUN 06/11/2024 19  7 - 25 mg/dL Final    Creatinine 06/11/2024 0.86  0.60 - 1.00 mg/dL Final    eGFR 06/11/2024 69  > OR = 60 mL/min/1.73m2 Final    SL AMB BUN/CREATININE RATIO 06/11/2024 SEE NOTE:  6 - 22 (calc) Final    Comment:    Not Reported: BUN and Creatinine are within     reference range.            Sodium 06/11/2024 137  135 - 146 mmol/L Final    Potassium 06/11/2024 3.6  3.5 - 5.3 mmol/L Final    Chloride 06/11/2024 104  98 - 110 mmol/L Final    CO2 06/11/2024 25  20 - 32 mmol/L Final    Calcium 06/11/2024 10.3  8.6 - 10.4 mg/dL Final    Protein, Total 06/11/2024 6.6  6.1 - 8.1 g/dL Final    Albumin 06/11/2024 3.4 (L)  3.6 - 5.1 g/dL Final    Globulin 06/11/2024 3.2  1.9 - 3.7 g/dL  (calc) Final    Albumin/Globulin Ratio 06/11/2024 1.1  1.0 - 2.5 (calc) Final    TOTAL BILIRUBIN 06/11/2024 2.0 (H)  0.2 - 1.2 mg/dL Final    Alkaline Phosphatase 06/11/2024 76  37 - 153 U/L Final    AST 06/11/2024 39 (H)  10 - 35 U/L Final    ALT 06/11/2024 17  6 - 29 U/L Final    White Blood Cell Count 06/11/2024 3.9  3.8 - 10.8 Thousand/uL Final    Red Blood Cell Count 06/11/2024 3.41 (L)  3.80 - 5.10 Million/uL Final    Hemoglobin 06/11/2024 11.9  11.7 - 15.5 g/dL Final    HCT 06/11/2024 34.8 (L)  35.0 - 45.0 % Final    MCV 06/11/2024 102.1 (H)  80.0 - 100.0 fL Final    MCH 06/11/2024 34.9 (H)  27.0 - 33.0 pg Final    MCHC 06/11/2024 34.2  32.0 - 36.0 g/dL Final    RDW 06/11/2024 12.7  11.0 - 15.0 % Final    Platelet Count 06/11/2024 118 (L)  140 - 400 Thousand/uL Final    SL AMB MPV 06/11/2024 10.6  7.5 - 12.5 fL Final    Neutrophils (Absolute) 06/11/2024 1,977  1,500 - 7,800 cells/uL Final    Lymphocytes (Absolute) 06/11/2024 1,392  850 - 3,900 cells/uL Final    Monocytes (Absolute) 06/11/2024 289  200 - 950 cells/uL Final    Eosinophils (Absolute) 06/11/2024 211  15 - 500 cells/uL Final    Basophils ABS 06/11/2024 31  0 - 200 cells/uL Final    Neutrophils 06/11/2024 50.7  % Final    Lymphocytes 06/11/2024 35.7  % Final    Monocytes 06/11/2024 7.4  % Final    Eosinophils 06/11/2024 5.4  % Final    Basophils PCT 06/11/2024 0.8  % Final    TSH W/RFX TO FREE T4 06/11/2024 1.35  0.40 - 4.50 mIU/L Final    Vitamin D, 25-Hydroxy, Serum 06/11/2024 69  30 - 100 ng/mL Final    Comment: Vitamin D Status         25-OH Vitamin D:     Deficiency:                    <20 ng/mL  Insufficiency:             20 - 29 ng/mL  Optimal:                 > or = 30 ng/mL     For 25-OH Vitamin D testing on patients on   D2-supplementation and patients for whom quantitation   of D2 and D3 fractions is required, the [facility]  25-OH VIT D, (D2,D3), LC/MS/MS is recommended: order   code 70143 (patients >2yrs).     See Note 1     Note  1     For additional information, please refer to   http://education.[employer]/faq/HHL309   (This link is being provided for informational/  educational purposes only.)      Hemoglobin A1C 06/11/2024 7.1 (H)  <5.7 % of total Hgb Final    Comment: For someone without known diabetes, a hemoglobin A1c  value of 6.5% or greater indicates that they may have   diabetes and this should be confirmed with a follow-up   test.     For someone with known diabetes, a value <7% indicates   that their diabetes is well controlled and a value   greater than or equal to 7% indicates suboptimal   control. A1c targets should be individualized based on   duration of diabetes, age, comorbid conditions, and   other considerations.     Currently, no consensus exists regarding use of  hemoglobin A1c for diagnosis of diabetes for children.

## 2024-11-20 NOTE — TELEPHONE ENCOUNTER
Called patient and advised their nurse visit was in may on the 20th at 11:30 patient relayed this has already reflected in their MyChart and are aware, all questions answered

## 2024-11-23 ENCOUNTER — RESULTS FOLLOW-UP (OUTPATIENT)
Dept: FAMILY MEDICINE CLINIC | Facility: CLINIC | Age: 79
End: 2024-11-23

## 2024-12-12 ENCOUNTER — RA CDI HCC (OUTPATIENT)
Dept: OTHER | Facility: HOSPITAL | Age: 79
End: 2024-12-12

## 2024-12-18 ENCOUNTER — TELEPHONE (OUTPATIENT)
Dept: ADMINISTRATIVE | Facility: OTHER | Age: 79
End: 2024-12-18

## 2024-12-18 NOTE — TELEPHONE ENCOUNTER
12/18/24 10:45 AM    Patient contacted to bring Advance Directive, POLST, or Living Will document to next scheduled pcp visit.[facility] Department left message.    Thank you.  Conrado Tripathi MA  [facility]

## 2024-12-20 ENCOUNTER — OFFICE VISIT (OUTPATIENT)
Dept: FAMILY MEDICINE CLINIC | Facility: CLINIC | Age: 79
End: 2024-12-20
Payer: MEDICARE

## 2024-12-20 VITALS
TEMPERATURE: 97.2 F | HEART RATE: 88 BPM | HEIGHT: 60 IN | OXYGEN SATURATION: 99 % | WEIGHT: 154.2 LBS | DIASTOLIC BLOOD PRESSURE: 76 MMHG | BODY MASS INDEX: 30.27 KG/M2 | SYSTOLIC BLOOD PRESSURE: 130 MMHG

## 2024-12-20 DIAGNOSIS — E87.6 HYPOKALEMIA: ICD-10-CM

## 2024-12-20 DIAGNOSIS — E66.811 OBESITY (BMI 30.0-34.9): ICD-10-CM

## 2024-12-20 DIAGNOSIS — M81.0 AGE-RELATED OSTEOPOROSIS WITHOUT CURRENT PATHOLOGICAL FRACTURE: ICD-10-CM

## 2024-12-20 DIAGNOSIS — E11.9 TYPE 2 DIABETES MELLITUS WITHOUT COMPLICATION, WITHOUT LONG-TERM CURRENT USE OF INSULIN (HCC): Primary | ICD-10-CM

## 2024-12-20 DIAGNOSIS — I10 ESSENTIAL HYPERTENSION: ICD-10-CM

## 2024-12-20 LAB — SL AMB POCT HEMOGLOBIN AIC: 10.3 (ref ?–6.5)

## 2024-12-20 PROCEDURE — 99214 OFFICE O/P EST MOD 30 MIN: CPT | Performed by: PHYSICIAN ASSISTANT

## 2024-12-20 PROCEDURE — 83036 HEMOGLOBIN GLYCOSYLATED A1C: CPT | Performed by: PHYSICIAN ASSISTANT

## 2024-12-20 RX ORDER — LOSARTAN POTASSIUM 50 MG/1
50 TABLET ORAL DAILY
Qty: 90 TABLET | Refills: 1 | Status: SHIPPED | OUTPATIENT
Start: 2024-12-20

## 2024-12-20 RX ORDER — POTASSIUM CHLORIDE 750 MG/1
10 TABLET, EXTENDED RELEASE ORAL DAILY
Qty: 90 TABLET | Refills: 1 | Status: SHIPPED | OUTPATIENT
Start: 2024-12-20

## 2024-12-20 RX ORDER — HYDROCHLOROTHIAZIDE 25 MG/1
25 TABLET ORAL DAILY
Qty: 90 TABLET | Refills: 1 | Status: SHIPPED | OUTPATIENT
Start: 2024-12-20

## 2024-12-20 NOTE — ASSESSMENT & PLAN NOTE
BMI Counseling: Body mass index is 30.12 kg/m². The BMI is above normal. Nutrition recommendations include decreasing overall calorie intake and moderation in carbohydrate intake. Exercise recommendations include exercising 3-5 times per week.      Orders:    CBC and differential; Future    Comprehensive metabolic panel; Future    Hemoglobin A1C; Future    Lipid Panel with Direct LDL reflex; Future    TSH, 3rd generation with Free T4 reflex; Future    Vitamin D 25 hydroxy; Future    Albumin / creatinine urine ratio; Future

## 2024-12-20 NOTE — ASSESSMENT & PLAN NOTE
Controlled in June with a level of 3.6.  Continue Klor-Con 10 mill equivalents daily.  Recheck prior to next visit.  Orders:    CBC and differential; Future    Comprehensive metabolic panel; Future    Hemoglobin A1C; Future    Lipid Panel with Direct LDL reflex; Future    TSH, 3rd generation with Free T4 reflex; Future    Vitamin D 25 hydroxy; Future    Albumin / creatinine urine ratio; Future

## 2024-12-20 NOTE — ASSESSMENT & PLAN NOTE
Controlled.  Continue losartan 50 mg daily and HCTZ 25 mg daily.  Orders:    CBC and differential; Future    Comprehensive metabolic panel; Future    Hemoglobin A1C; Future    Lipid Panel with Direct LDL reflex; Future    TSH, 3rd generation with Free T4 reflex; Future    Vitamin D 25 hydroxy; Future    hydroCHLOROthiazide 25 mg tablet; Take 1 tablet (25 mg total) by mouth daily    losartan (COZAAR) 50 mg tablet; Take 1 tablet (50 mg total) by mouth daily    potassium chloride (Klor-Con M10) 10 mEq tablet; Take 1 tablet (10 mEq total) by mouth daily    Albumin / creatinine urine ratio; Future

## 2024-12-20 NOTE — PROGRESS NOTES
Name: Violeta Hollis      : 1945      MRN: [id number]  Encounter Provider: Venessa Kay PA-C  Encounter Date: 2024   Encounter department: [facility]  :  Assessment & Plan  Type 2 diabetes mellitus without complication, without long-term current use of insulin (HCC)  Uncontrolled.  We reviewed that significantly worsened A1c noted today.  We reviewed some improvements that can be made with diet, but patient was strongly encouraged to start medication to help with lowering her blood sugar average.  Patient refused to start any medication to help with her blood sugar control.  She also declined referral to diabetes educator.  Patient states that she would like to work on diet herself and recheck in 3 months instead.  We reviewed negative impacts on her body of leaving her blood sugar elevated.  She also declined any glucometer/testing supplies.  Patient was asked to update labs as ordered about a week prior to her next visit.  Lab Results   Component Value Date    HGBA1C 10.3 (A) 2024       Orders:    POCT hemoglobin A1c    CBC and differential; Future    Comprehensive metabolic panel; Future    Hemoglobin A1C; Future    Lipid Panel with Direct LDL reflex; Future    TSH, 3rd generation with Free T4 reflex; Future    Vitamin D 25 hydroxy; Future    Albumin / creatinine urine ratio; Future    Essential hypertension  Controlled.  Continue losartan 50 mg daily and HCTZ 25 mg daily.  Orders:    CBC and differential; Future    Comprehensive metabolic panel; Future    Hemoglobin A1C; Future    Lipid Panel with Direct LDL reflex; Future    TSH, 3rd generation with Free T4 reflex; Future    Vitamin D 25 hydroxy; Future    hydroCHLOROthiazide 25 mg tablet; Take 1 tablet (25 mg total) by mouth daily    losartan (COZAAR) 50 mg tablet; Take 1 tablet (50 mg total) by mouth daily    potassium chloride (Klor-Con M10) 10 mEq tablet; Take 1 tablet (10 mEq total) by mouth daily     Albumin / creatinine urine ratio; Future    Hypokalemia  Controlled in June with a level of 3.6.  Continue Klor-Con 10 mill equivalents daily.  Recheck prior to next visit.  Orders:    CBC and differential; Future    Comprehensive metabolic panel; Future    Hemoglobin A1C; Future    Lipid Panel with Direct LDL reflex; Future    TSH, 3rd generation with Free T4 reflex; Future    Vitamin D 25 hydroxy; Future    Albumin / creatinine urine ratio; Future    Obesity (BMI 30.0-34.9)  BMI Counseling: Body mass index is 30.12 kg/m². The BMI is above normal. Nutrition recommendations include decreasing overall calorie intake and moderation in carbohydrate intake. Exercise recommendations include exercising 3-5 times per week.      Orders:    CBC and differential; Future    Comprehensive metabolic panel; Future    Hemoglobin A1C; Future    Lipid Panel with Direct LDL reflex; Future    TSH, 3rd generation with Free T4 reflex; Future    Vitamin D 25 hydroxy; Future    Albumin / creatinine urine ratio; Future    Body mass index (BMI) 30.0-30.9, adult    Orders:    Vitamin D 25 hydroxy; Future    Age-related osteoporosis without current pathological fracture  Patient continues to treat with Prolia through rheumatology.  She continues also on calcium and vitamin D supplementation.  Will continue to monitor.              History of Present Illness     DM 2 - no longer controlled with diet - A1c is 10.3% - notes that she has cereal with fruit or muffin, then lunch of about 1/2 sandwich with chips and dinner of Lean Cuisine - has water and unsweetened iced tea  - has 2 small chocolate squares daily     Hypertension - on losartan 50 mg daily and HCTZ 25 mg daily - usually about 120/76 at home     Osteoporosis - continues Prolia through rheumatology plus calcium and vitamin D supplementation - last DEXA scan was in November 2024    Hypokalemia - on KCl 10 mill equivalents daily - level in June was 3.6      Review of Systems    Constitutional:  Negative for chills and fever.   Respiratory:  Negative for shortness of breath.    Cardiovascular:  Negative for chest pain, palpitations and leg swelling.   Neurological:  Negative for dizziness and headaches.       Objective   /76   Pulse 88   Temp (!) 97.2 °F (36.2 °C)   Ht 5' (1.524 m)   Wt 69.9 kg (154 lb 3.2 oz)   SpO2 99%   BMI 30.12 kg/m²      Physical Exam  Vitals reviewed.   Constitutional:       General: She is not in acute distress.     Appearance: Normal appearance. She is well-developed. She is not ill-appearing.   HENT:      Head: Normocephalic and atraumatic.   Neck:      Thyroid: No thyromegaly.      Vascular: No carotid bruit.   Cardiovascular:      Rate and Rhythm: Normal rate and regular rhythm.      Pulses: Normal pulses.      Heart sounds: Normal heart sounds. No murmur heard.  Pulmonary:      Effort: Pulmonary effort is normal.      Breath sounds: Normal breath sounds. No wheezing, rhonchi or rales.   Musculoskeletal:      Cervical back: Neck supple.      Right lower leg: No edema.      Left lower leg: No edema.   Lymphadenopathy:      Cervical: No cervical adenopathy.   Skin:     General: Skin is warm and dry.   Neurological:      Mental Status: She is alert.   Psychiatric:         Mood and Affect: Mood normal.         Behavior: Behavior normal.         Thought Content: Thought content normal.         Judgment: Judgment normal.

## 2024-12-20 NOTE — ASSESSMENT & PLAN NOTE
Uncontrolled.  We reviewed that significantly worsened A1c noted today.  We reviewed some improvements that can be made with diet, but patient was strongly encouraged to start medication to help with lowering her blood sugar average.  Patient refused to start any medication to help with her blood sugar control.  She also declined referral to diabetes educator.  Patient states that she would like to work on diet herself and recheck in 3 months instead.  We reviewed negative impacts on her body of leaving her blood sugar elevated.  She also declined any glucometer/testing supplies.  Patient was asked to update labs as ordered about a week prior to her next visit.  Lab Results   Component Value Date    HGBA1C 10.3 (A) 12/20/2024       Orders:    POCT hemoglobin A1c    CBC and differential; Future    Comprehensive metabolic panel; Future    Hemoglobin A1C; Future    Lipid Panel with Direct LDL reflex; Future    TSH, 3rd generation with Free T4 reflex; Future    Vitamin D 25 hydroxy; Future    Albumin / creatinine urine ratio; Future

## 2024-12-24 ENCOUNTER — TELEPHONE (OUTPATIENT)
Dept: ADMINISTRATIVE | Facility: OTHER | Age: 79
End: 2024-12-24

## 2024-12-24 NOTE — TELEPHONE ENCOUNTER
Upon review of the In Basket request we were able to locate, review, and update the patient chart as requested for Diabetic Eye Exam.    Any additional questions or concerns should be emailed to the Practice Liaisons via the appropriate education email address, please do not reply via In Basket.    Thank you  Anastacia Canada MA   [facility]

## 2024-12-24 NOTE — TELEPHONE ENCOUNTER
----- Message from Claudia FARRAR sent at 12/23/2024  4:35 PM EST -----  Regarding: DM EYE  12/23/24 4:35 PM    Britt, our patient Violeta Hollis has had Diabetic Eye Exam completed/performed. Please assist in updating the patient chart by pulling the Care Everywhere (CE) document. and pulling the document from the Media Tab. The date of service is 12.18.24. scanned document 12.20.24     Thank you,  Claudia Gaona MA  [facility]

## 2024-12-30 ENCOUNTER — TELEPHONE (OUTPATIENT)
Age: 79
End: 2024-12-30

## 2024-12-30 NOTE — ASSESSMENT & PLAN NOTE
Patient continues to treat with Prolia through rheumatology.  She continues also on calcium and vitamin D supplementation.  Will continue to monitor.

## 2025-01-09 ENCOUNTER — OFFICE VISIT (OUTPATIENT)
Dept: OBGYN CLINIC | Facility: MEDICAL CENTER | Age: 80
End: 2025-01-09
Payer: MEDICARE

## 2025-01-09 VITALS — WEIGHT: 154 LBS | HEIGHT: 60 IN | BODY MASS INDEX: 30.23 KG/M2

## 2025-01-09 DIAGNOSIS — G89.29 CHRONIC PAIN OF RIGHT KNEE: Primary | ICD-10-CM

## 2025-01-09 DIAGNOSIS — M17.11 PRIMARY OSTEOARTHRITIS OF RIGHT KNEE: ICD-10-CM

## 2025-01-09 DIAGNOSIS — M25.561 CHRONIC PAIN OF RIGHT KNEE: Primary | ICD-10-CM

## 2025-01-09 PROCEDURE — 99212 OFFICE O/P EST SF 10 MIN: CPT | Performed by: EMERGENCY MEDICINE

## 2025-01-09 NOTE — PROGRESS NOTES
Assessment/Plan:    Diagnoses and all orders for this visit:    Chronic pain of right knee    Primary osteoarthritis of right knee    Violeta is doing well overall and would like to hold off on repeat CSI at this time.      Return in about 1 month (around 2/9/2025).      Subjective:   Patient ID: Violeta Hollis is a 79 y.o. female.    Violeta returns with mild pain symptoms of the knee typically worse in the morning    Previous note:  Violeta returns for chronic right knee pain/OA requesting CSI  Received about 2 months of benefit from previous CSI        Review of Systems    The following portions of the patient's chart were reviewed and updated as appropriate:   Allergy:  No Known Allergies    Medications:    Current Outpatient Medications:     Calcium Carbonate-Vitamin D 600-200 MG-UNIT TABS, Take 1 tablet by mouth 2 (two) times a day, Disp: , Rfl:     Cholecalciferol (VITAMIN D3) 1000 units CAPS, Take by mouth daily  , Disp: , Rfl:     denosumab (PROLIA) 60 mg/mL, Inject under the skin every 6 (six) months  , Disp: , Rfl:     hydroCHLOROthiazide 25 mg tablet, Take 1 tablet (25 mg total) by mouth daily, Disp: 90 tablet, Rfl: 1    losartan (COZAAR) 50 mg tablet, Take 1 tablet (50 mg total) by mouth daily, Disp: 90 tablet, Rfl: 1    Multiple Vitamins-Minerals (DAILY MULTI PO), Take by mouth daily  , Disp: , Rfl:     potassium chloride (Klor-Con M10) 10 mEq tablet, Take 1 tablet (10 mEq total) by mouth daily, Disp: 90 tablet, Rfl: 1    Current Facility-Administered Medications:     denosumab (PROLIA) subcutaneous injection 60 mg, 60 mg, Subcutaneous, Q6 Months, , 60 mg at 11/19/24 1300    ropivacaine (NAROPIN) injection 4 mL, 4 mL, Intra-articular, Titrated, , 4 mL at 10/09/24 1224    Patient Active Problem List   Diagnosis    Dietary calcium deficiency    Obesity (BMI 30.0-34.9)    Osteoporosis    Hx of colonic polyps    Heart murmur    Type 2 diabetes mellitus without complication, without long-term current use of  insulin (HCC)    Thrombocytopenia (HCC)    Essential hypertension    Hypokalemia    Low hematocrit    Leg swelling    Primary generalized (osteo)arthritis    Lumbar spondylosis    Acute left-sided low back pain without sciatica    Acute pain of right knee    Chronic pain of right knee       Objective:  Ht 5' (1.524 m)   Wt 69.9 kg (154 lb)   BMI 30.08 kg/m²     Right Knee Exam     Other   Erythema: absent            Physical Exam      Neurologic Exam    Procedures    I have personally reviewed the written report of the pertinent studies.             Past Medical History:   Diagnosis Date    Chicken pox     Diabetes mellitus (HCC)     Fall 2019    Hypertension     Kidney stones     Malignant neoplasm of skin     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    Osteoarthritis     Osteoporosis        Past Surgical History:   Procedure Laterality Date    CATARACT EXTRACTION  2024    Pt reported    COLONOSCOPY      COLONOSCOPY      COMPLETE; DUE 3 YRS    COLONOSCOPY  2018    polyp x1    DILATION AND CURETTAGE OF UTERUS  2013    THICKENED ENDO ON USG, CVX STENOSIS, PATH: BENIGN POLYP FRAGMENTS AND INACTIVE ENDOMETRIUM. NO NEOPLASIA ; IMPRESSION: 10/27/14; [facility]    HYSTEROSCOPY      MOHS SURGERY  2020    BCC on face    TONSILLECTOMY         Social History     Socioeconomic History    Marital status: Single     Spouse name: Not on file    Number of children: Not on file    Years of education: Not on file    Highest education level: Not on file   Occupational History    Occupation: [employer]   Tobacco Use    Smoking status: Former     Current packs/day: 0.00     Average packs/day: 0.5 packs/day for 5.0 years (2.5 ttl pk-yrs)     Types: Cigarettes     Start date: 1965     Quit date: 1970     Years since quittin.0     Passive exposure: Never    Smokeless tobacco: Never    Tobacco comments:     STARTED AT AGE 18. STOPPED AT AGE 23.    Vaping Use    Vaping status: Never Used    Substance and Sexual Activity    Alcohol use: Not Currently     Comment: Wine for Special Occassion    Drug use: Never    Sexual activity: Not on file   Other Topics Concern    Not on file   Social History Narrative    EXERCISES MODERATELY LESS THAN 3 TIMES WEEK     Social Drivers of Health     Financial Resource Strain: Low Risk  (5/15/2023)    Overall Financial Resource Strain (CARDIA)     Difficulty of Paying Living Expenses: Not hard at all   Food Insecurity: No Food Insecurity (6/19/2024)    Nursing - Inadequate Food Risk Classification     Worried About Running Out of Food in the Last Year: Never true     Ran Out of Food in the Last Year: Never true     Ran Out of Food in the Last Year: Not on file   Transportation Needs: No Transportation Needs (6/19/2024)    PRAPARE - Transportation     Lack of Transportation (Medical): No     Lack of Transportation (Non-Medical): No   Physical Activity: Not on file   Stress: Not on file   Social Connections: Unknown (6/18/2024)    Received from [employer]    Social Connections     How often do you feel lonely or isolated from those around you? (Adult - for ages 18 years and over): Not on file   Intimate Partner Violence: Not on file   Housing Stability: Low Risk  (6/19/2024)    Housing Stability Vital Sign     Unable to Pay for Housing in the Last Year: No     Number of Times Moved in the Last Year: 0     Homeless in the Last Year: No       Family History   Problem Relation Age of Onset    Kidney cancer Mother 70        RENAL CANCER    Hypertension Mother     Skin cancer Mother     Osteoporosis Mother     Osteoporotic fracture Mother         hip - age 92     Osteoporotic fracture Paternal Grandmother

## 2025-02-12 ENCOUNTER — OFFICE VISIT (OUTPATIENT)
Dept: OBGYN CLINIC | Facility: MEDICAL CENTER | Age: 80
End: 2025-02-12
Payer: MEDICARE

## 2025-02-12 VITALS — BODY MASS INDEX: 30.23 KG/M2 | HEIGHT: 60 IN | WEIGHT: 154 LBS

## 2025-02-12 DIAGNOSIS — M25.561 CHRONIC PAIN OF RIGHT KNEE: Primary | ICD-10-CM

## 2025-02-12 DIAGNOSIS — G89.29 CHRONIC PAIN OF RIGHT KNEE: Primary | ICD-10-CM

## 2025-02-12 DIAGNOSIS — M17.11 PRIMARY OSTEOARTHRITIS OF RIGHT KNEE: ICD-10-CM

## 2025-02-12 PROCEDURE — 20610 DRAIN/INJ JOINT/BURSA W/O US: CPT | Performed by: EMERGENCY MEDICINE

## 2025-02-12 PROCEDURE — 99212 OFFICE O/P EST SF 10 MIN: CPT | Performed by: EMERGENCY MEDICINE

## 2025-02-12 RX ORDER — TRIAMCINOLONE ACETONIDE 40 MG/ML
40 INJECTION, SUSPENSION INTRA-ARTICULAR; INTRAMUSCULAR
Status: COMPLETED | OUTPATIENT
Start: 2025-02-12 | End: 2025-02-12

## 2025-02-12 RX ORDER — ROPIVACAINE HYDROCHLORIDE 2 MG/ML
4 INJECTION, SOLUTION EPIDURAL; INFILTRATION; PERINEURAL
Status: COMPLETED | OUTPATIENT
Start: 2025-02-12 | End: 2025-02-12

## 2025-02-12 RX ADMIN — ROPIVACAINE HYDROCHLORIDE 4 ML: 2 INJECTION, SOLUTION EPIDURAL; INFILTRATION; PERINEURAL at 13:45

## 2025-02-12 RX ADMIN — TRIAMCINOLONE ACETONIDE 40 MG: 40 INJECTION, SUSPENSION INTRA-ARTICULAR; INTRAMUSCULAR at 13:45

## 2025-02-12 NOTE — PROGRESS NOTES
Assessment/Plan:    Diagnoses and all orders for this visit:    Chronic pain of right knee  -     Large joint arthrocentesis: R knee    Primary osteoarthritis of right knee  -     Large joint arthrocentesis: R knee    Repeat CSI Right knee  Discussed [facility]    Return in about 4 months (around 6/12/2025).      Subjective:   Patient ID: Violeta Hollis is a 80 y.o. female.    Violeta returns with daughter for chronic right knee pain requesting CSI.  Last evaluation 1/9/2025 Violeta decided to hold off on repeat CSI at that time.        Review of Systems    The following portions of the patient's chart were reviewed and updated as appropriate:   Allergy:  No Known Allergies    Medications:    Current Outpatient Medications:     Calcium Carbonate-Vitamin D 600-200 MG-UNIT TABS, Take 1 tablet by mouth 2 (two) times a day, Disp: , Rfl:     Cholecalciferol (VITAMIN D3) 1000 units CAPS, Take by mouth daily  , Disp: , Rfl:     denosumab (PROLIA) 60 mg/mL, Inject under the skin every 6 (six) months  , Disp: , Rfl:     hydroCHLOROthiazide 25 mg tablet, Take 1 tablet (25 mg total) by mouth daily, Disp: 90 tablet, Rfl: 1    losartan (COZAAR) 50 mg tablet, Take 1 tablet (50 mg total) by mouth daily, Disp: 90 tablet, Rfl: 1    Multiple Vitamins-Minerals (DAILY MULTI PO), Take by mouth daily  , Disp: , Rfl:     potassium chloride (Klor-Con M10) 10 mEq tablet, Take 1 tablet (10 mEq total) by mouth daily, Disp: 90 tablet, Rfl: 1    Current Facility-Administered Medications:     denosumab (PROLIA) subcutaneous injection 60 mg, 60 mg, Subcutaneous, Q6 Months, , 60 mg at 11/19/24 1300    ropivacaine (NAROPIN) injection 4 mL, 4 mL, Intra-articular, Titrated, , 4 mL at 10/09/24 1224    Patient Active Problem List   Diagnosis    Dietary calcium deficiency    Obesity (BMI 30.0-34.9)    Osteoporosis    Hx of colonic polyps    Heart murmur    Type 2 diabetes mellitus without complication, without long-term current use of insulin ([facility])     "Thrombocytopenia (HCC)    Essential hypertension    Hypokalemia    Low hematocrit    Leg swelling    Primary generalized (osteo)arthritis    Lumbar spondylosis    Acute left-sided low back pain without sciatica    Acute pain of right knee    Chronic pain of right knee       Objective:  Ht 5' (1.524 m)   Wt 69.9 kg (154 lb)   BMI 30.08 kg/m²     Right Knee Exam     Other   Erythema: absent            Physical Exam      Neurologic Exam    Large joint arthrocentesis: R knee  Universal Protocol:  Consent: Verbal consent obtained.  Risks and benefits: risks, benefits and alternatives were discussed  Consent given by: patient  Time out: Immediately prior to procedure a \"time out\" was called to verify the correct patient, procedure, equipment, support staff and site/side marked as required.  Timeout called at: 2/12/2025 1:55 PM.  Patient understanding: patient states understanding of the procedure being performed  Test results: test results available and properly labeled  Site marked: the operative site was marked  Patient identity confirmed: verbally with patient  Supporting Documentation  Indications: pain   Procedure Details  Location: knee - R knee  Preparation: Patient was prepped and draped in the usual sterile fashion  Needle size: 22 G  Ultrasound guidance: no  Approach: anterolateral  Medications administered: 40 mg triamcinolone acetonide 40 mg/mL; 4 mL ropivacaine 0.2 %    Patient tolerance: patient tolerated the procedure well with no immediate complications  Dressing:  Sterile dressing applied    No erythema of knee(s)          I have personally reviewed the written report of the pertinent studies.             Past Medical History:   Diagnosis Date    Chicken pox     Diabetes mellitus (HCC)     Fall 05/01/2019    Hypertension     Kidney stones     Malignant neoplasm of skin     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    Osteoarthritis     Osteoporosis        Past Surgical History:   Procedure Laterality Date    CATARACT " EXTRACTION  2024    Pt reported    COLONOSCOPY  2018    COLONOSCOPY  2015    COMPLETE; DUE 3 YRS    COLONOSCOPY  2018    polyp x1    DILATION AND CURETTAGE OF UTERUS  2013    THICKENED ENDO ON USG, CVX STENOSIS, PATH: BENIGN POLYP FRAGMENTS AND INACTIVE ENDOMETRIUM. NO NEOPLASIA ; IMPRESSION: 10/27/14; [facility]    HYSTEROSCOPY      MOHS SURGERY  2020    BCC on face    TONSILLECTOMY         Social History     Socioeconomic History    Marital status: Single     Spouse name: Not on file    Number of children: Not on file    Years of education: Not on file    Highest education level: Not on file   Occupational History    Occupation: [employer]   Tobacco Use    Smoking status: Former     Current packs/day: 0.00     Average packs/day: 0.5 packs/day for 5.0 years (2.5 ttl pk-yrs)     Types: Cigarettes     Start date: 1965     Quit date: 1970     Years since quittin.1     Passive exposure: Never    Smokeless tobacco: Never    Tobacco comments:     STARTED AT AGE 18. STOPPED AT AGE 23.    Vaping Use    Vaping status: Never Used   Substance and Sexual Activity    Alcohol use: Not Currently     Comment: Wine for Special Occassion    Drug use: Never    Sexual activity: Not on file   Other Topics Concern    Not on file   Social History Narrative    EXERCISES MODERATELY LESS THAN 3 TIMES WEEK     Social Drivers of Health     Financial Resource Strain: Low Risk  (5/15/2023)    Overall Financial Resource Strain (CARDIA)     Difficulty of Paying Living Expenses: Not hard at all   Food Insecurity: No Food Insecurity (2024)    Nursing - Inadequate Food Risk Classification     Worried About Running Out of Food in the Last Year: Never true     Ran Out of Food in the Last Year: Never true     Ran Out of Food in the Last Year: Not on file   Transportation Needs: No Transportation Needs (2024)    PRAPARE - Transportation     Lack of Transportation (Medical): No     Lack of  Transportation (Non-Medical): No   Physical Activity: Not on file   Stress: Not on file   Social Connections: Unknown (6/18/2024)    Received from [employer]    Social Connections     How often do you feel lonely or isolated from those around you? (Adult - for ages 18 years and over): Not on file   Intimate Partner Violence: Not on file   Housing Stability: Low Risk  (6/19/2024)    Housing Stability Vital Sign     Unable to Pay for Housing in the Last Year: No     Number of Times Moved in the Last Year: 0     Homeless in the Last Year: No       Family History   Problem Relation Age of Onset    Kidney cancer Mother 70        RENAL CANCER    Hypertension Mother     Skin cancer Mother     Osteoporosis Mother     Osteoporotic fracture Mother         hip - age 92     Osteoporotic fracture Paternal Grandmother

## 2025-03-12 LAB
25(OH)D3 SERPL-MCNC: 81 NG/ML (ref 30–100)
ALBUMIN SERPL-MCNC: 3.4 G/DL (ref 3.6–5.1)
ALBUMIN/GLOB SERPL: 1.2 (CALC) (ref 1–2.5)
ALP SERPL-CCNC: 83 U/L (ref 37–153)
ALT SERPL-CCNC: 21 U/L (ref 6–29)
AST SERPL-CCNC: 40 U/L (ref 10–35)
BASOPHILS # BLD AUTO: 22 CELLS/UL (ref 0–200)
BASOPHILS NFR BLD AUTO: 0.5 %
BILIRUB SERPL-MCNC: 2 MG/DL (ref 0.2–1.2)
BUN SERPL-MCNC: 14 MG/DL (ref 7–25)
BUN/CREAT SERPL: ABNORMAL (CALC) (ref 6–22)
CALCIUM SERPL-MCNC: 10.6 MG/DL (ref 8.6–10.4)
CHLORIDE SERPL-SCNC: 103 MMOL/L (ref 98–110)
CHOLEST SERPL-MCNC: 176 MG/DL
CHOLEST/HDLC SERPL: 2.4 (CALC)
CO2 SERPL-SCNC: 30 MMOL/L (ref 20–32)
CREAT SERPL-MCNC: 0.76 MG/DL (ref 0.6–1)
EOSINOPHIL # BLD AUTO: 120 CELLS/UL (ref 15–500)
EOSINOPHIL NFR BLD AUTO: 2.8 %
ERYTHROCYTE [DISTWIDTH] IN BLOOD BY AUTOMATED COUNT: 12.9 % (ref 11–15)
GFR/BSA.PRED SERPLBLD CYS-BASED-ARV: 80 ML/MIN/1.73M2
GLOBULIN SER CALC-MCNC: 2.8 G/DL (CALC) (ref 1.9–3.7)
GLUCOSE SERPL-MCNC: 110 MG/DL (ref 65–99)
HBA1C MFR BLD: 7.2 % OF TOTAL HGB
HCT VFR BLD AUTO: 37.7 % (ref 35–45)
HDLC SERPL-MCNC: 74 MG/DL
HGB BLD-MCNC: 13.2 G/DL (ref 11.7–15.5)
LDLC SERPL CALC-MCNC: 88 MG/DL (CALC)
LYMPHOCYTES # BLD AUTO: 1505 CELLS/UL (ref 850–3900)
LYMPHOCYTES NFR BLD AUTO: 35 %
MCH RBC QN AUTO: 35.8 PG (ref 27–33)
MCHC RBC AUTO-ENTMCNC: 35 G/DL (ref 32–36)
MCV RBC AUTO: 102.2 FL (ref 80–100)
MONOCYTES # BLD AUTO: 340 CELLS/UL (ref 200–950)
MONOCYTES NFR BLD AUTO: 7.9 %
NEUTROPHILS # BLD AUTO: 2313 CELLS/UL (ref 1500–7800)
NEUTROPHILS NFR BLD AUTO: 53.8 %
NONHDLC SERPL-MCNC: 102 MG/DL (CALC)
PLATELET # BLD AUTO: 119 THOUSAND/UL (ref 140–400)
PMV BLD REES-ECKER: 10.4 FL (ref 7.5–12.5)
POTASSIUM SERPL-SCNC: 3.5 MMOL/L (ref 3.5–5.3)
PROT SERPL-MCNC: 6.2 G/DL (ref 6.1–8.1)
RBC # BLD AUTO: 3.69 MILLION/UL (ref 3.8–5.1)
SODIUM SERPL-SCNC: 140 MMOL/L (ref 135–146)
TRIGL SERPL-MCNC: 58 MG/DL
TSH SERPL-ACNC: 1.36 MIU/L (ref 0.4–4.5)
WBC # BLD AUTO: 4.3 THOUSAND/UL (ref 3.8–10.8)

## 2025-03-13 ENCOUNTER — RESULTS FOLLOW-UP (OUTPATIENT)
Dept: FAMILY MEDICINE CLINIC | Facility: CLINIC | Age: 80
End: 2025-03-13

## 2025-03-21 ENCOUNTER — OFFICE VISIT (OUTPATIENT)
Dept: FAMILY MEDICINE CLINIC | Facility: CLINIC | Age: 80
End: 2025-03-21
Payer: MEDICARE

## 2025-03-21 VITALS
OXYGEN SATURATION: 98 % | SYSTOLIC BLOOD PRESSURE: 130 MMHG | TEMPERATURE: 97.8 F | BODY MASS INDEX: 29.45 KG/M2 | HEIGHT: 60 IN | HEART RATE: 82 BPM | DIASTOLIC BLOOD PRESSURE: 72 MMHG | WEIGHT: 150 LBS

## 2025-03-21 DIAGNOSIS — E87.6 HYPOKALEMIA: ICD-10-CM

## 2025-03-21 DIAGNOSIS — E78.2 MIXED HYPERLIPIDEMIA: ICD-10-CM

## 2025-03-21 DIAGNOSIS — E66.3 OVERWEIGHT (BMI 25.0-29.9): ICD-10-CM

## 2025-03-21 DIAGNOSIS — E11.9 TYPE 2 DIABETES MELLITUS WITHOUT COMPLICATION, WITHOUT LONG-TERM CURRENT USE OF INSULIN (HCC): Primary | ICD-10-CM

## 2025-03-21 DIAGNOSIS — E83.52 HYPERCALCEMIA: ICD-10-CM

## 2025-03-21 DIAGNOSIS — I10 ESSENTIAL HYPERTENSION: ICD-10-CM

## 2025-03-21 DIAGNOSIS — M81.0 AGE-RELATED OSTEOPOROSIS WITHOUT CURRENT PATHOLOGICAL FRACTURE: ICD-10-CM

## 2025-03-21 LAB
CREAT UR-MCNC: 73.2 MG/DL
MICROALBUMIN UR-MCNC: 14.9 MG/L
MICROALBUMIN/CREAT 24H UR: 20 MG/G CREATININE (ref 0–30)

## 2025-03-21 PROCEDURE — 82570 ASSAY OF URINE CREATININE: CPT | Performed by: PHYSICIAN ASSISTANT

## 2025-03-21 PROCEDURE — G2211 COMPLEX E/M VISIT ADD ON: HCPCS | Performed by: PHYSICIAN ASSISTANT

## 2025-03-21 PROCEDURE — 99214 OFFICE O/P EST MOD 30 MIN: CPT | Performed by: PHYSICIAN ASSISTANT

## 2025-03-21 PROCEDURE — 82043 UR ALBUMIN QUANTITATIVE: CPT | Performed by: PHYSICIAN ASSISTANT

## 2025-03-21 NOTE — ASSESSMENT & PLAN NOTE
She is up to date with her DEXA scan as of November 2024. She will continue her Prolia injections through rheumatology and her calcium and vitamin D supplements.

## 2025-03-21 NOTE — ASSESSMENT & PLAN NOTE
Her blood pressure readings at home are consistent with today's reading of 130/72 mmHg, with occasional systolic readings as high as 135 mmHg. She will continue her current blood pressure medication regimen of losartan 50 mg daily and hydrochlorothiazide 25 mg daily.

## 2025-03-21 NOTE — ASSESSMENT & PLAN NOTE
BMI Counseling: Body mass index is 29.29 kg/m². The BMI is above normal. Nutrition recommendations include moderation in carbohydrate intake. Exercise recommendations include exercising 3-5 times per week.

## 2025-03-21 NOTE — ASSESSMENT & PLAN NOTE
Her A1c has significantly improved to 7.2, indicating effective management of her diabetes through dietary changes. She will continue her current diet, which excludes candy, cake, and ice cream, and includes a muffin in the morning. A recheck of her A1c will be conducted during her wellness visit in July 2025.  Lab Results   Component Value Date    HGBA1C 7.2 (H) 03/11/2025       Orders:    Albumin / creatinine urine ratio

## 2025-03-21 NOTE — PROGRESS NOTES
Diabetic Foot Exam    Patient's shoes and socks removed.    Right Foot/Ankle   Right Foot Inspection  Skin Exam: skin normal and skin intact. No dry skin, no warmth, no callus, no erythema, no maceration, no abnormal color, no pre-ulcer, no ulcer and no callus.     Toe Exam: ROM and strength within normal limits.     Sensory   Monofilament testing: intact    Vascular  Capillary refills: < 3 seconds  The right DP pulse is 2+. The right PT pulse is 2+.     Left Foot/Ankle  Left Foot Inspection  Skin Exam: skin normal and skin intact. No dry skin, no warmth, no erythema, no maceration, normal color, no pre-ulcer, no ulcer and no callus.     Toe Exam: ROM and strength within normal limits.     Sensory   Monofilament testing: intact    Vascular  Capillary refills: < 3 seconds  The left DP pulse is 2+. The left PT pulse is 2+.     Assign Risk Category  No deformity present  No loss of protective sensation  No weak pulses  Risk: 0    Name: Violeta Hollis      : 1945      MRN: [id number]  Encounter Provider: Venessa Kay PA-C  Encounter Date: 3/21/2025   Encounter department: [facility]  :  Assessment & Plan  Type 2 diabetes mellitus without complication, without long-term current use of insulin (HCC)  Her A1c has significantly improved to 7.2, indicating effective management of her diabetes through dietary changes. She will continue her current diet, which excludes candy, cake, and ice cream, and includes a muffin in the morning. A recheck of her A1c will be conducted during her wellness visit in 2025.  Lab Results   Component Value Date    HGBA1C 7.2 (H) 2025       Orders:    Albumin / creatinine urine ratio    Essential hypertension  Her blood pressure readings at home are consistent with today's reading of 130/72 mmHg, with occasional systolic readings as high as 135 mmHg. She will continue her current blood pressure medication regimen of losartan 50 mg daily and  hydrochlorothiazide 25 mg daily.       Age-related osteoporosis without current pathological fracture  She is up to date with her DEXA scan as of November 2024. She will continue her Prolia injections through rheumatology and her calcium and vitamin D supplements.       Hypokalemia  Her recent blood work showed a potassium level of 3.5, which is within normal limits. She will continue taking potassium 10 mEq once a day.       Overweight (BMI 25.0-29.9)  BMI Counseling: Body mass index is 29.29 kg/m². The BMI is above normal. Nutrition recommendations include moderation in carbohydrate intake. Exercise recommendations include exercising 3-5 times per week.           Hypercalcemia  Her calcium level is slightly elevated. She will reduce her calcium supplement intake to once daily, ensuring it is taken at a time when she is not consuming calcium-rich foods.         Mixed hyperlipidemia  Her cholesterol levels are slightly elevated for a diabetic patient.  Patient is hesitant to take any additional prescription medication.  She is advised to limit her intake of red meat to no more than twice a week and avoid fried foods. She will also monitor her consumption of oils and butter.                History of Present Illness   The patient is a 80-year-old female who presents for follow-up on her chronic medical problems.    She has been adhering to a diet devoid of sugar, with the exception of a morning muffin. Her diet includes cheese with cereal in the morning, but she does not consume milk. She also incorporates yogurt and cheese into her daily meals. She maintains adequate hydration throughout the day. She has abstained from candy, cake, and ice cream. She expresses satisfaction with her current dietary regimen and plans to continue it.    She engages in daily leg exercises, although inconsistently, due to knee injections. She typically achieves between 6000 to 7000 steps per day. She avoids outdoor activities due to a  fear of falling. She reports swelling in her ankle, which she attributes to her knee condition, and manages with compression socks.    She monitors her blood pressure at home every morning, noting that the systolic reading occasionally elevates but generally remains around 121. The diastolic reading consistently stays in the 70s. The highest systolic reading she has recorded at home is 135. She recently refilled her blood pressure medication.    She continues to receive Prolia injections for osteoporosis under the care of Rheumatology. She supplements her diet with calcium and vitamin D.    She is on a daily regimen of potassium 10 mEq. Her recent blood work showed a potassium level of 3.5.        Review of Systems   Respiratory:  Negative for shortness of breath.    Cardiovascular:  Positive for leg swelling. Negative for chest pain and palpitations.   Neurological:  Negative for dizziness and headaches.       Objective   /72   Pulse 82   Temp 97.8 °F (36.6 °C)   Ht 5' (1.524 m)   Wt 68 kg (150 lb)   SpO2 98%   BMI 29.29 kg/m²      Physical Exam  Vitals reviewed.   Constitutional:       General: She is not in acute distress.     Appearance: Normal appearance. She is well-developed. She is not ill-appearing.   HENT:      Head: Normocephalic and atraumatic.   Neck:      Thyroid: No thyromegaly.      Vascular: No carotid bruit.   Cardiovascular:      Rate and Rhythm: Normal rate and regular rhythm.      Pulses: Normal pulses. no weak pulses.           Dorsalis pedis pulses are 2+ on the right side and 2+ on the left side.        Posterior tibial pulses are 2+ on the right side and 2+ on the left side.      Heart sounds: Normal heart sounds. No murmur heard.  Pulmonary:      Effort: Pulmonary effort is normal.      Breath sounds: Normal breath sounds. No wheezing, rhonchi or rales.   Musculoskeletal:      Cervical back: Neck supple.      Right lower leg: Edema (mild non-pitting edema) present.      Left  lower leg: Edema (mild non-pitting edema) present.   Feet:      Right foot:      Skin integrity: No ulcer, skin breakdown, erythema, warmth, callus or dry skin.      Left foot:      Skin integrity: No ulcer, skin breakdown, erythema, warmth, callus or dry skin.   Lymphadenopathy:      Cervical: No cervical adenopathy.   Skin:     General: Skin is warm and dry.   Neurological:      Mental Status: She is alert.   Psychiatric:         Mood and Affect: Mood normal.         Behavior: Behavior normal.         Thought Content: Thought content normal.         Judgment: Judgment normal.             Assessment & Plan  1. Diabetes Mellitus.  Her A1c has significantly improved to 7.2, indicating effective management of her diabetes through dietary changes. She will continue her current diet, which excludes candy, cake, and ice cream, and includes a muffin in the morning. A recheck of her A1c will be conducted during her wellness visit in July 2025.    2. Hypertension.  Her blood pressure readings at home are consistent with today's reading of 130/72 mmHg, with occasional systolic readings as high as 135 mmHg. She will continue her current blood pressure medication regimen.    3. Osteoporosis.  She is up to date with her DEXA scan as of November 2024. She will continue her Prolia injections through rheumatology and her calcium and vitamin D supplements.    4. Hypercalcemia.  Her calcium level is slightly elevated. She will reduce her calcium supplement intake to once daily, ensuring it is taken at a time when she is not consuming calcium-rich foods.    5. Hyperlipidemia.  Her cholesterol levels are slightly elevated for a diabetic patient.  Patient is hesitant to take any additional prescription medication.  She is advised to limit her intake of red meat to no more than twice a week and avoid fried foods. She will also monitor her consumption of oils and butter.    6. Hypokalemia.  Her recent blood work showed a potassium level  of 3.5, which is within normal limits. She will continue taking potassium 10 mEq once a day.    Follow-up  The patient is scheduled for a wellness visit in July 2025.    History of Present Illness  The patient is a 80-year-old female who presents for follow-up on her chronic medical problems.    She has been adhering to a diet devoid of sugar, with the exception of a morning muffin. Her diet includes cheese with cereal in the morning, but she does not consume milk. She also incorporates yogurt and cheese into her daily meals. She maintains adequate hydration throughout the day. She has abstained from candy, cake, and ice cream. She expresses satisfaction with her current dietary regimen and plans to continue it.    She engages in daily leg exercises, although inconsistently, due to knee injections. She typically achieves between 6000 to 7000 steps per day. She avoids outdoor activities due to a fear of falling. She reports swelling in her ankle, which she attributes to her knee condition, and manages with compression socks.    She monitors her blood pressure at home every morning, noting that the systolic reading occasionally elevates but generally remains around 121. The diastolic reading consistently stays in the 70s. The highest systolic reading she has recorded at home is 135. She recently refilled her blood pressure medication.    She continues to receive Prolia injections for osteoporosis under the care of Rheumatology. She supplements her diet with calcium and vitamin D.    She is on a daily regimen of potassium 10 mEq. Her recent blood work showed a potassium level of 3.5.    MEDICATIONS  Prolia, calcium, vitamin D, potassium    Physical Exam  Lungs were auscultated.  Pulses were checked.  Ankles were examined.    Vital Signs  Blood pressure is 130/72.    Results  Laboratory Studies  A1c is 7.2. Potassium level is 3.5. Vitamin D level is 81. Thyroid function is normal. Blood cell counts are slightly low.  Calcium level is slightly high.

## 2025-03-21 NOTE — ASSESSMENT & PLAN NOTE
Her recent blood work showed a potassium level of 3.5, which is within normal limits. She will continue taking potassium 10 mEq once a day.

## 2025-03-22 ENCOUNTER — RESULTS FOLLOW-UP (OUTPATIENT)
Dept: FAMILY MEDICINE CLINIC | Facility: CLINIC | Age: 80
End: 2025-03-22

## 2025-03-23 PROBLEM — E83.52 HYPERCALCEMIA: Status: ACTIVE | Noted: 2025-03-23

## 2025-03-23 PROBLEM — E78.2 MIXED HYPERLIPIDEMIA: Status: ACTIVE | Noted: 2025-03-23

## 2025-03-23 NOTE — ASSESSMENT & PLAN NOTE
Her cholesterol levels are slightly elevated for a diabetic patient.  Patient is hesitant to take any additional prescription medication.  She is advised to limit her intake of red meat to no more than twice a week and avoid fried foods. She will also monitor her consumption of oils and butter.

## 2025-03-23 NOTE — ASSESSMENT & PLAN NOTE
Her calcium level is slightly elevated. She will reduce her calcium supplement intake to once daily, ensuring it is taken at a time when she is not consuming calcium-rich foods.

## 2025-05-20 ENCOUNTER — CLINICAL SUPPORT (OUTPATIENT)
Dept: RHEUMATOLOGY | Facility: CLINIC | Age: 80
End: 2025-05-20

## 2025-05-20 DIAGNOSIS — M81.0 AGE-RELATED OSTEOPOROSIS WITHOUT CURRENT PATHOLOGICAL FRACTURE: Primary | ICD-10-CM

## 2025-05-20 NOTE — PROGRESS NOTES
Assessment/Plan:    Violeta Hollis came into the [facility] today 05/20/25 to receive prolia injection.      Verbal consent obtained.  Consent given by: patient    patient states patient has been medically healthy with no underlining concerns/complications.      Violeta Hollis presents with no symptoms today.       All insturctions were reviewed with the patient.    If the patient should have any questions/concerns, advised patient to contacted [facility].       Subjective:     History provided by: patient    Patient ID: Violeta Hollis is a 80 y.o. female      Objective:    There were no vitals filed for this visit.    Patient tolerated the injection well without any complications.  Injection site/s left.  Medication was provided by office.          Patient signed consent form yes   Patient signed ABN form yes (If no patient is not a medicare patient).   Patient waited 15 minutes after injection no (This only applies to patient's receiving first time injection).       Last Visit: Visit date not found  Next visit:Visit date not found

## 2025-06-11 DIAGNOSIS — I10 ESSENTIAL HYPERTENSION: ICD-10-CM

## 2025-06-11 RX ORDER — HYDROCHLOROTHIAZIDE 25 MG/1
25 TABLET ORAL DAILY
Qty: 90 TABLET | Refills: 1 | Status: SHIPPED | OUTPATIENT
Start: 2025-06-11

## 2025-06-11 RX ORDER — POTASSIUM CHLORIDE 750 MG/1
10 TABLET, EXTENDED RELEASE ORAL DAILY
Qty: 90 TABLET | Refills: 1 | Status: SHIPPED | OUTPATIENT
Start: 2025-06-11

## 2025-06-11 RX ORDER — LOSARTAN POTASSIUM 50 MG/1
50 TABLET ORAL DAILY
Qty: 90 TABLET | Refills: 1 | Status: SHIPPED | OUTPATIENT
Start: 2025-06-11

## 2025-06-12 ENCOUNTER — OFFICE VISIT (OUTPATIENT)
Dept: OBGYN CLINIC | Facility: MEDICAL CENTER | Age: 80
End: 2025-06-12
Payer: MEDICARE

## 2025-06-12 VITALS — BODY MASS INDEX: 29.45 KG/M2 | WEIGHT: 150 LBS | HEIGHT: 60 IN

## 2025-06-12 DIAGNOSIS — M25.561 CHRONIC PAIN OF RIGHT KNEE: Primary | ICD-10-CM

## 2025-06-12 DIAGNOSIS — G89.29 CHRONIC PAIN OF RIGHT KNEE: Primary | ICD-10-CM

## 2025-06-12 DIAGNOSIS — M17.11 PRIMARY OSTEOARTHRITIS OF RIGHT KNEE: ICD-10-CM

## 2025-06-12 PROCEDURE — 99213 OFFICE O/P EST LOW 20 MIN: CPT | Performed by: EMERGENCY MEDICINE

## 2025-06-12 PROCEDURE — 20610 DRAIN/INJ JOINT/BURSA W/O US: CPT | Performed by: EMERGENCY MEDICINE

## 2025-06-12 RX ORDER — ROPIVACAINE HYDROCHLORIDE 2 MG/ML
4 INJECTION, SOLUTION EPIDURAL; INFILTRATION; PERINEURAL
Status: COMPLETED | OUTPATIENT
Start: 2025-06-12 | End: 2025-06-12

## 2025-06-12 RX ORDER — METHYLPREDNISOLONE ACETATE 40 MG/ML
4 INJECTION, SUSPENSION INTRA-ARTICULAR; INTRALESIONAL; INTRAMUSCULAR; SOFT TISSUE
Status: COMPLETED | OUTPATIENT
Start: 2025-06-12 | End: 2025-06-12

## 2025-06-12 RX ADMIN — METHYLPREDNISOLONE ACETATE 4 ML: 40 INJECTION, SUSPENSION INTRA-ARTICULAR; INTRALESIONAL; INTRAMUSCULAR; SOFT TISSUE at 11:00

## 2025-06-12 RX ADMIN — ROPIVACAINE HYDROCHLORIDE 4 ML: 2 INJECTION, SOLUTION EPIDURAL; INFILTRATION; PERINEURAL at 11:00

## 2025-06-12 NOTE — PATIENT INSTRUCTIONS
In order to minimize further degenerative changes and pain from arthritis we recommend maintaining an active lifestyle and continually trying to maintain a healthy weight / BMI (Body Mass Index).  If you are interested we can refer you to Weight Management to help with weight loss.  I recommended avoiding any tobacco use.  On rainy days and cold days you may have worsening pain than baseline.    You may use Advil (ibuprofen) 400-600mg every 6 hours or at least twice per day (OR Aleve (naproxen) 250-500mg every 12 hours as needed for pain and inflammation).  However do not mix or take other NSAIDs together such as Advil, Motrin, ibuprofen, Celebrex, naproxen, diclofenac or Aleve.      You may also take Tylenol (acetaminophen) together with Advil (ibuprofen) or Aleve (naproxen) as this is safe and can help decrease your pain levels.  The dosing for Tylenol is 500mg every 4-6 hours as needed OR max 1,000mg per dose up to 3 times per day for a total of 3,000mg per day  *Check with your primary care physician to see if these medications are safe to take and to make sure they do not interfere with your other medications and medical issues.            [email or web address] for more information     Steroid Joint Injection   AMBULATORY CARE:   What you need to know about steroid joint injection:  A steroid joint injection is a procedure to inject steroid medicine into a joint. Steroid medicine decreases pain and inflammation. The injection may also contain an anesthetic (numbing medicine) to decrease pain. It may be done to treat conditions such as arthritis, gout, or carpal tunnel syndrome. The injections may be given in your knee, ankle, shoulder, elbow, wrist, or ankle.   How to prepare for steroid joint injection:  Your healthcare provider will talk to you about how to prepare for this procedure. He will tell you what medicines to take or not take on the day of your procedure. You may need to stop taking blood thinners  several days before your procedure.   What will happen during steroid joint injection:  You may be given local anesthesia to numb the area where the injection will be given. With local anesthesia, you may still feel pressure during the procedure, but you should not feel any pain. Your healthcare provider may use ultrasound or fluoroscopy (a type of x-ray) to guide the needle to the right area. He will then inject the steroid into your joint. A bandage will be placed on the injection site.   What will happen after steroid joint injection:  You may have redness, warmth, or sweating in your face and chest right after the steroid injection. Steroids can affect blood sugar levels. If you have diabetes, you should check your blood sugars closely in the first 24 hours after your procedure.   Risks of steroid joint injection:  You may get an infection in your joint. The injection may also cause more pain during the first 24 to 36 hours. You may need more than one injection to feel pain relief. The skin near the injection site may be damaged and become discolored or indented. This can happen if the steroid is placed too close to your skin. A tendon near the injection site may rupture or a nerve can be damaged.  Contact your healthcare provider if:   You have fever or chills.     You have redness or swelling in the injection site.     You have more pain than usual in your joint for more than 72 hours.     You have questions or concerns about your condition or care.  Medicines:   Pain medicine  may be given. Ask how to take this medicine safely.     Take your medicine as directed.  Contact your healthcare provider if you think your medicine is not helping or if you have side effects. Tell him or her if you are allergic to any medicine. Keep a list of the medicines, vitamins, and herbs you take. Include the amounts, and when and why you take them. Bring the list or the pill bottles to follow-up visits. Carry your medicine list  with you in case of an emergency.  Self-care:   Leave the bandage on for 8 to 12 hours.  Care for your wound as directed.    Rest the area  as directed. You may need to decrease weight on certain joints, such as the knee, for a period of time. Ask when you can return to your daily activities.     Elevate  your limb where the steroid injection was given. Elevate the limb above the level of your heart as often as you can. This will help decrease swelling and pain. Prop your limb on pillows or blankets to keep it elevated comfortably.     Apply ice  on your joint for 15 to 20 minutes every hour or as directed. Use an ice pack, or put crushed ice in a plastic bag. Cover it with a towel. Ice helps prevent tissue damage and decreases swelling and pain.  Follow up with your healthcare provider as directed:  Write down your questions so you remember to ask them during your visits.   © 2017 [employer] Information is for End User's use only and may not be sold, redistributed or otherwise used for commercial purposes. All illustrations and images included in CareNotes® are the copyrighted property of [employer], [employer] or [employer].  The above information is an  only. It is not intended as medical advice for individual conditions or treatments. Talk to your doctor, nurse or pharmacist before following any medical regimen to see if it is safe and effective for you.      Arthritis   AMBULATORY CARE:   Arthritis  is a disease that causes inflammation in one or more joints. There are many types of arthritis, such as osteoarthritis, rheumatoid arthritis, and septic arthritis. Some types cause inflammation in the joints. Other types wear away the cartilage between joints. This makes the bones of the joint rub together when you move the joint. Your symptoms may be constant, or symptoms may come and go. Arthritis often gets worse over time and can cause permanent joint damage.  Common signs  and symptoms of arthritis:   Pain, swelling, or stiffness in the joint    Limited range of motion in the joint    Warmth or redness over the joint    Tenderness when you touch the joint    Stiff joints in the morning that loosen with movement    A creaking or grinding sound when you move the joint    Fever  Seek care immediately if:   You have a fever and severe joint pain or swelling.     You cannot move the affected joint.     You have severe joint pain you cannot tolerate.  Contact your healthcare provider if:   Your pain or swelling does not get better with treatment.    You have questions or concerns about your condition or care.  Treatment  will depend on the type of arthritis you have and if it is severe. You may need any of the following:  Acetaminophen  decreases pain and fever. It is available without a doctor's order. Ask how much to take and how often to take it. Follow directions. Acetaminophen can cause liver damage if not taken correctly.    NSAIDs , such as ibuprofen, help decrease swelling, pain, and fever. This medicine is available with or without a doctor's order. NSAIDs can cause stomach bleeding or kidney problems in certain people. If you take blood thinner medicine, always ask your healthcare provider if NSAIDs are safe for you. Always read the medicine label and follow directions.    Steroid medicine  helps reduce swelling and pain.     Surgery  may be needed to repair or replace a damaged joint.  Manage arthritis:   Rest your painful joint so it can heal.  Your healthcare provider may recommend crutches or a walker if the affected joint is in a leg.     Apply ice or heat to the joint.  Both can help decrease swelling and pain. Ice may also help prevent tissue damage. Use an ice pack, or put crushed ice in a plastic bag. Cover it with a towel and place it on your joint for 15 to 20 minutes every hour or as directed. You can apply heat for 20 minutes every 2 hours. Heat treatment includes hot  packs or heat lamps.    Elevate your joint.  Elevation helps reduce swelling and pain. Raise your joint above the level of your heart as often as you can. Prop your painful joint on pillows to keep it above your heart comfortably.    Go to physical or occupational therapy as directed.  A physical therapist can teach you exercises to improve flexibility and range of motion. You may also be shown non-weight-bearing exercises that are safe for your joints, such as swimming. Exercise can help keep your joints flexible and reduce pain. An occupational therapist can help you learn to do your daily activities when your joints are stiff or sore.    Maintain a healthy weight.  Extra weight puts increased pressure on your joints. Ask your healthcare provider what you should weigh. If you need to lose weight, he can help you create a weight loss program. Weight loss can help reduce pain and increase your ability to do your activities. The amount of exercise you do may vary each day, depending on your symptoms.    Wear flat or low-heeled shoes.  This will help decrease pain and reduce pressure on your ankle, knee, and hip joints.    Use support devices as directed.  You may be given splints to wear on your hands to help your joints rest and to decrease inflammation. While you sleep, use a pillow that is firm enough to support your neck and head.  Other equipment  that may help you move and prevent falls:  Orthotic shoes or insoles  help support your feet when you walk.    Crutches, a cane, or a walker  may help decrease your risk for falling. They also decrease stress on affected joints.     Devices to prevent falls  include raised toilet seats and bathtub bars to help you get up from sitting. Handrails can be placed in areas where you need balance and support.  Follow up with your healthcare provider or rheumatologist as directed:  Write down your questions so you remember to ask them during your visits.  © 2017 [employer] Information is for End User's use only and may not be sold, redistributed or otherwise used for commercial purposes. All illustrations and images included in CareNotes® are the copyrighted property of [employer] or [employer].  The above information is an  only. It is not intended as medical advice for individual conditions or treatments. Talk to your doctor, nurse or pharmacist before following any medical regimen to see if it is safe and effective for you.

## 2025-06-12 NOTE — ASSESSMENT & PLAN NOTE
Repeat CSI provided today, previous CSI 2/12/25  Discussed Visco provided pamphlet  Has participated in PT, declines at this time  Currently declines referral to surgeon    Orders:    Large joint arthrocentesis: R knee

## 2025-06-12 NOTE — PROGRESS NOTES
"Name: Violeta Hollis      : 1945      MRN: [id number]  Encounter Provider: Mihai Fleming MD  Encounter Date: 2025   [facility]  :  Assessment & Plan  Chronic pain of right knee  Repeat CSI provided today, previous CSI 25  Discussed Visco provided pamphlet  Has participated in PT, declines at this time  Currently declines referral to surgeon    Orders:    Large joint arthrocentesis: R knee    Primary osteoarthritis of right knee    Orders:    Large joint arthrocentesis: R knee        Return in about 3 months (around 2025).      Subjective:     History of Present Illness   Violeta returns for right knee pain and discomfort previous CSI provided  for which she received significant benefit up until recently          Review of Systems    The following portions of the patient's chart were reviewed and updated as appropriate:   Allergy:  Allergies[1]    Medications:  Current Medications[2]    Problem List[3]    Objective:  Objective   Ht 5' (1.524 m)   Wt 68 kg (150 lb)   BMI 29.29 kg/m²         Right Knee Exam     Other   Erythema: absent            Physical Exam      Neurologic Exam    Large joint arthrocentesis: R knee    Performed by: Mihai Fleming MD  Authorized by: Mihai Fleming MD    Universal Protocol:  Consent: Verbal consent obtained  Risks and benefits: risks, benefits and alternatives were discussed  Consent given by: patient  Time out: Immediately prior to procedure a \"time out\" was called to verify the correct patient, procedure, equipment, support staff and site/side marked as required.  Timeout called at: 2025 11:06 AM.  Patient understanding: patient states understanding of the procedure being performed  Test results: test results available and properly labeled  Site marked: the operative site was marked  Patient identity confirmed: verbally with patient  Supporting Documentation  Indications: pain     Is this a " Visco injection? NoProcedure Details  Location: knee - R knee  Preparation: Patient was prepped and draped in the usual sterile fashion  Needle size: 22 G  Ultrasound guidance: no  Approach: anterolateral  Medications administered: 4 mL ropivacaine 0.2 %; 4 mL methylPREDNISolone acetate 40 mg/mL    Patient tolerance: patient tolerated the procedure well with no immediate complications  Dressing:  Sterile dressing applied    No erythema of knee(s)          I have personally reviewed the written report of the pertinent studies.     Xrays Right Knee  IMPRESSION:  Mild lateral compartment degenerative changes as above.  Minimal valgus angulation secondarily.  Small effusion.  Minimal patellar enthesopathy.  No fracture          Past Medical History[4]    Past Surgical History[5]    Social History     Socioeconomic History    Marital status: Single     Spouse name: Not on file    Number of children: Not on file    Years of education: Not on file    Highest education level: Not on file   Occupational History    Occupation: [employer]   Tobacco Use    Smoking status: Former     Current packs/day: 0.00     Average packs/day: 0.5 packs/day for 5.0 years (2.5 ttl pk-yrs)     Types: Cigarettes     Start date: 1965     Quit date: 1970     Years since quittin.4     Passive exposure: Never    Smokeless tobacco: Never    Tobacco comments:     STARTED AT AGE 18. STOPPED AT AGE 23.    Vaping Use    Vaping status: Never Used   Substance and Sexual Activity    Alcohol use: Not Currently     Comment: Wine for Special Occassion    Drug use: Never    Sexual activity: Not on file   Other Topics Concern    Not on file   Social History Narrative    EXERCISES MODERATELY LESS THAN 3 TIMES WEEK     Social Drivers of Health     Financial Resource Strain: Low Risk  (5/15/2023)    Overall Financial Resource Strain (CARDIA)     Difficulty of Paying Living Expenses: Not hard at all   Food Insecurity: No Food Insecurity (2024)     Nursing - Inadequate Food Risk Classification     Worried About Running Out of Food in the Last Year: Never true     Ran Out of Food in the Last Year: Never true     Ran Out of Food in the Last Year: Not on file   Transportation Needs: No Transportation Needs (6/19/2024)    PRAPARE - Transportation     Lack of Transportation (Medical): No     Lack of Transportation (Non-Medical): No   Physical Activity: Not on file   Stress: Not on file   Social Connections: Unknown (6/18/2024)    Received from [employer]    Social [employer]     How often do you feel lonely or isolated from those around you? (Adult - for ages 18 years and over): Not on file   Intimate Partner Violence: Not on file   Housing Stability: Low Risk  (6/19/2024)    Housing Stability Vital Sign     Unable to Pay for Housing in the Last Year: No     Number of Times Moved in the Last Year: 0     Homeless in the Last Year: No       Family History[6]           [1] No Known Allergies  [2]   Current Outpatient Medications:     Calcium Carbonate-Vitamin D 600-200 MG-UNIT TABS, Take 1 tablet by mouth in the morning and 1 tablet in the evening., Disp: , Rfl:     Cholecalciferol (VITAMIN D3) 1000 units CAPS, Take by mouth in the morning., Disp: , Rfl:     denosumab (PROLIA) 60 mg/mL, Inject under the skin every 6 (six) months, Disp: , Rfl:     hydroCHLOROthiazide 25 mg tablet, take 1 tablet by mouth once daily, Disp: 90 tablet, Rfl: 1    losartan (COZAAR) 50 mg tablet, take 1 tablet by mouth once daily, Disp: 90 tablet, Rfl: 1    Multiple Vitamins-Minerals (DAILY MULTI PO), Take by mouth in the morning., Disp: , Rfl:     potassium chloride (Klor-Con M10) 10 mEq tablet, take 1 tablet by mouth once daily, Disp: 90 tablet, Rfl: 1    Current Facility-Administered Medications:     denosumab (PROLIA) subcutaneous injection 60 mg, 60 mg, Subcutaneous, Q6 Months, , 60 mg at 05/20/25 1130    ropivacaine (NAROPIN) injection 4 mL, 4 mL, Intra-articular, Titrated, , 4 mL at  10/09/24 1224  [3]   Patient Active Problem List  Diagnosis    Dietary calcium deficiency    Overweight (BMI 25.0-29.9)    Osteoporosis    Hx of colonic polyps    Heart murmur    Type 2 diabetes mellitus without complication, without long-term current use of insulin (HCC)    Thrombocytopenia (HCC)    Essential hypertension    Hypokalemia    Low hematocrit    Leg swelling    Primary generalized (osteo)arthritis    Lumbar spondylosis    Acute left-sided low back pain without sciatica    Acute pain of right knee    Chronic pain of right knee    Hypercalcemia    Mixed hyperlipidemia    Primary osteoarthritis of right knee   [4]   Past Medical History:  Diagnosis Date    Chicken pox     Diabetes mellitus (HCC)     Fall 05/01/2019    Hypertension     Kidney stones     Malignant neoplasm of skin     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    Osteoarthritis     Osteoporosis    [5]   Past Surgical History:  Procedure Laterality Date    CATARACT EXTRACTION  07/2024    Pt reported    COLONOSCOPY  2018    COLONOSCOPY  2015    COMPLETE; DUE 3 YRS    COLONOSCOPY  06/20/2018    polyp x1    DILATION AND CURETTAGE OF UTERUS  08/2013    THICKENED ENDO ON USG, CVX STENOSIS, PATH: BENIGN POLYP FRAGMENTS AND INACTIVE ENDOMETRIUM. NO NEOPLASIA ; IMPRESSION: 10/27/14; RISA MORRIS    HYSTEROSCOPY      MOHS SURGERY  02/19/2020    BCC on face    TONSILLECTOMY     [6]   Family History  Problem Relation Name Age of Onset    Kidney cancer Mother  70        RENAL CANCER    Hypertension Mother      Skin cancer Mother      Osteoporosis Mother      Osteoporotic fracture Mother          hip - age 92     Osteoporotic fracture Paternal Grandmother

## 2025-07-11 ENCOUNTER — OFFICE VISIT (OUTPATIENT)
Dept: FAMILY MEDICINE CLINIC | Facility: CLINIC | Age: 80
End: 2025-07-11
Payer: MEDICARE

## 2025-07-11 VITALS
BODY MASS INDEX: 30.63 KG/M2 | HEIGHT: 60 IN | DIASTOLIC BLOOD PRESSURE: 84 MMHG | TEMPERATURE: 97.8 F | WEIGHT: 156 LBS | SYSTOLIC BLOOD PRESSURE: 120 MMHG

## 2025-07-11 DIAGNOSIS — E11.9 TYPE 2 DIABETES MELLITUS WITHOUT COMPLICATION, WITHOUT LONG-TERM CURRENT USE OF INSULIN (HCC): ICD-10-CM

## 2025-07-11 DIAGNOSIS — E78.2 MIXED HYPERLIPIDEMIA: ICD-10-CM

## 2025-07-11 DIAGNOSIS — E66.3 OVERWEIGHT (BMI 25.0-29.9): ICD-10-CM

## 2025-07-11 DIAGNOSIS — Z00.00 MEDICARE ANNUAL WELLNESS VISIT, SUBSEQUENT: Primary | ICD-10-CM

## 2025-07-11 DIAGNOSIS — E83.52 HYPERCALCEMIA: ICD-10-CM

## 2025-07-11 DIAGNOSIS — I10 ESSENTIAL HYPERTENSION: ICD-10-CM

## 2025-07-11 LAB — SL AMB POCT HEMOGLOBIN AIC: 6.1 (ref ?–6.5)

## 2025-07-11 PROCEDURE — 83036 HEMOGLOBIN GLYCOSYLATED A1C: CPT | Performed by: PHYSICIAN ASSISTANT

## 2025-07-11 PROCEDURE — G0439 PPPS, SUBSEQ VISIT: HCPCS | Performed by: PHYSICIAN ASSISTANT

## 2025-07-11 PROCEDURE — 99214 OFFICE O/P EST MOD 30 MIN: CPT | Performed by: PHYSICIAN ASSISTANT

## 2025-07-11 PROCEDURE — G2211 COMPLEX E/M VISIT ADD ON: HCPCS | Performed by: PHYSICIAN ASSISTANT

## 2025-07-11 NOTE — PATIENT INSTRUCTIONS
Medicare Preventive Visit Patient Instructions  Thank you for completing your Welcome to Medicare Visit or Medicare Annual Wellness Visit today. Your next wellness visit will be due in one year (7/12/2026).  The screening/preventive services that you may require over the next 5-10 years are detailed below. Some tests may not apply to you based off risk factors and/or age. Screening tests ordered at today's visit but not completed yet may show as past due. Also, please note that scanned in results may not display below.  Preventive Screenings:  Service Recommendations Previous Testing/Comments   Colorectal Cancer Screening  * Colonoscopy    * Fecal Occult Blood Test (FOBT)/Fecal Immunochemical Test (FIT)  * Fecal DNA/Cologuard Test  * Flexible Sigmoidoscopy Age: 45-75 years old   Colonoscopy: every 10 years (may be performed more frequently if at higher risk)  OR  FOBT/FIT: every 1 year  OR  Cologuard: every 3 years  OR  Sigmoidoscopy: every 5 years  Screening may be recommended earlier than age 45 if at higher risk for colorectal cancer. Also, an individualized decision between you and your healthcare provider will decide whether screening between the ages of 76-85 would be appropriate. Colonoscopy: 07/20/2021  FOBT/FIT: Not on file  Cologuard: Not on file  Sigmoidoscopy: Not on file          Breast Cancer Screening Age: 40+ years old  Frequency: every 1-2 years  Not required if history of left and right mastectomy Mammogram: 07/17/2024    Screening Current   Cervical Cancer Screening Between the ages of 21-29, pap smear recommended once every 3 years.   Between the ages of 30-65, can perform pap smear with HPV co-testing every 5 years.   Recommendations may differ for women with a history of total hysterectomy, cervical cancer, or abnormal pap smears in past. Pap Smear: Not on file    Screening Not Indicated   Hepatitis C Screening Once for adults born between 1945 and 1965  More frequently in patients at high risk  for Hepatitis C Hep C Antibody: 04/24/2018    Screening Current   Diabetes Screening 1-2 times per year if you're at risk for diabetes or have pre-diabetes Fasting glucose: No results in last 5 years (No results in last 5 years)  A1C: 6.1 (7/11/2025)  Screening Not Indicated  History Diabetes   Cholesterol Screening Once every 5 years if you don't have a lipid disorder. May order more often based on risk factors. Lipid panel: 03/11/2025    Screening Not Indicated  History Lipid Disorder     Other Preventive Screenings Covered by Medicare:  Abdominal Aortic Aneurysm (AAA) Screening: covered once if your at risk. You're considered to be at risk if you have a family history of AAA.  Lung Cancer Screening: covers low dose CT scan once per year if you meet all of the following conditions: (1) Age 55-77; (2) No signs or symptoms of lung cancer; (3) Current smoker or have quit smoking within the last 15 years; (4) You have a tobacco smoking history of at least 20 pack years (packs per day multiplied by number of years you smoked); (5) You get a written order from a healthcare provider.  Glaucoma Screening: covered annually if you're considered high risk: (1) You have diabetes OR (2) Family history of glaucoma OR (3)  aged 50 and older OR (4)  American aged 65 and older  Osteoporosis Screening: covered every 2 years if you meet one of the following conditions: (1) You're estrogen deficient and at risk for osteoporosis based off medical history and other findings; (2) Have a vertebral abnormality; (3) On glucocorticoid therapy for more than 3 months; (4) Have primary hyperparathyroidism; (5) On osteoporosis medications and need to assess response to drug therapy.   Last bone density test (DXA Scan): 11/19/2024.  HIV Screening: covered annually if you're between the age of 15-65. Also covered annually if you are younger than 15 and older than 65 with risk factors for HIV infection. For pregnant  patients, it is covered up to 3 times per pregnancy.    Immunizations:  Immunization Recommendations   Influenza Vaccine Annual influenza vaccination during flu season is recommended for all persons aged >= 6 months who do not have contraindications   Pneumococcal Vaccine   * Pneumococcal conjugate vaccine = PCV13 (Prevnar 13), PCV15 (Vaxneuvance), PCV20 (Prevnar 20)  * Pneumococcal polysaccharide vaccine = PPSV23 (Pneumovax) Adults 19-65 yo with certain risk factors or if 65+ yo  If never received any pneumonia vaccine: recommend Prevnar 20 (PCV20)  Give PCV20 if previously received 1 dose of PCV13 or PPSV23   Hepatitis B Vaccine 3 dose series if at intermediate or high risk (ex: diabetes, end stage renal disease, liver disease)   Respiratory syncytial virus (RSV) Vaccine - COVERED BY MEDICARE PART D  * RSVPreF3 (Arexvy) CDC recommends that adults 60 years of age and older may receive a single dose of RSV vaccine using shared clinical decision-making (SCDM)   Tetanus (Td) Vaccine - COST NOT COVERED BY MEDICARE PART B Following completion of primary series, a booster dose should be given every 10 years to maintain immunity against tetanus. Td may also be given as tetanus wound prophylaxis.   Tdap Vaccine - COST NOT COVERED BY MEDICARE PART B Recommended at least once for all adults. For pregnant patients, recommended with each pregnancy.   Shingles Vaccine (Shingrix) - COST NOT COVERED BY MEDICARE PART B  2 shot series recommended in those 19 years and older who have or will have weakened immune systems or those 50 years and older     Health Maintenance Due:      Topic Date Due   • Colorectal Cancer Screening  07/19/2024   • Breast Cancer Screening: Mammogram  07/17/2025   • Hepatitis C Screening  Completed     Immunizations Due:      Topic Date Due   • Influenza Vaccine (1) 09/01/2025     Advance Directives   What are advance directives?  Advance directives are legal documents that state your wishes and plans for  medical care. These plans are made ahead of time in case you lose your ability to make decisions for yourself. Advance directives can apply to any medical decision, such as the treatments you want, and if you want to donate organs.   What are the types of advance directives?  There are many types of advance directives, and each state has rules about how to use them. You may choose a combination of any of the following:  Living will:  This is a written record of the treatment you want. You can also choose which treatments you do not want, which to limit, and which to stop at a certain time. This includes surgery, medicine, IV fluid, and tube feedings.   Durable power of  for healthcare (DPAHC):  This is a written record that states who you want to make healthcare choices for you when you are unable to make them for yourself. This person, called a proxy, is usually a family member or a friend. You may choose more than 1 proxy.  Do not resuscitate (DNR) order:  A DNR order is used in case your heart stops beating or you stop breathing. It is a request not to have certain forms of treatment, such as CPR. A DNR order may be included in other types of advance directives.  Medical directive:  This covers the care that you want if you are in a coma, near death, or unable to make decisions for yourself. You can list the treatments you want for each condition. Treatment may include pain medicine, surgery, blood transfusions, dialysis, IV or tube feedings, and a ventilator (breathing machine).  Values history:  This document has questions about your views, beliefs, and how you feel and think about life. This information can help others choose the care that you would choose.  Why are advance directives important?  An advance directive helps you control your care. Although spoken wishes may be used, it is better to have your wishes written down. Spoken wishes can be misunderstood, or not followed. Treatments may be given  even if you do not want them. An advance directive may make it easier for your family to make difficult choices about your care.   Weight Management   Why it is important to manage your weight:  Being overweight increases your risk of health conditions such as heart disease, high blood pressure, type 2 diabetes, and certain types of cancer. It can also increase your risk for osteoarthritis, sleep apnea, and other respiratory problems. Aim for a slow, steady weight loss. Even a small amount of weight loss can lower your risk of health problems.  How to lose weight safely:  A safe and healthy way to lose weight is to eat fewer calories and get regular exercise. You can lose up about 1 pound a week by decreasing the number of calories you eat by 500 calories each day.   Healthy meal plan for weight management:  A healthy meal plan includes a variety of foods, contains fewer calories, and helps you stay healthy. A healthy meal plan includes the following:  Eat whole-grain foods more often.  A healthy meal plan should contain fiber. Fiber is the part of grains, fruits, and vegetables that is not broken down by your body. Whole-grain foods are healthy and provide extra fiber in your diet. Some examples of whole-grain foods are whole-wheat breads and pastas, oatmeal, brown rice, and bulgur.  Eat a variety of vegetables every day.  Include dark, leafy greens such as spinach, kale, radha greens, and mustard greens. Eat yellow and orange vegetables such as carrots, sweet potatoes, and winter squash.   Eat a variety of fruits every day.  Choose fresh or canned fruit (canned in its own juice or light syrup) instead of juice. Fruit juice has very little or no fiber.  Eat low-fat dairy foods.  Drink fat-free (skim) milk or 1% milk. Eat fat-free yogurt and low-fat cottage cheese. Try low-fat cheeses such as mozzarella and other reduced-fat cheeses.  Choose meat and other protein foods that are low in fat.  Choose beans or other  legumes such as split peas or lentils. Choose fish, skinless poultry (chicken or turkey), or lean cuts of red meat (beef or pork). Before you cook meat or poultry, cut off any visible fat.   Use less fat and oil.  Try baking foods instead of frying them. Add less fat, such as margarine, sour cream, regular salad dressing and mayonnaise to foods. Eat fewer high-fat foods. Some examples of high-fat foods include french fries, doughnuts, ice cream, and cakes.  Eat fewer sweets.  Limit foods and drinks that are high in sugar. This includes candy, cookies, regular soda, and sweetened drinks.  Exercise:  Exercise at least 30 minutes per day on most days of the week. Some examples of exercise include walking, biking, dancing, and swimming. You can also fit in more physical activity by taking the stairs instead of the elevator or parking farther away from stores. Ask your healthcare provider about the best exercise plan for you.    © Copyright [employer] 2018 Information is for End User's use only and may not be sold, redistributed or otherwise used for commercial purposes. All illustrations and images included in CareNotes® are the copyrighted property of [employer] or [employer]      Medicare Preventive Visit Patient Instructions  Thank you for completing your Welcome to Medicare Visit or Medicare Annual Wellness Visit today. Your next wellness visit will be due in one year (7/12/2026).  The screening/preventive services that you may require over the next 5-10 years are detailed below. Some tests may not apply to you based off risk factors and/or age. Screening tests ordered at today's visit but not completed yet may show as past due. Also, please note that scanned in results may not display below.  Preventive Screenings:  Service Recommendations Previous Testing/Comments   Colorectal Cancer Screening  * Colonoscopy    * Fecal Occult Blood Test (FOBT)/Fecal Immunochemical Test (FIT)  * Fecal DNA/Cologuard  Test  * Flexible Sigmoidoscopy Age: 45-75 years old   Colonoscopy: every 10 years (may be performed more frequently if at higher risk)  OR  FOBT/FIT: every 1 year  OR  Cologuard: every 3 years  OR  Sigmoidoscopy: every 5 years  Screening may be recommended earlier than age 45 if at higher risk for colorectal cancer. Also, an individualized decision between you and your healthcare provider will decide whether screening between the ages of 76-85 would be appropriate. Colonoscopy: 07/20/2021  FOBT/FIT: Not on file  Cologuard: Not on file  Sigmoidoscopy: Not on file          Breast Cancer Screening Age: 40+ years old  Frequency: every 1-2 years  Not required if history of left and right mastectomy Mammogram: 07/17/2024    Screening Current   Cervical Cancer Screening Between the ages of 21-29, pap smear recommended once every 3 years.   Between the ages of 30-65, can perform pap smear with HPV co-testing every 5 years.   Recommendations may differ for women with a history of total hysterectomy, cervical cancer, or abnormal pap smears in past. Pap Smear: Not on file    Screening Not Indicated   Hepatitis C Screening Once for adults born between 1945 and 1965  More frequently in patients at high risk for Hepatitis C Hep C Antibody: 04/24/2018    Screening Current   Diabetes Screening 1-2 times per year if you're at risk for diabetes or have pre-diabetes Fasting glucose: No results in last 5 years (No results in last 5 years)  A1C: 6.1 (7/11/2025)  Screening Not Indicated  History Diabetes   Cholesterol Screening Once every 5 years if you don't have a lipid disorder. May order more often based on risk factors. Lipid panel: 03/11/2025    Screening Not Indicated  History Lipid Disorder     Other Preventive Screenings Covered by Medicare:  Abdominal Aortic Aneurysm (AAA) Screening: covered once if your at risk. You're considered to be at risk if you have a family history of AAA.  Lung Cancer Screening: covers low dose CT scan  once per year if you meet all of the following conditions: (1) Age 55-77; (2) No signs or symptoms of lung cancer; (3) Current smoker or have quit smoking within the last 15 years; (4) You have a tobacco smoking history of at least 20 pack years (packs per day multiplied by number of years you smoked); (5) You get a written order from a healthcare provider.  Glaucoma Screening: covered annually if you're considered high risk: (1) You have diabetes OR (2) Family history of glaucoma OR (3)  aged 50 and older OR (4)  American aged 65 and older  Osteoporosis Screening: covered every 2 years if you meet one of the following conditions: (1) You're estrogen deficient and at risk for osteoporosis based off medical history and other findings; (2) Have a vertebral abnormality; (3) On glucocorticoid therapy for more than 3 months; (4) Have primary hyperparathyroidism; (5) On osteoporosis medications and need to assess response to drug therapy.   Last bone density test (DXA Scan): 11/19/2024.  HIV Screening: covered annually if you're between the age of 15-65. Also covered annually if you are younger than 15 and older than 65 with risk factors for HIV infection. For pregnant patients, it is covered up to 3 times per pregnancy.    Immunizations:  Immunization Recommendations   Influenza Vaccine Annual influenza vaccination during flu season is recommended for all persons aged >= 6 months who do not have contraindications   Pneumococcal Vaccine   * Pneumococcal conjugate vaccine = PCV13 (Prevnar 13), PCV15 (Vaxneuvance), PCV20 (Prevnar 20)  * Pneumococcal polysaccharide vaccine = PPSV23 (Pneumovax) Adults 19-65 yo with certain risk factors or if 65+ yo  If never received any pneumonia vaccine: recommend Prevnar 20 (PCV20)  Give PCV20 if previously received 1 dose of PCV13 or PPSV23   Hepatitis B Vaccine 3 dose series if at intermediate or high risk (ex: diabetes, end stage renal disease, liver disease)    Respiratory syncytial virus (RSV) Vaccine - COVERED BY MEDICARE PART D  * RSVPreF3 (Arexvy) CDC recommends that adults 60 years of age and older may receive a single dose of RSV vaccine using shared clinical decision-making (SCDM)   Tetanus (Td) Vaccine - COST NOT COVERED BY MEDICARE PART B Following completion of primary series, a booster dose should be given every 10 years to maintain immunity against tetanus. Td may also be given as tetanus wound prophylaxis.   Tdap Vaccine - COST NOT COVERED BY MEDICARE PART B Recommended at least once for all adults. For pregnant patients, recommended with each pregnancy.   Shingles Vaccine (Shingrix) - COST NOT COVERED BY MEDICARE PART B  2 shot series recommended in those 19 years and older who have or will have weakened immune systems or those 50 years and older     Health Maintenance Due:      Topic Date Due   • Colorectal Cancer Screening  07/19/2024   • Breast Cancer Screening: Mammogram  07/17/2025   • Hepatitis C Screening  Completed     Immunizations Due:      Topic Date Due   • Influenza Vaccine (1) 09/01/2025     Advance Directives   What are advance directives?  Advance directives are legal documents that state your wishes and plans for medical care. These plans are made ahead of time in case you lose your ability to make decisions for yourself. Advance directives can apply to any medical decision, such as the treatments you want, and if you want to donate organs.   What are the types of advance directives?  There are many types of advance directives, and each state has rules about how to use them. You may choose a combination of any of the following:  Living will:  This is a written record of the treatment you want. You can also choose which treatments you do not want, which to limit, and which to stop at a certain time. This includes surgery, medicine, IV fluid, and tube feedings.   Durable power of  for healthcare (DPAHC):  This is a written record that  states who you want to make healthcare choices for you when you are unable to make them for yourself. This person, called a proxy, is usually a family member or a friend. You may choose more than 1 proxy.  Do not resuscitate (DNR) order:  A DNR order is used in case your heart stops beating or you stop breathing. It is a request not to have certain forms of treatment, such as CPR. A DNR order may be included in other types of advance directives.  Medical directive:  This covers the care that you want if you are in a coma, near death, or unable to make decisions for yourself. You can list the treatments you want for each condition. Treatment may include pain medicine, surgery, blood transfusions, dialysis, IV or tube feedings, and a ventilator (breathing machine).  Values history:  This document has questions about your views, beliefs, and how you feel and think about life. This information can help others choose the care that you would choose.  Why are advance directives important?  An advance directive helps you control your care. Although spoken wishes may be used, it is better to have your wishes written down. Spoken wishes can be misunderstood, or not followed. Treatments may be given even if you do not want them. An advance directive may make it easier for your family to make difficult choices about your care.   Weight Management   Why it is important to manage your weight:  Being overweight increases your risk of health conditions such as heart disease, high blood pressure, type 2 diabetes, and certain types of cancer. It can also increase your risk for osteoarthritis, sleep apnea, and other respiratory problems. Aim for a slow, steady weight loss. Even a small amount of weight loss can lower your risk of health problems.  How to lose weight safely:  A safe and healthy way to lose weight is to eat fewer calories and get regular exercise. You can lose up about 1 pound a week by decreasing the number of calories  you eat by 500 calories each day.   Healthy meal plan for weight management:  A healthy meal plan includes a variety of foods, contains fewer calories, and helps you stay healthy. A healthy meal plan includes the following:  Eat whole-grain foods more often.  A healthy meal plan should contain fiber. Fiber is the part of grains, fruits, and vegetables that is not broken down by your body. Whole-grain foods are healthy and provide extra fiber in your diet. Some examples of whole-grain foods are whole-wheat breads and pastas, oatmeal, brown rice, and bulgur.  Eat a variety of vegetables every day.  Include dark, leafy greens such as spinach, kale, radha greens, and mustard greens. Eat yellow and orange vegetables such as carrots, sweet potatoes, and winter squash.   Eat a variety of fruits every day.  Choose fresh or canned fruit (canned in its own juice or light syrup) instead of juice. Fruit juice has very little or no fiber.  Eat low-fat dairy foods.  Drink fat-free (skim) milk or 1% milk. Eat fat-free yogurt and low-fat cottage cheese. Try low-fat cheeses such as mozzarella and other reduced-fat cheeses.  Choose meat and other protein foods that are low in fat.  Choose beans or other legumes such as split peas or lentils. Choose fish, skinless poultry (chicken or turkey), or lean cuts of red meat (beef or pork). Before you cook meat or poultry, cut off any visible fat.   Use less fat and oil.  Try baking foods instead of frying them. Add less fat, such as margarine, sour cream, regular salad dressing and mayonnaise to foods. Eat fewer high-fat foods. Some examples of high-fat foods include french fries, doughnuts, ice cream, and cakes.  Eat fewer sweets.  Limit foods and drinks that are high in sugar. This includes candy, cookies, regular soda, and sweetened drinks.  Exercise:  Exercise at least 30 minutes per day on most days of the week. Some examples of exercise include walking, biking, dancing, and swimming.  You can also fit in more physical activity by taking the stairs instead of the elevator or parking farther away from stores. Ask your healthcare provider about the best exercise plan for you.    © Copyright [employer] 2018 Information is for End User's use only and may not be sold, redistributed or otherwise used for commercial purposes. All illustrations and images included in CareNotes® are the copyrighted property of [employer] or [employer]

## 2025-07-11 NOTE — ASSESSMENT & PLAN NOTE
Controlled. Continue losartan 50 mg daily and HCTZ 25 mg daily.  Orders:    Comprehensive metabolic panel; Future    CBC and differential; Future    TSH, 3rd generation with Free T4 reflex; Future

## 2025-07-11 NOTE — ASSESSMENT & PLAN NOTE
Limit fat intake. Does not want statin. Reviewed recommended LDL goal of <70 with diabetes. Recheck lipid panel prior to next visit.  Orders:    Lipid Panel with Direct LDL reflex; Future

## 2025-07-11 NOTE — ASSESSMENT & PLAN NOTE
Patient cut back on calcium to once daily after last labs. Will recheck with labs prior to next visit.  Orders:    Comprehensive metabolic panel; Future    Vitamin D 25 hydroxy; Future

## 2025-07-11 NOTE — PROGRESS NOTES
Name: Violeta Hollis      : 1945      MRN: [id number]  Encounter Provider: Venessa Kay PA-C  Encounter Date: 2025   Encounter department: [facility]  :  Assessment & Plan  Medicare annual wellness visit, subsequent         Type 2 diabetes mellitus without complication, without long-term current use of insulin (HCC)  Diet-controlled. Continue limiting carb intake. Recheck labs prior to next visit in 6 months.   Lab Results   Component Value Date    HGBA1C 6.1 2025       Orders:    POCT hemoglobin A1c    Comprehensive metabolic panel; Future    CBC and differential; Future    Lipid Panel with Direct LDL reflex; Future    TSH, 3rd generation with Free T4 reflex; Future    Hemoglobin A1C; Future    Albumin / creatinine urine ratio; Future    Essential hypertension  Controlled. Continue losartan 50 mg daily and HCTZ 25 mg daily.  Orders:    Comprehensive metabolic panel; Future    CBC and differential; Future    TSH, 3rd generation with Free T4 reflex; Future    Overweight (BMI 25.0-29.9)  BMI Counseling: Body mass index is 30.47 kg/m². The BMI is above normal. Nutrition recommendations include moderation in carbohydrate intake. Exercise recommendations include exercising 3-5 times per week.          Mixed hyperlipidemia  Limit fat intake. Does not want statin. Reviewed recommended LDL goal of <70 with diabetes. Recheck lipid panel prior to next visit.  Orders:    Lipid Panel with Direct LDL reflex; Future    Hypercalcemia  Patient cut back on calcium to once daily after last labs. Will recheck with labs prior to next visit.  Orders:    Comprehensive metabolic panel; Future    Vitamin D 25 hydroxy; Future       Assessment & Plan  1. Medicare wellness visit.  - Her blood glucose levels have shown significant improvement, decreasing from 7.2 to 6.1.  - Blood pressure is well-regulated at 120/84 mmHg with losartan 50 mg daily and hydrochlorothiazide 25 mg daily.  - She is  receiving Prolia injections biannually for bone density management and is also on calcium and vitamin D supplements. Her cholesterol levels were slightly elevated during the last assessment.  - She underwent a colonoscopy in 2021 and is scheduled for a mammogram in two weeks. She has received her tetanus vaccine in 2019, shingles vaccine, pneumonia vaccine, influenza vaccine in the fall, and is up-to-date with her COVID-19 boosters. She has declined the RSV vaccine. She has a power of  in place and reports no memory concerns. There is no family history of abdominal aortic aneurysm. She has no known drug allergies and underwent cataract surgery in July 2024. She does not require any medication refills at this time.  - She is advised to continue her current regimen of losartan 50 mg daily, hydrochlorothiazide 25 mg daily, Prolia injections every six months, calcium and vitamin D supplements, daily multivitamin, and potassium 10 mEq daily. A re-evaluation of her calcium and potassium levels will be conducted during her next lab visit. Her cholesterol levels will also be reassessed during the next lab visit. She is advised to limit her intake of fatty foods such as red meat, fried food, cheese, butter, and oils. A printed order for the RSV vaccine will be provided for administration at the pharmacy.    Follow-up  A follow-up visit is scheduled for January 2026.      Preventive health issues were discussed with patient, and age appropriate screening tests were ordered as noted in patient's After Visit Summary. Personalized health advice and appropriate referrals for health education or preventive services given if needed, as noted in patient's After Visit Summary.    History of Present Illness     HPI     History of Present Illness  The patient is a 80-year-old female who presents for her Medicare wellness visit today.    She has been adhering to dietary modifications, including reducing sugar intake, which has  resulted in improved blood sugar levels. She is on losartan and hydrochlorothiazide for blood pressure management. She monitors her blood pressure daily at home, which typically reads lower than the current reading of 120/84. She reports no episodes of lightheadedness or dizziness. She is on Prolia injections every 6 months for bone density management and also takes calcium and vitamin D supplements. Due to a previous high calcium level noted in 03/2025, she has reduced her calcium intake from two to one tablet daily. She also takes a daily potassium supplement. Her cholesterol levels were previously above the target range, and she is currently on statin therapy. Her last colonoscopy was in 2021, and she is due for a mammogram in two weeks. She maintains hydration by consuming water with electrolytes throughout the day. She does not consume alcohol and has a power of  in place. She reports no memory issues. She experiences swelling during the summer, which is less pronounced in the morning and increases as the day progresses. She finds compression stockings helpful but avoids wearing them in the summer due to heat discomfort. She does not add extra salt to her diet. She recently refilled her medications at [facility] and does not require any refills at this time.    PAST SURGICAL HISTORY:  Cataract surgery in 07/2024.    SOCIAL HISTORY  She does not drink alcohol. She does not smoke, use electronic cigarettes, vaping, or use smokeless tobacco. She does not use drugs.    FAMILY HISTORY  She reports no family history of an abdominal aortic aneurysm.      Patient Care Team:  Venessa Kay PA-C as PCP - General (Family Medicine)  MD Fang Crawford MD    Review of Systems   Constitutional:  Negative for chills and fever.   Respiratory:  Negative for shortness of breath.    Cardiovascular:  Negative for chest pain, palpitations and leg swelling.   Musculoskeletal:  Positive for gait problem (uses  cane).   Neurological:  Negative for dizziness, syncope and headaches.     Medical History Reviewed by provider this encounter:       Annual Wellness Visit Questionnaire   Violeta is here for her Subsequent Wellness visit. Last Medicare Wellness visit information reviewed, patient interviewed and updates made to the record today.      Health Risk Assessment:   Patient rates overall health as good. Patient feels that their physical health rating is same. Patient is satisfied with their life. Eyesight was rated as same. Hearing was rated as same. Patient feels that their emotional and mental health rating is same. Patients states they are never, rarely angry. Patient states they are sometimes unusually tired/fatigued. Pain experienced in the last 7 days has been none. Patient states that she has experienced no weight loss or gain in last 6 months.     Depression Screening:   PHQ-2 Score: 0      Fall Risk Screening:   In the past year, patient has experienced: no history of falling in past year      Urinary Incontinence Screening:   Patient has not leaked urine accidently in the last six months.     Home Safety:  Patient does not have trouble with stairs inside or outside of their home. Patient has working smoke alarms and has working carbon monoxide detector. Home safety hazards include: none.     Nutrition:   Current diet is Regular.     Medications:   Patient is currently taking over-the-counter supplements. OTC medications include: see medication list. Patient is able to manage medications.     Activities of Daily Living (ADLs)/Instrumental Activities of Daily Living (IADLs):   Walk and transfer into and out of bed and chair?: Yes  Dress and groom yourself?: Yes    Bathe or shower yourself?: Yes    Feed yourself? Yes  Do your laundry/housekeeping?: Yes  Manage your money, pay your bills and track your expenses?: Yes  Make your own meals?: Yes    Do your own shopping?: No    Previous Hospitalizations:   Any  hospitalizations or ED visits within the last 12 months?: No      Advance Care Planning:   Living will: Yes    Durable POA for healthcare: No    Advanced directive: Yes      Cognitive Screening:   Provider or family/friend/caregiver concerned regarding cognition?: No    Preventive Screenings      Cardiovascular Screening:    General: Screening Not Indicated and History Lipid Disorder      Diabetes Screening:     General: Screening Not Indicated and History Diabetes      Colorectal Cancer Screening:     General: Screening Not Indicated      Breast Cancer Screening:     General: Screening Current      Cervical Cancer Screening:    General: Screening Not Indicated      Osteoporosis Screening:    General: Screening Not Indicated and History Osteoporosis      Abdominal Aortic Aneurysm (AAA) Screening:        General: Screening Not Indicated      Lung Cancer Screening:     General: Screening Not Indicated      Hepatitis C Screening:    General: Screening Current    Immunizations:  - Immunizations due: RSV, COVID  - The patient declines recommended vaccines currently despite my recommendations      Screening, Brief Intervention, and Referral to Treatment (SBIRT)     Screening  Typical number of drinks in a day: 0  Typical number of drinks in a week: 0  Interpretation: Low risk drinking behavior.    Single Item Drug Screening:  How often have you used an illegal drug (including marijuana) or a prescription medication for non-medical reasons in the past year? never    Single Item Drug Screen Score: 0  Interpretation: Negative screen for possible drug use disorder    Social Drivers of Health     Financial Resource Strain: Low Risk  (5/15/2023)    Overall Financial Resource Strain (CARDIA)     Difficulty of Paying Living Expenses: Not hard at all   Food Insecurity: No Food Insecurity (6/19/2024)    Nursing - Inadequate Food Risk Classification     Worried About Running Out of Food in the Last Year: Never true     Ran Out of  Food in the Last Year: Never true   Transportation Needs: No Transportation Needs (6/19/2024)    PRAPARE - Transportation     Lack of Transportation (Medical): No     Lack of Transportation (Non-Medical): No   Housing Stability: Low Risk  (6/19/2024)    Housing Stability Vital Sign     Unable to Pay for Housing in the Last Year: No     Number of Times Moved in the Last Year: 0     Homeless in the Last Year: No   Utilities: Not At Risk (6/19/2024)    [facility] Utilities     Threatened with loss of utilities: No     No results found.    Objective   Ht 5' (1.524 m)   BMI 29.29 kg/m²     Physical Exam  Mouth/Throat: Oropharynx clear, no lesions noted.  Respiratory: Clear to auscultation, no wheezing, rales or rhonchi  Gastrointestinal: Soft, no tenderness, no distention, no masses  Extremities: Mild swelling noted, more pronounced on the left side.      Physical Exam  Vitals reviewed.   Constitutional:       General: She is not in acute distress.     Appearance: Normal appearance. She is well-developed. She is obese. She is not ill-appearing.   HENT:      Head: Normocephalic and atraumatic.      Right Ear: Tympanic membrane, ear canal and external ear normal.      Left Ear: Tympanic membrane, ear canal and external ear normal.      Mouth/Throat:      Mouth: Mucous membranes are moist.      Pharynx: No oropharyngeal exudate or posterior oropharyngeal erythema.     Eyes:      Conjunctiva/sclera: Conjunctivae normal.      Pupils: Pupils are equal, round, and reactive to light.     Neck:      Thyroid: No thyromegaly.      Vascular: No carotid bruit.     Cardiovascular:      Rate and Rhythm: Normal rate and regular rhythm.      Pulses: Normal pulses.      Heart sounds: Normal heart sounds. No murmur heard.  Pulmonary:      Effort: Pulmonary effort is normal.      Breath sounds: Normal breath sounds. No wheezing, rhonchi or rales.   Abdominal:      General: Bowel sounds are normal. There is no distension.      Palpations: Abdomen  is soft. There is no mass.      Tenderness: There is no abdominal tenderness. There is no guarding.     Musculoskeletal:      Cervical back: Normal range of motion and neck supple.      Right lower leg: No edema.      Left lower leg: No edema.      Comments: Uses a cane   Lymphadenopathy:      Cervical: No cervical adenopathy.     Skin:     General: Skin is warm and dry.      Findings: No rash.     Neurological:      Mental Status: She is alert.      Sensory: No sensory deficit.      Motor: No weakness.      Comments: 5/5 strength in UE and LE   Psychiatric:         Mood and Affect: Mood normal.         Behavior: Behavior normal.         Thought Content: Thought content normal.         Judgment: Judgment normal.

## 2025-07-11 NOTE — ASSESSMENT & PLAN NOTE
Diet-controlled. Continue limiting carb intake. Recheck labs prior to next visit in 6 months.   Lab Results   Component Value Date    HGBA1C 6.1 07/11/2025       Orders:    POCT hemoglobin A1c    Comprehensive metabolic panel; Future    CBC and differential; Future    Lipid Panel with Direct LDL reflex; Future    TSH, 3rd generation with Free T4 reflex; Future    Hemoglobin A1C; Future    Albumin / creatinine urine ratio; Future

## 2025-07-11 NOTE — ASSESSMENT & PLAN NOTE
BMI Counseling: Body mass index is 30.47 kg/m². The BMI is above normal. Nutrition recommendations include moderation in carbohydrate intake. Exercise recommendations include exercising 3-5 times per week.